# Patient Record
Sex: FEMALE | Race: BLACK OR AFRICAN AMERICAN | NOT HISPANIC OR LATINO | Employment: OTHER | URBAN - METROPOLITAN AREA
[De-identification: names, ages, dates, MRNs, and addresses within clinical notes are randomized per-mention and may not be internally consistent; named-entity substitution may affect disease eponyms.]

---

## 2017-02-02 ENCOUNTER — ALLSCRIPTS OFFICE VISIT (OUTPATIENT)
Dept: OTHER | Facility: OTHER | Age: 82
End: 2017-02-02

## 2017-02-02 ENCOUNTER — GENERIC CONVERSION - ENCOUNTER (OUTPATIENT)
Dept: OTHER | Facility: OTHER | Age: 82
End: 2017-02-02

## 2017-02-02 ENCOUNTER — TRANSCRIBE ORDERS (OUTPATIENT)
Dept: LAB | Facility: CLINIC | Age: 82
End: 2017-02-02

## 2017-02-02 ENCOUNTER — APPOINTMENT (OUTPATIENT)
Dept: LAB | Facility: CLINIC | Age: 82
End: 2017-02-02
Payer: COMMERCIAL

## 2017-02-02 DIAGNOSIS — R44.0 AUDITORY HALLUCINATIONS: ICD-10-CM

## 2017-02-02 DIAGNOSIS — R44.0 AUDITORY HALLUCINATION: Primary | ICD-10-CM

## 2017-02-02 DIAGNOSIS — Z63.4 BEREAVEMENT: ICD-10-CM

## 2017-02-02 DIAGNOSIS — R44.1 VISUAL HALLUCINATIONS: ICD-10-CM

## 2017-02-02 DIAGNOSIS — R44.1 VISUAL HALLUCINATION: ICD-10-CM

## 2017-02-02 DIAGNOSIS — Z63.4 DISAPPEARANCE AND DEATH OF FAMILY MEMBER: ICD-10-CM

## 2017-02-02 DIAGNOSIS — R44.0 AUDITORY HALLUCINATION: ICD-10-CM

## 2017-02-02 LAB
ALBUMIN SERPL BCP-MCNC: 3.8 G/DL (ref 3.5–5)
ALP SERPL-CCNC: 91 U/L (ref 46–116)
ALT SERPL W P-5'-P-CCNC: 19 U/L (ref 12–78)
ANION GAP SERPL CALCULATED.3IONS-SCNC: 7 MMOL/L (ref 4–13)
AST SERPL W P-5'-P-CCNC: 20 U/L (ref 5–45)
BASOPHILS # BLD AUTO: 0.02 THOUSANDS/ΜL (ref 0–0.1)
BASOPHILS NFR BLD AUTO: 0 % (ref 0–1)
BILIRUB SERPL-MCNC: 0.4 MG/DL (ref 0.2–1)
BILIRUB UR QL STRIP: NEGATIVE
BUN SERPL-MCNC: 13 MG/DL (ref 5–25)
CALCIUM SERPL-MCNC: 10.1 MG/DL (ref 8.3–10.1)
CHLORIDE SERPL-SCNC: 107 MMOL/L (ref 100–108)
CLARITY UR: CLEAR
CO2 SERPL-SCNC: 30 MMOL/L (ref 21–32)
COLOR UR: YELLOW
CREAT SERPL-MCNC: 0.84 MG/DL (ref 0.6–1.3)
EOSINOPHIL # BLD AUTO: 0 THOUSAND/ΜL (ref 0–0.61)
EOSINOPHIL NFR BLD AUTO: 0 % (ref 0–6)
ERYTHROCYTE [DISTWIDTH] IN BLOOD BY AUTOMATED COUNT: 13.9 % (ref 11.6–15.1)
FOLATE SERPL-MCNC: 10.7 NG/ML (ref 3.1–17.5)
GFR SERPL CREATININE-BSD FRML MDRD: >60 ML/MIN/1.73SQ M
GLUCOSE SERPL-MCNC: 88 MG/DL (ref 65–140)
GLUCOSE UR STRIP-MCNC: NEGATIVE MG/DL
HCT VFR BLD AUTO: 42.2 % (ref 34.8–46.1)
HGB BLD-MCNC: 13.5 G/DL (ref 11.5–15.4)
HGB UR QL STRIP.AUTO: NEGATIVE
KETONES UR STRIP-MCNC: NEGATIVE MG/DL
LEUKOCYTE ESTERASE UR QL STRIP: NEGATIVE
LYMPHOCYTES # BLD AUTO: 1.58 THOUSANDS/ΜL (ref 0.6–4.47)
LYMPHOCYTES NFR BLD AUTO: 34 % (ref 14–44)
MCH RBC QN AUTO: 27.7 PG (ref 26.8–34.3)
MCHC RBC AUTO-ENTMCNC: 32 G/DL (ref 31.4–37.4)
MCV RBC AUTO: 87 FL (ref 82–98)
MONOCYTES # BLD AUTO: 0.76 THOUSAND/ΜL (ref 0.17–1.22)
MONOCYTES NFR BLD AUTO: 16 % (ref 4–12)
NEUTROPHILS # BLD AUTO: 2.3 THOUSANDS/ΜL (ref 1.85–7.62)
NEUTS SEG NFR BLD AUTO: 50 % (ref 43–75)
NITRITE UR QL STRIP: NEGATIVE
PH UR STRIP.AUTO: 6 [PH] (ref 4.5–8)
PLATELET # BLD AUTO: 252 THOUSANDS/UL (ref 149–390)
PMV BLD AUTO: 11 FL (ref 8.9–12.7)
POTASSIUM SERPL-SCNC: 3.5 MMOL/L (ref 3.5–5.3)
PROT SERPL-MCNC: 7.7 G/DL (ref 6.4–8.2)
PROT UR STRIP-MCNC: NEGATIVE MG/DL
PTH-INTACT SERPL-MCNC: 85.6 PG/ML (ref 14–72)
RBC # BLD AUTO: 4.87 MILLION/UL (ref 3.81–5.12)
SODIUM SERPL-SCNC: 144 MMOL/L (ref 136–145)
SP GR UR STRIP.AUTO: >=1.03 (ref 1–1.03)
TSH SERPL DL<=0.05 MIU/L-ACNC: 1.72 UIU/ML (ref 0.36–3.74)
UROBILINOGEN UR QL STRIP.AUTO: 1 E.U./DL
VIT B12 SERPL-MCNC: 295 PG/ML (ref 100–900)
WBC # BLD AUTO: 4.66 THOUSAND/UL (ref 4.31–10.16)

## 2017-02-02 PROCEDURE — 83970 ASSAY OF PARATHORMONE: CPT

## 2017-02-02 PROCEDURE — 85025 COMPLETE CBC W/AUTO DIFF WBC: CPT

## 2017-02-02 PROCEDURE — 82607 VITAMIN B-12: CPT

## 2017-02-02 PROCEDURE — 81003 URINALYSIS AUTO W/O SCOPE: CPT

## 2017-02-02 PROCEDURE — 82746 ASSAY OF FOLIC ACID SERUM: CPT

## 2017-02-02 PROCEDURE — 84443 ASSAY THYROID STIM HORMONE: CPT

## 2017-02-02 PROCEDURE — 86592 SYPHILIS TEST NON-TREP QUAL: CPT

## 2017-02-02 PROCEDURE — 36415 COLL VENOUS BLD VENIPUNCTURE: CPT

## 2017-02-02 PROCEDURE — 80053 COMPREHEN METABOLIC PANEL: CPT

## 2017-02-02 SDOH — SOCIAL STABILITY - SOCIAL INSECURITY: DISSAPEARANCE AND DEATH OF FAMILY MEMBER: Z63.4

## 2017-02-03 ENCOUNTER — GENERIC CONVERSION - ENCOUNTER (OUTPATIENT)
Dept: OTHER | Facility: OTHER | Age: 82
End: 2017-02-03

## 2017-02-03 LAB — RPR SER QL: NORMAL

## 2017-02-07 ENCOUNTER — ALLSCRIPTS OFFICE VISIT (OUTPATIENT)
Dept: OTHER | Facility: OTHER | Age: 82
End: 2017-02-07

## 2017-02-15 ENCOUNTER — ALLSCRIPTS OFFICE VISIT (OUTPATIENT)
Dept: OTHER | Facility: OTHER | Age: 82
End: 2017-02-15

## 2017-02-16 ENCOUNTER — TRANSCRIBE ORDERS (OUTPATIENT)
Dept: ADMINISTRATIVE | Facility: HOSPITAL | Age: 82
End: 2017-02-16

## 2017-02-16 DIAGNOSIS — R44.1 VISUAL HALLUCINATION: Primary | ICD-10-CM

## 2017-02-22 ENCOUNTER — ALLSCRIPTS OFFICE VISIT (OUTPATIENT)
Dept: OTHER | Facility: OTHER | Age: 82
End: 2017-02-22

## 2017-03-02 ENCOUNTER — APPOINTMENT (EMERGENCY)
Dept: RADIOLOGY | Facility: HOSPITAL | Age: 82
DRG: 885 | End: 2017-03-02
Payer: COMMERCIAL

## 2017-03-02 ENCOUNTER — HOSPITAL ENCOUNTER (INPATIENT)
Facility: HOSPITAL | Age: 82
LOS: 8 days | Discharge: HOME | DRG: 885 | End: 2017-03-10
Attending: EMERGENCY MEDICINE | Admitting: PSYCHIATRY & NEUROLOGY
Payer: COMMERCIAL

## 2017-03-02 DIAGNOSIS — F22 ACUTE PARANOIA (HCC): Primary | ICD-10-CM

## 2017-03-02 DIAGNOSIS — R60.0 BILATERAL LOWER EXTREMITY EDEMA: ICD-10-CM

## 2017-03-02 DIAGNOSIS — F29 PSYCHOSIS (HCC): ICD-10-CM

## 2017-03-02 LAB
ALBUMIN SERPL BCP-MCNC: 3.2 G/DL (ref 3.5–5)
ALP SERPL-CCNC: 82 U/L (ref 46–116)
ALT SERPL W P-5'-P-CCNC: 15 U/L (ref 12–78)
ANION GAP SERPL CALCULATED.3IONS-SCNC: 6 MMOL/L (ref 4–13)
AST SERPL W P-5'-P-CCNC: 20 U/L (ref 5–45)
BASOPHILS # BLD AUTO: 0.02 THOUSANDS/ΜL (ref 0–0.1)
BASOPHILS NFR BLD AUTO: 1 % (ref 0–1)
BILIRUB SERPL-MCNC: 0.48 MG/DL (ref 0.2–1)
BUN SERPL-MCNC: 10 MG/DL (ref 5–25)
CALCIUM SERPL-MCNC: 10.2 MG/DL (ref 8.3–10.1)
CHLORIDE SERPL-SCNC: 109 MMOL/L (ref 100–108)
CO2 SERPL-SCNC: 28 MMOL/L (ref 21–32)
CREAT SERPL-MCNC: 0.81 MG/DL (ref 0.6–1.3)
EOSINOPHIL # BLD AUTO: 0 THOUSAND/ΜL (ref 0–0.61)
EOSINOPHIL NFR BLD AUTO: 0 % (ref 0–6)
ERYTHROCYTE [DISTWIDTH] IN BLOOD BY AUTOMATED COUNT: 13.7 % (ref 11.6–15.1)
GFR SERPL CREATININE-BSD FRML MDRD: >60 ML/MIN/1.73SQ M
GLUCOSE SERPL-MCNC: 81 MG/DL (ref 65–140)
HCT VFR BLD AUTO: 39.1 % (ref 34.8–46.1)
HGB BLD-MCNC: 12.7 G/DL (ref 11.5–15.4)
LYMPHOCYTES # BLD AUTO: 0.89 THOUSANDS/ΜL (ref 0.6–4.47)
LYMPHOCYTES NFR BLD AUTO: 22 % (ref 14–44)
MCH RBC QN AUTO: 27.9 PG (ref 26.8–34.3)
MCHC RBC AUTO-ENTMCNC: 32.5 G/DL (ref 31.4–37.4)
MCV RBC AUTO: 86 FL (ref 82–98)
MONOCYTES # BLD AUTO: 0.68 THOUSAND/ΜL (ref 0.17–1.22)
MONOCYTES NFR BLD AUTO: 17 % (ref 4–12)
NEUTROPHILS # BLD AUTO: 2.52 THOUSANDS/ΜL (ref 1.85–7.62)
NEUTS SEG NFR BLD AUTO: 60 % (ref 43–75)
NRBC BLD AUTO-RTO: 0 /100 WBCS
PLATELET # BLD AUTO: 214 THOUSANDS/UL (ref 149–390)
PMV BLD AUTO: 10.5 FL (ref 8.9–12.7)
POTASSIUM SERPL-SCNC: 3.8 MMOL/L (ref 3.5–5.3)
PROT SERPL-MCNC: 7.1 G/DL (ref 6.4–8.2)
RBC # BLD AUTO: 4.56 MILLION/UL (ref 3.81–5.12)
SODIUM SERPL-SCNC: 143 MMOL/L (ref 136–145)
TSH SERPL DL<=0.05 MIU/L-ACNC: 1.61 UIU/ML (ref 0.36–3.74)
WBC # BLD AUTO: 4.11 THOUSAND/UL (ref 4.31–10.16)

## 2017-03-02 PROCEDURE — 80053 COMPREHEN METABOLIC PANEL: CPT | Performed by: EMERGENCY MEDICINE

## 2017-03-02 PROCEDURE — 84443 ASSAY THYROID STIM HORMONE: CPT | Performed by: EMERGENCY MEDICINE

## 2017-03-02 PROCEDURE — 36415 COLL VENOUS BLD VENIPUNCTURE: CPT | Performed by: EMERGENCY MEDICINE

## 2017-03-02 PROCEDURE — 70450 CT HEAD/BRAIN W/O DYE: CPT

## 2017-03-02 PROCEDURE — 85025 COMPLETE CBC W/AUTO DIFF WBC: CPT | Performed by: EMERGENCY MEDICINE

## 2017-03-02 PROCEDURE — 99285 EMERGENCY DEPT VISIT HI MDM: CPT

## 2017-03-02 RX ORDER — RISPERIDONE 1 MG/1
1 TABLET, ORALLY DISINTEGRATING ORAL EVERY 8 HOURS PRN
Status: DISCONTINUED | OUTPATIENT
Start: 2017-03-02 | End: 2017-03-10 | Stop reason: HOSPADM

## 2017-03-02 RX ORDER — FUROSEMIDE 20 MG/1
20 TABLET ORAL DAILY
Status: DISCONTINUED | OUTPATIENT
Start: 2017-03-03 | End: 2017-03-10 | Stop reason: HOSPADM

## 2017-03-02 RX ORDER — LOSARTAN POTASSIUM 50 MG/1
100 TABLET ORAL DAILY
Status: DISCONTINUED | OUTPATIENT
Start: 2017-03-03 | End: 2017-03-07

## 2017-03-02 RX ORDER — OLANZAPINE 5 MG/1
2.5 TABLET ORAL EVERY 8 HOURS PRN
Status: DISCONTINUED | OUTPATIENT
Start: 2017-03-02 | End: 2017-03-10 | Stop reason: HOSPADM

## 2017-03-02 RX ORDER — LORAZEPAM 2 MG/ML
0.5 INJECTION INTRAMUSCULAR EVERY 6 HOURS PRN
Status: DISCONTINUED | OUTPATIENT
Start: 2017-03-02 | End: 2017-03-10 | Stop reason: HOSPADM

## 2017-03-02 RX ORDER — FUROSEMIDE 20 MG/1
20 TABLET ORAL DAILY
COMMUNITY
End: 2017-12-05 | Stop reason: HOSPADM

## 2017-03-02 RX ORDER — RISPERIDONE 1 MG/1
1 TABLET, FILM COATED ORAL 2 TIMES DAILY
COMMUNITY
End: 2017-03-10 | Stop reason: HOSPADM

## 2017-03-02 RX ORDER — LORAZEPAM 0.5 MG/1
0.5 TABLET ORAL EVERY 6 HOURS PRN
Status: DISCONTINUED | OUTPATIENT
Start: 2017-03-02 | End: 2017-03-10 | Stop reason: HOSPADM

## 2017-03-02 RX ORDER — ATORVASTATIN CALCIUM 20 MG/1
20 TABLET, FILM COATED ORAL DAILY
Status: DISCONTINUED | OUTPATIENT
Start: 2017-03-03 | End: 2017-03-10 | Stop reason: HOSPADM

## 2017-03-02 RX ORDER — RISPERIDONE 1 MG/1
1 TABLET, FILM COATED ORAL 2 TIMES DAILY
Status: DISCONTINUED | OUTPATIENT
Start: 2017-03-02 | End: 2017-03-04

## 2017-03-02 RX ORDER — LOSARTAN POTASSIUM 100 MG/1
100 TABLET ORAL DAILY
COMMUNITY
End: 2018-04-11 | Stop reason: HOSPADM

## 2017-03-02 RX ORDER — OLANZAPINE 10 MG/1
2.5 INJECTION, POWDER, LYOPHILIZED, FOR SOLUTION INTRAMUSCULAR EVERY 8 HOURS PRN
Status: DISCONTINUED | OUTPATIENT
Start: 2017-03-02 | End: 2017-03-10 | Stop reason: HOSPADM

## 2017-03-02 RX ORDER — ATORVASTATIN CALCIUM 20 MG/1
20 TABLET, FILM COATED ORAL DAILY
COMMUNITY
End: 2018-03-22 | Stop reason: SDUPTHER

## 2017-03-02 RX ORDER — AMLODIPINE BESYLATE 5 MG/1
7.5 TABLET ORAL DAILY
COMMUNITY
End: 2017-11-24 | Stop reason: CLARIF

## 2017-03-02 RX ORDER — TRAZODONE HYDROCHLORIDE 50 MG/1
25 TABLET ORAL
Status: DISCONTINUED | OUTPATIENT
Start: 2017-03-02 | End: 2017-03-10 | Stop reason: HOSPADM

## 2017-03-02 RX ADMIN — RISPERIDONE 1 MG: 1 TABLET ORAL at 21:35

## 2017-03-03 PROBLEM — F29 PSYCHOSIS (HCC): Status: ACTIVE | Noted: 2017-03-03

## 2017-03-03 LAB
ALBUMIN SERPL BCP-MCNC: 2.9 G/DL (ref 3.5–5)
ALP SERPL-CCNC: 75 U/L (ref 46–116)
ALT SERPL W P-5'-P-CCNC: 16 U/L (ref 12–78)
ANION GAP SERPL CALCULATED.3IONS-SCNC: 7 MMOL/L (ref 4–13)
AST SERPL W P-5'-P-CCNC: 17 U/L (ref 5–45)
BILIRUB SERPL-MCNC: 0.42 MG/DL (ref 0.2–1)
BILIRUB UR QL STRIP: NEGATIVE
BUN SERPL-MCNC: 12 MG/DL (ref 5–25)
CALCIUM SERPL-MCNC: 9.7 MG/DL (ref 8.3–10.1)
CHLORIDE SERPL-SCNC: 111 MMOL/L (ref 100–108)
CLARITY UR: CLEAR
CO2 SERPL-SCNC: 27 MMOL/L (ref 21–32)
COLOR UR: YELLOW
CREAT SERPL-MCNC: 0.8 MG/DL (ref 0.6–1.3)
GFR SERPL CREATININE-BSD FRML MDRD: >60 ML/MIN/1.73SQ M
GLUCOSE SERPL-MCNC: 80 MG/DL (ref 65–140)
GLUCOSE UR STRIP-MCNC: NEGATIVE MG/DL
HGB UR QL STRIP.AUTO: NEGATIVE
KETONES UR STRIP-MCNC: NEGATIVE MG/DL
LEUKOCYTE ESTERASE UR QL STRIP: NEGATIVE
NITRITE UR QL STRIP: NEGATIVE
PH UR STRIP.AUTO: 5.5 [PH] (ref 4.5–8)
POTASSIUM SERPL-SCNC: 3.4 MMOL/L (ref 3.5–5.3)
PROT SERPL-MCNC: 6.4 G/DL (ref 6.4–8.2)
PROT UR STRIP-MCNC: NEGATIVE MG/DL
RPR SER QL: NORMAL
SODIUM SERPL-SCNC: 145 MMOL/L (ref 136–145)
SP GR UR STRIP.AUTO: 1.01 (ref 1–1.03)
UROBILINOGEN UR QL STRIP.AUTO: 0.2 E.U./DL

## 2017-03-03 PROCEDURE — 86592 SYPHILIS TEST NON-TREP QUAL: CPT | Performed by: PHYSICIAN ASSISTANT

## 2017-03-03 PROCEDURE — 81003 URINALYSIS AUTO W/O SCOPE: CPT | Performed by: PHYSICIAN ASSISTANT

## 2017-03-03 PROCEDURE — 80053 COMPREHEN METABOLIC PANEL: CPT | Performed by: PHYSICIAN ASSISTANT

## 2017-03-03 RX ADMIN — LOSARTAN POTASSIUM 100 MG: 50 TABLET, FILM COATED ORAL at 09:10

## 2017-03-03 RX ADMIN — FUROSEMIDE 20 MG: 20 TABLET ORAL at 09:10

## 2017-03-03 RX ADMIN — RISPERIDONE 1 MG: 1 TABLET ORAL at 17:20

## 2017-03-03 RX ADMIN — ATORVASTATIN CALCIUM 20 MG: 20 TABLET, FILM COATED ORAL at 09:10

## 2017-03-03 RX ADMIN — RISPERIDONE 1 MG: 1 TABLET ORAL at 09:11

## 2017-03-03 RX ADMIN — AMLODIPINE BESYLATE 7.5 MG: 5 TABLET ORAL at 09:10

## 2017-03-04 LAB — TSH SERPL DL<=0.05 MIU/L-ACNC: 2.32 UIU/ML (ref 0.36–3.74)

## 2017-03-04 PROCEDURE — 84443 ASSAY THYROID STIM HORMONE: CPT | Performed by: PSYCHIATRY & NEUROLOGY

## 2017-03-04 RX ORDER — RISPERIDONE 0.25 MG/1
0.5 TABLET, FILM COATED ORAL 2 TIMES DAILY
Status: DISCONTINUED | OUTPATIENT
Start: 2017-03-04 | End: 2017-03-08

## 2017-03-04 RX ADMIN — ATORVASTATIN CALCIUM 20 MG: 20 TABLET, FILM COATED ORAL at 08:20

## 2017-03-04 RX ADMIN — LOSARTAN POTASSIUM 100 MG: 50 TABLET, FILM COATED ORAL at 08:19

## 2017-03-04 RX ADMIN — RISPERIDONE 0.5 MG: 0.25 TABLET ORAL at 21:16

## 2017-03-04 RX ADMIN — RISPERIDONE 1 MG: 1 TABLET ORAL at 08:19

## 2017-03-04 RX ADMIN — FUROSEMIDE 20 MG: 20 TABLET ORAL at 08:20

## 2017-03-04 RX ADMIN — AMLODIPINE BESYLATE 7.5 MG: 5 TABLET ORAL at 08:19

## 2017-03-05 RX ORDER — POTASSIUM CHLORIDE 20 MEQ/1
20 TABLET, EXTENDED RELEASE ORAL ONCE
Status: COMPLETED | OUTPATIENT
Start: 2017-03-06 | End: 2017-03-06

## 2017-03-05 RX ADMIN — RISPERIDONE 0.5 MG: 0.25 TABLET ORAL at 08:55

## 2017-03-05 RX ADMIN — AMLODIPINE BESYLATE 7.5 MG: 5 TABLET ORAL at 08:54

## 2017-03-05 RX ADMIN — ATORVASTATIN CALCIUM 20 MG: 20 TABLET, FILM COATED ORAL at 08:55

## 2017-03-05 RX ADMIN — FUROSEMIDE 20 MG: 20 TABLET ORAL at 08:55

## 2017-03-05 RX ADMIN — RISPERIDONE 0.5 MG: 0.25 TABLET ORAL at 21:08

## 2017-03-06 RX ADMIN — POTASSIUM CHLORIDE 20 MEQ: 1500 TABLET, EXTENDED RELEASE ORAL at 00:00

## 2017-03-06 RX ADMIN — ATORVASTATIN CALCIUM 20 MG: 20 TABLET, FILM COATED ORAL at 08:43

## 2017-03-06 RX ADMIN — RISPERIDONE 0.5 MG: 0.25 TABLET ORAL at 08:43

## 2017-03-06 RX ADMIN — FUROSEMIDE 20 MG: 20 TABLET ORAL at 08:43

## 2017-03-06 RX ADMIN — RISPERIDONE 0.5 MG: 0.25 TABLET ORAL at 21:22

## 2017-03-06 RX ADMIN — LOSARTAN POTASSIUM 100 MG: 50 TABLET, FILM COATED ORAL at 08:43

## 2017-03-07 RX ORDER — LOSARTAN POTASSIUM 50 MG/1
50 TABLET ORAL DAILY
Status: DISCONTINUED | OUTPATIENT
Start: 2017-03-08 | End: 2017-03-10 | Stop reason: HOSPADM

## 2017-03-07 RX ADMIN — FUROSEMIDE 20 MG: 20 TABLET ORAL at 08:31

## 2017-03-07 RX ADMIN — RISPERIDONE 0.5 MG: 0.25 TABLET ORAL at 21:33

## 2017-03-07 RX ADMIN — RISPERIDONE 0.5 MG: 0.25 TABLET ORAL at 08:30

## 2017-03-07 RX ADMIN — LOSARTAN POTASSIUM 100 MG: 50 TABLET, FILM COATED ORAL at 08:30

## 2017-03-07 RX ADMIN — ATORVASTATIN CALCIUM 20 MG: 20 TABLET, FILM COATED ORAL at 08:31

## 2017-03-08 RX ORDER — ACETAMINOPHEN 325 MG/1
650 TABLET ORAL EVERY 6 HOURS PRN
Status: DISCONTINUED | OUTPATIENT
Start: 2017-03-08 | End: 2017-03-10 | Stop reason: HOSPADM

## 2017-03-08 RX ORDER — RISPERIDONE 0.25 MG/1
0.5 TABLET, FILM COATED ORAL EVERY MORNING
Status: DISCONTINUED | OUTPATIENT
Start: 2017-03-09 | End: 2017-03-10 | Stop reason: HOSPADM

## 2017-03-08 RX ORDER — ACETAMINOPHEN 325 MG/1
650 TABLET ORAL EVERY 4 HOURS PRN
Status: DISCONTINUED | OUTPATIENT
Start: 2017-03-08 | End: 2017-03-10 | Stop reason: HOSPADM

## 2017-03-08 RX ORDER — ACETAMINOPHEN 325 MG/1
975 TABLET ORAL EVERY 6 HOURS PRN
Status: DISCONTINUED | OUTPATIENT
Start: 2017-03-08 | End: 2017-03-10 | Stop reason: HOSPADM

## 2017-03-08 RX ORDER — RISPERIDONE 1 MG/1
1 TABLET, FILM COATED ORAL
Status: DISCONTINUED | OUTPATIENT
Start: 2017-03-08 | End: 2017-03-10 | Stop reason: HOSPADM

## 2017-03-08 RX ADMIN — FUROSEMIDE 20 MG: 20 TABLET ORAL at 08:44

## 2017-03-08 RX ADMIN — RISPERIDONE 1 MG: 1 TABLET, FILM COATED ORAL at 21:22

## 2017-03-08 RX ADMIN — RISPERIDONE 0.5 MG: 0.25 TABLET ORAL at 08:44

## 2017-03-08 RX ADMIN — LOSARTAN POTASSIUM 50 MG: 50 TABLET, FILM COATED ORAL at 08:45

## 2017-03-08 RX ADMIN — ATORVASTATIN CALCIUM 20 MG: 20 TABLET, FILM COATED ORAL at 08:44

## 2017-03-08 RX ADMIN — ACETAMINOPHEN 975 MG: 325 TABLET, FILM COATED ORAL at 12:20

## 2017-03-09 PROCEDURE — 97166 OT EVAL MOD COMPLEX 45 MIN: CPT

## 2017-03-09 RX ORDER — RISPERIDONE 1 MG/1
TABLET, FILM COATED ORAL
Status: DISPENSED
Start: 2017-03-09 | End: 2017-03-10

## 2017-03-09 RX ADMIN — LOSARTAN POTASSIUM 50 MG: 50 TABLET, FILM COATED ORAL at 08:56

## 2017-03-09 RX ADMIN — RISPERIDONE 1 MG: 1 TABLET, FILM COATED ORAL at 21:50

## 2017-03-09 RX ADMIN — ATORVASTATIN CALCIUM 20 MG: 20 TABLET, FILM COATED ORAL at 08:56

## 2017-03-09 RX ADMIN — ACETAMINOPHEN 975 MG: 325 TABLET, FILM COATED ORAL at 03:31

## 2017-03-09 RX ADMIN — RISPERIDONE 0.5 MG: 0.25 TABLET ORAL at 08:58

## 2017-03-09 RX ADMIN — FUROSEMIDE 20 MG: 20 TABLET ORAL at 08:56

## 2017-03-10 VITALS
RESPIRATION RATE: 18 BRPM | DIASTOLIC BLOOD PRESSURE: 64 MMHG | WEIGHT: 152.8 LBS | OXYGEN SATURATION: 95 % | BODY MASS INDEX: 24.56 KG/M2 | SYSTOLIC BLOOD PRESSURE: 138 MMHG | HEIGHT: 66 IN | TEMPERATURE: 99.1 F | HEART RATE: 81 BPM

## 2017-03-10 RX ORDER — LOSARTAN POTASSIUM 50 MG/1
TABLET ORAL
Status: COMPLETED
Start: 2017-03-10 | End: 2017-03-10

## 2017-03-10 RX ORDER — RISPERIDONE 0.5 MG/1
0.5 TABLET, FILM COATED ORAL EVERY MORNING
Qty: 30 TABLET | Refills: 0 | Status: SHIPPED | OUTPATIENT
Start: 2017-03-10 | End: 2017-12-05 | Stop reason: HOSPADM

## 2017-03-10 RX ORDER — ATORVASTATIN CALCIUM 20 MG/1
TABLET, FILM COATED ORAL
Status: COMPLETED
Start: 2017-03-10 | End: 2017-03-10

## 2017-03-10 RX ORDER — RISPERIDONE 0.25 MG/1
TABLET, FILM COATED ORAL
Status: COMPLETED
Start: 2017-03-10 | End: 2017-03-10

## 2017-03-10 RX ORDER — RISPERIDONE 1 MG/1
1 TABLET, FILM COATED ORAL
Qty: 30 TABLET | Refills: 0 | Status: SHIPPED | OUTPATIENT
Start: 2017-03-10 | End: 2017-11-24 | Stop reason: CLARIF

## 2017-03-10 RX ORDER — FUROSEMIDE 20 MG/1
TABLET ORAL
Status: COMPLETED
Start: 2017-03-10 | End: 2017-03-10

## 2017-03-10 RX ADMIN — LOSARTAN POTASSIUM 50 MG: 50 TABLET, FILM COATED ORAL at 08:44

## 2017-03-10 RX ADMIN — ATORVASTATIN CALCIUM 20 MG: 20 TABLET, FILM COATED ORAL at 08:44

## 2017-03-10 RX ADMIN — RISPERIDONE 0.5 MG: 0.25 TABLET ORAL at 08:44

## 2017-03-10 RX ADMIN — FUROSEMIDE 20 MG: 20 TABLET ORAL at 08:44

## 2017-03-20 ENCOUNTER — ALLSCRIPTS OFFICE VISIT (OUTPATIENT)
Dept: OTHER | Facility: OTHER | Age: 82
End: 2017-03-20

## 2017-03-21 ENCOUNTER — ALLSCRIPTS OFFICE VISIT (OUTPATIENT)
Dept: OTHER | Facility: OTHER | Age: 82
End: 2017-03-21

## 2017-06-13 ENCOUNTER — ALLSCRIPTS OFFICE VISIT (OUTPATIENT)
Dept: OTHER | Facility: OTHER | Age: 82
End: 2017-06-13

## 2017-06-28 ENCOUNTER — ALLSCRIPTS OFFICE VISIT (OUTPATIENT)
Dept: OTHER | Facility: OTHER | Age: 82
End: 2017-06-28

## 2017-09-11 ENCOUNTER — HOSPITAL ENCOUNTER (OUTPATIENT)
Dept: NON INVASIVE DIAGNOSTICS | Facility: CLINIC | Age: 82
Discharge: HOME | End: 2017-09-11
Payer: COMMERCIAL

## 2017-09-11 ENCOUNTER — ALLSCRIPTS OFFICE VISIT (OUTPATIENT)
Dept: OTHER | Facility: OTHER | Age: 82
End: 2017-09-11

## 2017-09-11 ENCOUNTER — TRANSCRIBE ORDERS (OUTPATIENT)
Dept: LAB | Facility: CLINIC | Age: 82
End: 2017-09-11

## 2017-09-11 ENCOUNTER — APPOINTMENT (OUTPATIENT)
Dept: LAB | Facility: CLINIC | Age: 82
End: 2017-09-11
Payer: COMMERCIAL

## 2017-09-11 DIAGNOSIS — I10 ESSENTIAL (PRIMARY) HYPERTENSION: ICD-10-CM

## 2017-09-11 DIAGNOSIS — R60.9 EDEMA: ICD-10-CM

## 2017-09-11 DIAGNOSIS — E53.8 DEFICIENCY OF OTHER SPECIFIED B GROUP VITAMINS: ICD-10-CM

## 2017-09-11 LAB
ALBUMIN SERPL BCP-MCNC: 3.8 G/DL (ref 3.5–5)
ALP SERPL-CCNC: 65 U/L (ref 46–116)
ALT SERPL W P-5'-P-CCNC: 14 U/L (ref 12–78)
ANION GAP SERPL CALCULATED.3IONS-SCNC: 9 MMOL/L (ref 4–13)
AST SERPL W P-5'-P-CCNC: 15 U/L (ref 5–45)
BASOPHILS # BLD AUTO: 0.02 THOUSANDS/ΜL (ref 0–0.1)
BASOPHILS NFR BLD AUTO: 1 % (ref 0–1)
BILIRUB SERPL-MCNC: 0.6 MG/DL (ref 0.2–1)
BUN SERPL-MCNC: 11 MG/DL (ref 5–25)
CALCIUM ALBUM COR SERPL-MCNC: 11.3 MG/DL (ref 8.3–10.1)
CALCIUM SERPL-MCNC: 11.1 MG/DL (ref 8.3–10.1)
CHLORIDE SERPL-SCNC: 105 MMOL/L (ref 100–108)
CO2 SERPL-SCNC: 29 MMOL/L (ref 21–32)
CREAT SERPL-MCNC: 0.95 MG/DL (ref 0.6–1.3)
EOSINOPHIL # BLD AUTO: 0 THOUSAND/ΜL (ref 0–0.61)
EOSINOPHIL NFR BLD AUTO: 0 % (ref 0–6)
ERYTHROCYTE [DISTWIDTH] IN BLOOD BY AUTOMATED COUNT: 13.5 % (ref 11.6–15.1)
GFR SERPL CREATININE-BSD FRML MDRD: 65 ML/MIN/1.73SQ M
GLUCOSE P FAST SERPL-MCNC: 86 MG/DL (ref 65–99)
HCT VFR BLD AUTO: 37.6 % (ref 34.8–46.1)
HGB BLD-MCNC: 12.2 G/DL (ref 11.5–15.4)
LYMPHOCYTES # BLD AUTO: 0.86 THOUSANDS/ΜL (ref 0.6–4.47)
LYMPHOCYTES NFR BLD AUTO: 25 % (ref 14–44)
MCH RBC QN AUTO: 27.5 PG (ref 26.8–34.3)
MCHC RBC AUTO-ENTMCNC: 32.4 G/DL (ref 31.4–37.4)
MCV RBC AUTO: 85 FL (ref 82–98)
MONOCYTES # BLD AUTO: 0.65 THOUSAND/ΜL (ref 0.17–1.22)
MONOCYTES NFR BLD AUTO: 19 % (ref 4–12)
NEUTROPHILS # BLD AUTO: 1.9 THOUSANDS/ΜL (ref 1.85–7.62)
NEUTS SEG NFR BLD AUTO: 55 % (ref 43–75)
PLATELET # BLD AUTO: 204 THOUSANDS/UL (ref 149–390)
PMV BLD AUTO: 10.2 FL (ref 8.9–12.7)
POTASSIUM SERPL-SCNC: 3.3 MMOL/L (ref 3.5–5.3)
PROT SERPL-MCNC: 7.9 G/DL (ref 6.4–8.2)
RBC # BLD AUTO: 4.44 MILLION/UL (ref 3.81–5.12)
SODIUM SERPL-SCNC: 143 MMOL/L (ref 136–145)
VIT B12 SERPL-MCNC: 1001 PG/ML (ref 100–900)
WBC # BLD AUTO: 3.43 THOUSAND/UL (ref 4.31–10.16)

## 2017-09-11 PROCEDURE — 82607 VITAMIN B-12: CPT

## 2017-09-11 PROCEDURE — 80053 COMPREHEN METABOLIC PANEL: CPT

## 2017-09-11 PROCEDURE — 93970 EXTREMITY STUDY: CPT

## 2017-09-11 PROCEDURE — 36415 COLL VENOUS BLD VENIPUNCTURE: CPT

## 2017-09-11 PROCEDURE — 85025 COMPLETE CBC W/AUTO DIFF WBC: CPT

## 2017-10-02 DIAGNOSIS — E55.9 VITAMIN D DEFICIENCY: ICD-10-CM

## 2017-10-02 DIAGNOSIS — E53.8 DEFICIENCY OF OTHER SPECIFIED B GROUP VITAMINS: ICD-10-CM

## 2017-10-02 DIAGNOSIS — I10 ESSENTIAL (PRIMARY) HYPERTENSION: ICD-10-CM

## 2017-10-02 DIAGNOSIS — E83.42 HYPOMAGNESEMIA: ICD-10-CM

## 2017-10-02 DIAGNOSIS — E78.5 HYPERLIPIDEMIA: ICD-10-CM

## 2017-10-02 DIAGNOSIS — E83.52 HYPERCALCEMIA: ICD-10-CM

## 2017-10-16 ENCOUNTER — ALLSCRIPTS OFFICE VISIT (OUTPATIENT)
Dept: OTHER | Facility: OTHER | Age: 82
End: 2017-10-16

## 2017-10-16 ENCOUNTER — APPOINTMENT (OUTPATIENT)
Dept: LAB | Facility: CLINIC | Age: 82
End: 2017-10-16
Payer: COMMERCIAL

## 2017-10-16 ENCOUNTER — TRANSCRIBE ORDERS (OUTPATIENT)
Dept: LAB | Facility: CLINIC | Age: 82
End: 2017-10-16

## 2017-10-16 DIAGNOSIS — E53.8 DEFICIENCY OF OTHER SPECIFIED B GROUP VITAMINS: ICD-10-CM

## 2017-10-16 DIAGNOSIS — I10 ESSENTIAL (PRIMARY) HYPERTENSION: ICD-10-CM

## 2017-10-16 DIAGNOSIS — E78.5 HYPERLIPIDEMIA: ICD-10-CM

## 2017-10-16 DIAGNOSIS — E83.52 HYPERCALCEMIA: ICD-10-CM

## 2017-10-16 DIAGNOSIS — E55.9 VITAMIN D DEFICIENCY: ICD-10-CM

## 2017-10-16 LAB
25(OH)D3 SERPL-MCNC: 26.1 NG/ML (ref 30–100)
ALBUMIN SERPL BCP-MCNC: 3.8 G/DL (ref 3.5–5)
ALP SERPL-CCNC: 60 U/L (ref 46–116)
ALT SERPL W P-5'-P-CCNC: 17 U/L (ref 12–78)
ANION GAP SERPL CALCULATED.3IONS-SCNC: 6 MMOL/L (ref 4–13)
AST SERPL W P-5'-P-CCNC: 16 U/L (ref 5–45)
BASOPHILS # BLD AUTO: 0.01 THOUSANDS/ΜL (ref 0–0.1)
BASOPHILS NFR BLD AUTO: 0 % (ref 0–1)
BILIRUB SERPL-MCNC: 0.5 MG/DL (ref 0.2–1)
BUN SERPL-MCNC: 8 MG/DL (ref 5–25)
CALCIUM ALBUM COR SERPL-MCNC: 10.4 MG/DL (ref 8.3–10.1)
CALCIUM SERPL-MCNC: 10.2 MG/DL (ref 8.3–10.1)
CHLORIDE SERPL-SCNC: 106 MMOL/L (ref 100–108)
CHOLEST SERPL-MCNC: 164 MG/DL (ref 50–200)
CO2 SERPL-SCNC: 29 MMOL/L (ref 21–32)
CREAT SERPL-MCNC: 0.92 MG/DL (ref 0.6–1.3)
EOSINOPHIL # BLD AUTO: 0 THOUSAND/ΜL (ref 0–0.61)
EOSINOPHIL NFR BLD AUTO: 0 % (ref 0–6)
ERYTHROCYTE [DISTWIDTH] IN BLOOD BY AUTOMATED COUNT: 14.1 % (ref 11.6–15.1)
GFR SERPL CREATININE-BSD FRML MDRD: 67 ML/MIN/1.73SQ M
GLUCOSE P FAST SERPL-MCNC: 93 MG/DL (ref 65–99)
HCT VFR BLD AUTO: 34.3 % (ref 34.8–46.1)
HDLC SERPL-MCNC: 53 MG/DL (ref 40–60)
HGB BLD-MCNC: 11.1 G/DL (ref 11.5–15.4)
LDLC SERPL CALC-MCNC: 91 MG/DL (ref 0–100)
LYMPHOCYTES # BLD AUTO: 1.06 THOUSANDS/ΜL (ref 0.6–4.47)
LYMPHOCYTES NFR BLD AUTO: 21 % (ref 14–44)
MCH RBC QN AUTO: 27.7 PG (ref 26.8–34.3)
MCHC RBC AUTO-ENTMCNC: 32.4 G/DL (ref 31.4–37.4)
MCV RBC AUTO: 86 FL (ref 82–98)
MONOCYTES # BLD AUTO: 0.8 THOUSAND/ΜL (ref 0.17–1.22)
MONOCYTES NFR BLD AUTO: 16 % (ref 4–12)
NEUTROPHILS # BLD AUTO: 3.13 THOUSANDS/ΜL (ref 1.85–7.62)
NEUTS SEG NFR BLD AUTO: 63 % (ref 43–75)
PLATELET # BLD AUTO: 184 THOUSANDS/UL (ref 149–390)
PMV BLD AUTO: 10.7 FL (ref 8.9–12.7)
POTASSIUM SERPL-SCNC: 3.4 MMOL/L (ref 3.5–5.3)
PROT SERPL-MCNC: 7.2 G/DL (ref 6.4–8.2)
PTH-INTACT SERPL-MCNC: 76.7 PG/ML (ref 14–72)
RBC # BLD AUTO: 4.01 MILLION/UL (ref 3.81–5.12)
SODIUM SERPL-SCNC: 141 MMOL/L (ref 136–145)
TRIGL SERPL-MCNC: 100 MG/DL
TSH SERPL DL<=0.05 MIU/L-ACNC: 3.19 UIU/ML (ref 0.36–3.74)
VIT B12 SERPL-MCNC: 627 PG/ML (ref 100–900)
WBC # BLD AUTO: 5 THOUSAND/UL (ref 4.31–10.16)

## 2017-10-16 PROCEDURE — 36415 COLL VENOUS BLD VENIPUNCTURE: CPT

## 2017-10-16 PROCEDURE — 84443 ASSAY THYROID STIM HORMONE: CPT

## 2017-10-16 PROCEDURE — 85025 COMPLETE CBC W/AUTO DIFF WBC: CPT

## 2017-10-16 PROCEDURE — 83970 ASSAY OF PARATHORMONE: CPT

## 2017-10-16 PROCEDURE — 82607 VITAMIN B-12: CPT

## 2017-10-16 PROCEDURE — 80061 LIPID PANEL: CPT

## 2017-10-16 PROCEDURE — 82306 VITAMIN D 25 HYDROXY: CPT

## 2017-10-16 PROCEDURE — 80053 COMPREHEN METABOLIC PANEL: CPT

## 2017-10-17 NOTE — PROGRESS NOTES
Assessment  1  Hypertension (401 9) (I10)   2  Hyperlipidemia (272 4) (E78 5)   3  Edema (782 3) (R60 9)   4  Osteoporosis (733 00) (M81 0)   5  Hypercalcemia (275 42) (E83 52)   6  Constipation (564 00) (K59 00)    Plan  Hypercalcemia    · (1) PTH N-TERMINAL (INTACT); Status:Active; Requested for:16Oct2017;   Need for prophylactic vaccination and inoculation against influenza    · Fluzone High-Dose 0 5 ML Intramuscular Suspension Prefilled Syringe    Discussion/Summary  Discussion Summary:   Dependent edema, bilateral  Improved, taking furosemide every other day  No DVT on ultrasound  Hypercalcemia  Off calcium supplements, repeat Ca and PTH HTN  BP controlled on amlodipine and losartan  Hyperlipidemia  Continue statinHx hallucinations, auditory and visual  Compliant with risperidone bid  Vitamin B12 deficiency  Maintain on daily supplement  Osteoporosis  Continue alendronate  Bone density due 2018  Impacted cerumen, right  Instructed to use peroxide drops weekly  Constipation  Instructed to take laxative if no BM by second day  HM  Prevnar next visit  Flu vaccine today  up in 4 months or prn  Chief Complaint  Chief Complaint Chronic Condition [facility]: Patient is here today for follow up of chronic conditions described in HPI  History of Present Illness  HPI: Ms Bharathi Barrios is feeling well  is taking furosemide every other day and her leg swelling has improved  She uses her stockings daily  She takes her medications daily, BP is controlled  Denies any headache, chest pain or dyspnea  complains of drooling sometimes, no rash on chin takes potassium supplement daily  is constipated sometimes, eats fruit daily  Hypertension (Follow-Up): Symptoms: denies dyspnea,-- denies chest pain-- and-- improved lower extremity edema  Home monitoring: The patient is not checking blood pressure at home  Medications: the patient is adherent with her medication regimen     Disease Management: the patient is doing well with her blood pressure goals  Osteoporosis (Follow-Up): The patient's osteoporosis has been stable since the last visit  Symptoms: The patient is currently asymptomatic  Medications: the patient is adherent with her medication regimen  Constipation (Follow-Up): The patient is being seen for follow-up of constipation  Interval symptoms:  stable infrequent stools,-- denies hard stools-- and-- denies abdominal pain  Medications:  the patient is not adherent to her medication regimen  Review of Systems  Complete-Female:   Constitutional: not feeling poorly-- and-- not feeling tired  ENT: no earache-- and-- no nasal discharge  Cardiovascular: lower extremity edema, but-- as noted in HPI,-- no chest pain-- and-- no palpitations  Respiratory: no cough-- and-- no shortness of breath during exertion  Gastrointestinal: constipation, but-- as noted in HPI-- and-- no abdominal pain  Genitourinary: no dysuria  Musculoskeletal: no arthralgias  Integumentary: no rashes  Neurological: no headache-- and-- no dizziness  no feelings of weakness   Hematologic/Lymphatic: no tendency for easy bruising  Active Problems  1  Abnormal blood chemistry (790 6) (R79 9)   2  Anemia (285 9) (D64 9)   3  Auditory hallucinations (780 1) (R44 0)   4  Bereavement (V62 82) (Z63 4)   5  Constipation (564 00) (K59 00)   6  Edema (782 3) (R60 9)   7  Encounter for breast cancer screening other than mammogram (V76 10) (Z12 39)   8  Encounter for screening mammogram for malignant neoplasm of breast (V76 12) (Z12 31)   9  Glaucoma (365 9) (H40 9)   10  Hallucinations, visual (368 16) (R44 1)   11  Hypercalcemia (275 42) (E83 52)   12  Hyperlipidemia (272 4) (E78 5)   13  Hypertension (401 9) (I10)   14  Hypokalemia (276 8) (E87 6)   15  Hypomagnesemia (275 2) (E83 42)   16  Impacted cerumen of right ear (380 4) (H61 21)   17  Need for prophylactic vaccination and inoculation against influenza (V04 81) (Z23)   18   Need for vaccination for pneumococcus (V03 82) (Z23)   19  Osteoporosis (733 00) (M81 0)   20  Psychosis (298 9) (F29)   21  Screening for osteoporosis (V82 81) (Z13 820)   22  Special screening for malignant neoplasm of colon (V76 51) (Z12 11)   23  Vitamin B12 deficiency (266 2) (E53 8)   24  Vitamin D deficiency (268 9) (E55 9)    Past Medical History  1  History of Acute hepatitis c (070 51) (B17 10)   2  History of Allergic Reaction (995 3)   3  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)   4  History of Cellulitis of left leg (682 6) (L03 116)   5  History of Gallbladder disease (575 9) (K82 9)   6  History of low back pain (V13 59) (Z87 39)   7  History of Mass of right side of neck (784 2) (R22 1)   8  History of Neck strain (847 0) (S16 1XXA)   9  History of Otitis externa, unspecified laterality  Active Problems And Past Medical History Reviewed: The active problems and past medical history were reviewed and updated today  Surgical History  1  History of Aneurysm Repair Intracran Complex Verteb-Basilar Circulation   2  History of Appendectomy   3  History of Cholecystectomy   4  History of Total Abdominal Hysterectomy    Family History  Mother    1  Denied: Family history of Alcoholism and drug addiction in family   2  Denied: Family history of Anxiety and depression   3  Family history of Coronary Artery Disease (V17 49)   4  Family history of Mother  At Age 80  Father    11  Denied: Family history of Alcoholism and drug addiction in family   6  Denied: Family history of Anxiety and depression  Child    7  Denied: Family history of Alcoholism and drug addiction in family   6  Denied: Family history of Anxiety and depression  Daughter    5  Family history of Family Health Status Children 1  Living Daughters  Sibling    10  Denied: Family history of Anxiety and depression  Brother    6  Family history of Alcoholism   12  Family history of Heart Disease (V17 49)   13   Family history of Prostate Cancer (F30 98)    Social History   · Bereavement (P74 80) (Z63 4)   · Former smoker (B87 88) (E39 727)   · Marital History -  (V61 03)   · Never Drank Alcohol   · Occupation: Retired  Social History Reviewed: The social history was reviewed and is unchanged  Current Meds   1  Alendronate Sodium 70 MG Oral Tablet; take 1 tablet by mouth every week on an empty stomach   with 6-8 oz of water  No food / meds for 30 min  Remain Upright; Therapy: 39ASO6039 to ([phone])  Requested for: 14Elk8018; Last Rx:00Kev9389   Ordered   2  Aleve 220 MG Oral Capsule; Take 1 capsule twice daily; Therapy: 88Ska8891 to (Evaluate:32Aca2987); Last Rx:60Icy8425 Ordered   3  AmLODIPine Besylate 5 MG Oral Tablet; TAKE 1 AND 1/2 TABLETS DAILY; Therapy: 22GBS1609 to (Evaluate:51Hnq2115)  Requested for: 97EBD3106; Last Rx:24Ayw2401   Ordered   4  Atorvastatin Calcium 20 MG Oral Tablet; Take 1 tablet by mouth daily; Therapy: 03Sry8080 to (Evaluate:97Cjk9826)  Requested for: 12Clx9664; Last Rx:35Pvb2837   Ordered   5  Co Q 10 100 MG Oral Capsule; TAKE 1 CAPSULE TWICE DAILY; Therapy: 44QIJ6918 to Recorded   6  [employer] Vitamin B-12 1000 MCG Oral Tablet; take 1 tablet by mouth every day; Therapy: 23GJE5255 to (Evaluate:47Hji7468); Last Rx:28Jun2017 Ordered   7  Furosemide 20 MG Oral Tablet; TAKE 1 TABLET DAILY; Therapy: 87CMQ2772 to (Evaluate:38Lln0443)  Requested for: 65CVD3368; Last Rx:24Hni6511   Ordered   8  Losartan Potassium 100 MG Oral Tablet; TAKE 1 TABLET DAILY; Therapy: 84JJX8860 to (Evaluate:77Spv5002)  Requested for: 23INW8740; Last Rx:28Jun2017   Ordered   9  Magnesium Oxide 400 MG Oral Capsule; take 1 capsule daily; Therapy: 47ZPF7227 to (Evaluate:08Mar2017); Last Rx:68Zbc1576 Ordered   10  [place]-3 Fish Oil 1000 MG Oral Capsule; TAKE 1 CAPSULE DAILY; Therapy: 00CDT5145 to () Recorded   11   Potassium Chloride Dahlia ER 20 MEQ Oral Tablet Extended Release; TAKE 1 TABLET DAILY WITH    FUROSEMIDE; Therapy: 29SCC7607 to (Evaluate:86Yjf8855)  Requested for: 13VWN1792; Last Rx:28Jun2017    Ordered   12  RisperiDONE 1 MG Oral Tablet; TAKE 1 TABLET EVERY MORNING AND 1 TABLET EVERY EVENING; Therapy: 07AOQ4769 to (Evaluate:40Gik5506)  Requested for: 68SQE6344; Last Rx:28Jun2017    Ordered   13  Vitamin B-12 1000 MCG Oral Tablet; TAKE 1 TABLET BY MOUTH EVERY DAY; Therapy: 86Cxu4689 to (Last Rx:04Ljw2315) Ordered   14  Vitamin D 2000 UNIT Oral Capsule; take 1 capsule daily; Therapy: 95RRY9267 to (Last Rx:64Hjm9483) Ordered  Medication List Reviewed: The medication list was reviewed and updated today  Allergies  1  Amoxicillin TABS   2  Dilantin CAPS   3  Erythromycin Base TABS    Vitals  Vital Signs    Recorded: 00SJO0665 09:20AM   Temperature 97 8 F   Heart Rate 92   Respiration 18   Systolic 999   Diastolic 68   Height 5 ft 5 5 in   Weight 139 lb 4 oz   BMI Calculated 22 82   BSA Calculated 1 71   O2 Saturation 97     Physical Exam    Constitutional   General appearance: No acute distress, well appearing and well nourished  comfortable,-- clothing appropriate-- and-- well hydrated  Head and Face   Head and face: Normal     Eyes   Pupils and irises: Equal, round, reactive to light  Ears, Nose, Mouth, and Throat   External inspection of ears and nose: Normal     Otoscopic examination: Abnormal   The right tympanic membrane was obscured  The left tympanic membrane was normal  The right external canal had a cerumen impaction  The left external canal was normal    Neck   Neck: Supple, symmetric, trachea midline, no masses  Pulmonary   Respiratory effort: No increased work of breathing or signs of respiratory distress  Auscultation of lungs: Clear to auscultation  Cardiovascular   Auscultation of heart: Normal rate and rhythm, normal S1 and S2, no murmurs      Examination of extremities for edema and/or varicosities: Abnormal   bilateral pretibial pitting edema, but-- pitting edema present  Abdomen   Abdomen: Non-tender, no masses  Musculoskeletal   Gait and station: Normal     Psychiatric   Mood and affect: Normal        Health Management  Encounter for breast cancer screening other than mammogram   Digital Bilateral Screening Mammogram With CAD; every 1 year; Last 70OZO7888; Next Due:  73ALG8966; Overdue  Health Maintenance   Medicare Annual Wellness Visit; every 1 year; Next Due: 71AGA2039;  Overdue    Signatures   Electronically signed by : Darcel Brunner, MD; Oct 16 2017  9:48AM EST                       (Author)

## 2017-10-18 ENCOUNTER — GENERIC CONVERSION - ENCOUNTER (OUTPATIENT)
Dept: OTHER | Facility: OTHER | Age: 82
End: 2017-10-18

## 2017-11-10 ENCOUNTER — APPOINTMENT (OUTPATIENT)
Dept: LAB | Facility: CLINIC | Age: 82
End: 2017-11-10
Payer: COMMERCIAL

## 2017-11-10 ENCOUNTER — GENERIC CONVERSION - ENCOUNTER (OUTPATIENT)
Dept: OTHER | Facility: OTHER | Age: 82
End: 2017-11-10

## 2017-11-10 DIAGNOSIS — E83.52 HYPERCALCEMIA: ICD-10-CM

## 2017-11-10 DIAGNOSIS — E87.6 HYPOKALEMIA: ICD-10-CM

## 2017-11-10 LAB
ALBUMIN SERPL BCP-MCNC: 3.9 G/DL (ref 3.5–5)
ALP SERPL-CCNC: 63 U/L (ref 46–116)
ALT SERPL W P-5'-P-CCNC: 16 U/L (ref 12–78)
ANION GAP SERPL CALCULATED.3IONS-SCNC: 9 MMOL/L (ref 4–13)
AST SERPL W P-5'-P-CCNC: 14 U/L (ref 5–45)
BILIRUB SERPL-MCNC: 0.4 MG/DL (ref 0.2–1)
BUN SERPL-MCNC: 13 MG/DL (ref 5–25)
CALCIUM SERPL-MCNC: 10.1 MG/DL (ref 8.3–10.1)
CHLORIDE SERPL-SCNC: 109 MMOL/L (ref 100–108)
CO2 SERPL-SCNC: 28 MMOL/L (ref 21–32)
CREAT SERPL-MCNC: 1.16 MG/DL (ref 0.6–1.3)
GFR SERPL CREATININE-BSD FRML MDRD: 51 ML/MIN/1.73SQ M
GLUCOSE P FAST SERPL-MCNC: 100 MG/DL (ref 65–99)
PHOSPHATE SERPL-MCNC: 2.2 MG/DL (ref 2.3–4.1)
POTASSIUM SERPL-SCNC: 3.3 MMOL/L (ref 3.5–5.3)
PROT SERPL-MCNC: 7.6 G/DL (ref 6.4–8.2)
PTH-INTACT SERPL-MCNC: 128.5 PG/ML (ref 14–72)
SODIUM SERPL-SCNC: 146 MMOL/L (ref 136–145)

## 2017-11-10 PROCEDURE — 83970 ASSAY OF PARATHORMONE: CPT

## 2017-11-10 PROCEDURE — 36415 COLL VENOUS BLD VENIPUNCTURE: CPT

## 2017-11-10 PROCEDURE — 84100 ASSAY OF PHOSPHORUS: CPT

## 2017-11-10 PROCEDURE — 80053 COMPREHEN METABOLIC PANEL: CPT

## 2017-11-13 ENCOUNTER — GENERIC CONVERSION - ENCOUNTER (OUTPATIENT)
Dept: OTHER | Facility: OTHER | Age: 82
End: 2017-11-13

## 2017-11-24 ENCOUNTER — APPOINTMENT (EMERGENCY)
Dept: CT IMAGING | Facility: HOSPITAL | Age: 82
DRG: 643 | End: 2017-11-24
Payer: COMMERCIAL

## 2017-11-24 ENCOUNTER — APPOINTMENT (EMERGENCY)
Dept: RADIOLOGY | Facility: HOSPITAL | Age: 82
DRG: 643 | End: 2017-11-24
Payer: COMMERCIAL

## 2017-11-24 ENCOUNTER — HOSPITAL ENCOUNTER (INPATIENT)
Facility: HOSPITAL | Age: 82
LOS: 11 days | Discharge: NURSING, REHAB OR LONG-TERM CARE | DRG: 643 | End: 2017-12-05
Attending: EMERGENCY MEDICINE | Admitting: INTERNAL MEDICINE
Payer: COMMERCIAL

## 2017-11-24 DIAGNOSIS — F29 PSYCHOSIS (HCC): ICD-10-CM

## 2017-11-24 DIAGNOSIS — E83.52 HYPERCALCEMIA: ICD-10-CM

## 2017-11-24 DIAGNOSIS — L60.8 TOENAIL DEFORMITY: ICD-10-CM

## 2017-11-24 DIAGNOSIS — R41.0 DELIRIUM: Primary | ICD-10-CM

## 2017-11-24 DIAGNOSIS — G30.9 ALZHEIMER'S DISEASE (HCC): ICD-10-CM

## 2017-11-24 DIAGNOSIS — F02.80 ALZHEIMER'S DISEASE (HCC): ICD-10-CM

## 2017-11-24 PROBLEM — R60.9 PERIPHERAL EDEMA: Status: ACTIVE | Noted: 2017-11-24

## 2017-11-24 PROBLEM — E78.5 HLD (HYPERLIPIDEMIA): Status: ACTIVE | Noted: 2017-11-24

## 2017-11-24 PROBLEM — I10 HTN (HYPERTENSION), BENIGN: Status: ACTIVE | Noted: 2017-11-24

## 2017-11-24 PROBLEM — G93.40 ACUTE ENCEPHALOPATHY: Status: ACTIVE | Noted: 2017-11-24

## 2017-11-24 PROBLEM — D72.829 LEUKOCYTOSIS: Status: ACTIVE | Noted: 2017-11-24

## 2017-11-24 LAB
ALBUMIN SERPL BCP-MCNC: 2.9 G/DL (ref 3.5–5)
ALP SERPL-CCNC: 60 U/L (ref 46–116)
ALT SERPL W P-5'-P-CCNC: 20 U/L (ref 12–78)
AMMONIA PLAS-SCNC: <10 UMOL/L (ref 11–35)
ANION GAP SERPL CALCULATED.3IONS-SCNC: 10 MMOL/L (ref 4–13)
APTT PPP: 36 SECONDS (ref 23–35)
AST SERPL W P-5'-P-CCNC: 46 U/L (ref 5–45)
BACTERIA UR QL AUTO: ABNORMAL /HPF
BASOPHILS # BLD AUTO: 0.02 THOUSANDS/ΜL (ref 0–0.1)
BASOPHILS NFR BLD AUTO: 0 % (ref 0–1)
BILIRUB DIRECT SERPL-MCNC: 0.07 MG/DL (ref 0–0.2)
BILIRUB SERPL-MCNC: 1 MG/DL (ref 0.2–1)
BILIRUB UR QL STRIP: ABNORMAL
BUN SERPL-MCNC: 14 MG/DL (ref 5–25)
CALCIUM SERPL-MCNC: 11 MG/DL (ref 8.3–10.1)
CHLORIDE SERPL-SCNC: 105 MMOL/L (ref 100–108)
CLARITY UR: ABNORMAL
CO2 SERPL-SCNC: 26 MMOL/L (ref 21–32)
COLOR UR: YELLOW
CREAT SERPL-MCNC: 0.81 MG/DL (ref 0.6–1.3)
EOSINOPHIL # BLD AUTO: 0 THOUSAND/ΜL (ref 0–0.61)
EOSINOPHIL NFR BLD AUTO: 0 % (ref 0–6)
ERYTHROCYTE [DISTWIDTH] IN BLOOD BY AUTOMATED COUNT: 13.9 % (ref 11.6–15.1)
GFR SERPL CREATININE-BSD FRML MDRD: 78 ML/MIN/1.73SQ M
GLUCOSE SERPL-MCNC: 109 MG/DL (ref 65–140)
GLUCOSE UR STRIP-MCNC: NEGATIVE MG/DL
HCT VFR BLD AUTO: 35.5 % (ref 34.8–46.1)
HGB BLD-MCNC: 11.5 G/DL (ref 11.5–15.4)
HGB UR QL STRIP.AUTO: ABNORMAL
HYALINE CASTS #/AREA URNS LPF: ABNORMAL /LPF
INR PPP: 0.98 (ref 0.86–1.16)
KETONES UR STRIP-MCNC: NEGATIVE MG/DL
LEUKOCYTE ESTERASE UR QL STRIP: NEGATIVE
LYMPHOCYTES # BLD AUTO: 1.07 THOUSANDS/ΜL (ref 0.6–4.47)
LYMPHOCYTES NFR BLD AUTO: 9 % (ref 14–44)
MCH RBC QN AUTO: 27.5 PG (ref 26.8–34.3)
MCHC RBC AUTO-ENTMCNC: 32.4 G/DL (ref 31.4–37.4)
MCV RBC AUTO: 85 FL (ref 82–98)
MONOCYTES # BLD AUTO: 1.45 THOUSAND/ΜL (ref 0.17–1.22)
MONOCYTES NFR BLD AUTO: 12 % (ref 4–12)
MUCOUS THREADS UR QL AUTO: ABNORMAL
NEUTROPHILS # BLD AUTO: 9.21 THOUSANDS/ΜL (ref 1.85–7.62)
NEUTS SEG NFR BLD AUTO: 79 % (ref 43–75)
NITRITE UR QL STRIP: NEGATIVE
NON-SQ EPI CELLS URNS QL MICRO: ABNORMAL /HPF
NT-PROBNP SERPL-MCNC: 232 PG/ML
PH UR STRIP.AUTO: 6 [PH] (ref 4.5–8)
PLATELET # BLD AUTO: 316 THOUSANDS/UL (ref 149–390)
PMV BLD AUTO: 10.8 FL (ref 8.9–12.7)
POTASSIUM SERPL-SCNC: 5.2 MMOL/L (ref 3.5–5.3)
PROT SERPL-MCNC: 7.8 G/DL (ref 6.4–8.2)
PROT UR STRIP-MCNC: ABNORMAL MG/DL
PROTHROMBIN TIME: 13.3 SECONDS (ref 12.1–14.4)
RBC # BLD AUTO: 4.18 MILLION/UL (ref 3.81–5.12)
RBC #/AREA URNS AUTO: ABNORMAL /HPF
SODIUM SERPL-SCNC: 141 MMOL/L (ref 136–145)
SP GR UR STRIP.AUTO: 1.02 (ref 1–1.03)
TROPONIN I SERPL-MCNC: 0.02 NG/ML
TSH SERPL DL<=0.05 MIU/L-ACNC: 1.79 UIU/ML (ref 0.36–3.74)
UROBILINOGEN UR QL STRIP.AUTO: 4 E.U./DL
WBC # BLD AUTO: 11.75 THOUSAND/UL (ref 4.31–10.16)
WBC #/AREA URNS AUTO: ABNORMAL /HPF

## 2017-11-24 PROCEDURE — 84484 ASSAY OF TROPONIN QUANT: CPT | Performed by: EMERGENCY MEDICINE

## 2017-11-24 PROCEDURE — 84443 ASSAY THYROID STIM HORMONE: CPT | Performed by: EMERGENCY MEDICINE

## 2017-11-24 PROCEDURE — 82140 ASSAY OF AMMONIA: CPT | Performed by: EMERGENCY MEDICINE

## 2017-11-24 PROCEDURE — 99285 EMERGENCY DEPT VISIT HI MDM: CPT

## 2017-11-24 PROCEDURE — 36415 COLL VENOUS BLD VENIPUNCTURE: CPT | Performed by: EMERGENCY MEDICINE

## 2017-11-24 PROCEDURE — 80048 BASIC METABOLIC PNL TOTAL CA: CPT | Performed by: EMERGENCY MEDICINE

## 2017-11-24 PROCEDURE — 80076 HEPATIC FUNCTION PANEL: CPT | Performed by: EMERGENCY MEDICINE

## 2017-11-24 PROCEDURE — 85025 COMPLETE CBC W/AUTO DIFF WBC: CPT | Performed by: EMERGENCY MEDICINE

## 2017-11-24 PROCEDURE — 71020 HB CHEST X-RAY 2VW FRONTAL&LATL: CPT

## 2017-11-24 PROCEDURE — 70450 CT HEAD/BRAIN W/O DYE: CPT

## 2017-11-24 PROCEDURE — 93005 ELECTROCARDIOGRAM TRACING: CPT | Performed by: INTERNAL MEDICINE

## 2017-11-24 PROCEDURE — 93005 ELECTROCARDIOGRAM TRACING: CPT | Performed by: EMERGENCY MEDICINE

## 2017-11-24 PROCEDURE — 85610 PROTHROMBIN TIME: CPT | Performed by: EMERGENCY MEDICINE

## 2017-11-24 PROCEDURE — 87040 BLOOD CULTURE FOR BACTERIA: CPT | Performed by: EMERGENCY MEDICINE

## 2017-11-24 PROCEDURE — 85730 THROMBOPLASTIN TIME PARTIAL: CPT | Performed by: EMERGENCY MEDICINE

## 2017-11-24 PROCEDURE — 81001 URINALYSIS AUTO W/SCOPE: CPT | Performed by: EMERGENCY MEDICINE

## 2017-11-24 PROCEDURE — 83880 ASSAY OF NATRIURETIC PEPTIDE: CPT | Performed by: EMERGENCY MEDICINE

## 2017-11-24 RX ORDER — ONDANSETRON 2 MG/ML
4 INJECTION INTRAMUSCULAR; INTRAVENOUS EVERY 6 HOURS PRN
Status: DISCONTINUED | OUTPATIENT
Start: 2017-11-24 | End: 2017-12-05 | Stop reason: HOSPADM

## 2017-11-24 RX ORDER — LABETALOL HYDROCHLORIDE 5 MG/ML
10 INJECTION, SOLUTION INTRAVENOUS EVERY 4 HOURS PRN
Status: DISCONTINUED | OUTPATIENT
Start: 2017-11-24 | End: 2017-12-05 | Stop reason: HOSPADM

## 2017-11-24 RX ORDER — RISPERIDONE 1 MG/1
1 TABLET, FILM COATED ORAL 2 TIMES DAILY
Status: DISCONTINUED | OUTPATIENT
Start: 2017-11-24 | End: 2017-11-26

## 2017-11-24 RX ORDER — ATORVASTATIN CALCIUM 20 MG/1
20 TABLET, FILM COATED ORAL EVERY EVENING
Status: DISCONTINUED | OUTPATIENT
Start: 2017-11-24 | End: 2017-12-05 | Stop reason: HOSPADM

## 2017-11-24 RX ORDER — DOCUSATE SODIUM 100 MG/1
100 CAPSULE, LIQUID FILLED ORAL 2 TIMES DAILY
Status: DISCONTINUED | OUTPATIENT
Start: 2017-11-24 | End: 2017-12-05 | Stop reason: HOSPADM

## 2017-11-24 RX ORDER — POTASSIUM CHLORIDE 750 MG/1
20 TABLET, EXTENDED RELEASE ORAL DAILY
COMMUNITY
End: 2017-12-05 | Stop reason: HOSPADM

## 2017-11-24 RX ORDER — ACETAMINOPHEN 325 MG/1
650 TABLET ORAL EVERY 6 HOURS PRN
Status: DISCONTINUED | OUTPATIENT
Start: 2017-11-24 | End: 2017-12-05 | Stop reason: HOSPADM

## 2017-11-24 RX ORDER — LOSARTAN POTASSIUM 50 MG/1
100 TABLET ORAL DAILY
Status: DISCONTINUED | OUTPATIENT
Start: 2017-11-24 | End: 2017-12-05 | Stop reason: HOSPADM

## 2017-11-24 RX ORDER — SODIUM CHLORIDE 9 MG/ML
75 INJECTION, SOLUTION INTRAVENOUS CONTINUOUS
Status: DISCONTINUED | OUTPATIENT
Start: 2017-11-24 | End: 2017-11-25

## 2017-11-24 RX ORDER — RISPERIDONE 1 MG/1
1 TABLET, FILM COATED ORAL 2 TIMES DAILY
COMMUNITY
End: 2017-12-05 | Stop reason: HOSPADM

## 2017-11-24 RX ADMIN — RISPERIDONE 1 MG: 1 TABLET ORAL at 20:38

## 2017-11-24 RX ADMIN — METOPROLOL TARTRATE 25 MG: 25 TABLET, FILM COATED ORAL at 20:38

## 2017-11-24 RX ADMIN — ENOXAPARIN SODIUM 40 MG: 40 INJECTION SUBCUTANEOUS at 16:22

## 2017-11-24 RX ADMIN — SODIUM CHLORIDE 75 ML/HR: 0.9 INJECTION, SOLUTION INTRAVENOUS at 16:09

## 2017-11-24 RX ADMIN — LABETALOL 20 MG/4 ML (5 MG/ML) INTRAVENOUS SYRINGE 10 MG: at 18:49

## 2017-11-24 NOTE — ED NOTES
Patient transported to [facility] , [address] [facility]  11/24/17 3287

## 2017-11-24 NOTE — PLAN OF CARE
Potential for Falls     Patient will remain free of falls Progressing        Prexisting or High Potential for Compromised Skin Integrity     Skin integrity is maintained or improved Progressing        SAFETY,RESTRAINT: NV/NON-SELF DESTRUCTIVE BEHAVIOR     Remains free of harm/injury (restraint for non violent/non self-detsructive behavior) Progressing     Returns to optimal restraint-free functioning Progressing

## 2017-11-24 NOTE — ED PROVIDER NOTES
History  Chief Complaint   Patient presents with    Fall     Pt fell yesterday and the  found her on the floor and this morning the patients left side of her face is swollen  Pt denies blood thinners  80-year-old female, lives with her brother, increasing altered mental status the past number of years significantly worse over the past couple of months but drastically over the past 2 days  , previously thought to be solely due to psychiatric conditions, now thought to be more severe  Patient leaving a gas stove on, patient talking with people who been did for a number of years, patient constantly falling multiple head strike, unable to clean her care for herself  Patient lives with her 66-year-old brother states he is unable to care for her anymore  Patient's niece very concerned as she shows me pictures of the how she is living in, and as per the niece she lives in a hoarder house where there are high also upon piles of clothing everywhere, patient unable to walk        History provided by:  Patient and relative  History limited by:  Mental status change  Fall   Mechanism of injury: fall    Injury location:  Face and head/neck  Head/neck injury location:  Head  Facial injury location:  Face  Incident location:  Home  Time since incident:  1 day  Arrived directly from scene: no    Fall:     Fall occurred:  Walking and tripped    Impact surface:  Unable to specify    Point of impact:  Unable to specify  Protective equipment: none    Prior to arrival data:     Bystander interventions:  None    Blood loss:  None    Responsiveness at scene:  Responsive to pain    Orientation at scene: disorientated x3  Prior to Admission Medications   Prescriptions Last Dose Informant Patient Reported? Taking?    amLODIPine (NORVASC) 5 mg tablet   Yes Yes   Sig: Take 7 5 mg by mouth daily   atorvastatin (LIPITOR) 20 mg tablet   Yes Yes   Sig: Take 20 mg by mouth daily   furosemide (LASIX) 20 mg tablet   Yes Yes Sig: Take 20 mg by mouth daily   losartan (COZAAR) 100 MG tablet   Yes Yes   Sig: Take 100 mg by mouth daily   metoprolol tartrate (LOPRESSOR) 25 mg tablet   Yes Yes   Sig: Take 25 mg by mouth every 12 (twelve) hours   potassium chloride (K-DUR,KLOR-CON) 10 mEq tablet   Yes Yes   Sig: Take 10 mEq by mouth 2 (two) times a day   risperiDONE (RisperDAL) 0 5 mg tablet   No No   Sig: Take 1 tablet by mouth every morning for 30 days   risperiDONE (RisperDAL) 1 mg tablet   No Yes   Sig: Take 1 tablet by mouth daily at bedtime for 30 days      Facility-Administered Medications: None       Past Medical History:   Diagnosis Date    Anxiety     Depression     Hyperlipidemia     Hypertension     Psychiatric illness     Psychosis        Past Surgical History:   Procedure Laterality Date    APPENDECTOMY      CEREBRAL ANEURYSM REPAIR      HYSTERECTOMY         Family History   Problem Relation Age of Onset    No Known Problems Mother     No Known Problems Father     No Known Problems Sister     No Known Problems Brother     No Known Problems Maternal Aunt     No Known Problems Paternal Aunt     No Known Problems Maternal Uncle     No Known Problems Paternal Uncle     No Known Problems Maternal Grandfather     No Known Problems Maternal Grandmother     No Known Problems Paternal Grandfather     No Known Problems Paternal Grandmother     No Known Problems Cousin     ADD / ADHD Neg Hx     Alcohol abuse Neg Hx     Anxiety disorder Neg Hx     Bipolar disorder Neg Hx     Dementia Neg Hx     Depression Neg Hx     Drug abuse Neg Hx     OCD Neg Hx     Paranoid behavior Neg Hx     Schizophrenia Neg Hx     Seizures Neg Hx     Self-Injurious Behavior  Neg Hx     Suicide Attempts Neg Hx      I have reviewed and agree with the history as documented      Social History   Substance Use Topics    Smoking status: Former Smoker    Smokeless tobacco: Never Used    Alcohol use No        Review of Systems   Unable to perform ROS: Mental status change       Physical Exam  ED Triage Vitals   Temperature Pulse Respirations Blood Pressure SpO2   11/24/17 1235 11/24/17 1310 11/24/17 1235 11/24/17 1310 11/24/17 1310   98 8 °F (37 1 °C) 105 14 118/74 96 %      Temp Source Heart Rate Source Patient Position - Orthostatic VS BP Location FiO2 (%)   11/24/17 1235 11/24/17 1310 11/24/17 1310 11/24/17 1310 --   Oral Monitor Lying Right arm       Pain Score       11/24/17 1310       No Pain           Orthostatic Vital Signs  Vitals:    11/24/17 1310 11/24/17 1344   BP: 118/74 165/73   Pulse: 105 94   Patient Position - Orthostatic VS: Lying Lying       Physical Exam   Constitutional: She is oriented to person, place, and time  She appears distressed  Frail elderly, nonsensically communicative   HENT:   Head: Normocephalic and atraumatic  Eyes: Pupils are equal, round, and reactive to light  Neck: Normal range of motion  Neck supple  No tracheal deviation present  Cardiovascular: Normal rate, regular rhythm, normal heart sounds and intact distal pulses  No murmur heard  Pulmonary/Chest: Effort normal and breath sounds normal  No stridor  No respiratory distress  Abdominal: Soft  She exhibits no distension  There is no tenderness  There is no rebound and no guarding  Musculoskeletal: Normal range of motion  Neurological: She is alert and oriented to person, place, and time  No cranial nerve deficit or sensory deficit  She exhibits abnormal muscle tone  GCS eye subscore is 4  GCS verbal subscore is 4  GCS motor subscore is 5  Skin: Skin is warm and dry  She is not diaphoretic  No erythema  No pallor  Psychiatric: She has a normal mood and affect  Vitals reviewed        ED Medications  Medications - No data to display    Diagnostic Studies  Results Reviewed     Procedure Component Value Units Date/Time    Ammonia [[id number]]  (Abnormal) Collected:  11/24/17 1314    Lab Status:  Final result Specimen:  Blood from Arm, Right Updated:  11/24/17 1410     Ammonia <10 (L) umol/L     Basic metabolic panel [[id number]]  (Abnormal) Collected:  11/24/17 1314    Lab Status:  Final result Specimen:  Blood from Arm, Right Updated:  11/24/17 1401     Sodium 141 mmol/L      Potassium 5 2 mmol/L      Chloride 105 mmol/L      CO2 26 mmol/L      Anion Gap 10 mmol/L      BUN 14 mg/dL      Creatinine 0 81 mg/dL      Glucose 109 mg/dL      Calcium 11 0 (H) mg/dL      eGFR 78 ml/min/1 73sq m     Narrative:         National Kidney Disease Education Program recommendations are as follows:  GFR calculation is accurate only with a steady state creatinine  Chronic Kidney disease less than 60 ml/min/1 73 sq  meters  Kidney failure less than 15 ml/min/1 73 sq  meters  Hepatic function panel [[id number]]  (Abnormal) Collected:  11/24/17 1314    Lab Status:  Final result Specimen:  Blood from Arm, Right Updated:  11/24/17 1401     Total Bilirubin 1 00 mg/dL      Bilirubin, Direct 0 07 mg/dL      Alkaline Phosphatase 60 U/L      AST 46 (H) U/L      ALT 20 U/L      Total Protein 7 8 g/dL      Albumin 2 9 (L) g/dL     TSH [[id number]]  (Normal) Collected:  11/24/17 1314    Lab Status:  Final result Specimen:  Blood from Arm, Right Updated:  11/24/17 1400     TSH 3RD GENERATON 1 789 uIU/mL     Narrative:         Patients undergoing fluorescein dye angiography may retain small amounts of fluorescein in the body for 48-72 hours post procedure  Samples containing fluorescein can produce falsely depressed TSH values  If the patient had this procedure,a specimen should be resubmitted post fluorescein clearance            The recommended reference ranges for TSH during pregnancy are as follows:  First trimester 0 1 to 2 5 uIU/mL  Second trimester  0 2 to 3 0 uIU/mL  Third trimester 0 3 to 3 0 uIU/m      B-type natriuretic peptide [[id number]]  (Normal) Collected:  11/24/17 1314    Lab Status:  Final result Specimen:  Blood from Arm, Right Updated:  11/24/17 1400 NT-proBNP 232 pg/mL     Troponin I [[id number]]  (Normal) Collected:  11/24/17 1314    Lab Status:  Final result Specimen:  Blood from Arm, Right Updated:  11/24/17 1353     Troponin I 0 02 ng/mL     Narrative:         [employer] Chemistry analyzer 99% cutoff is > 0 04 ng/mL in network labs    o cTnI 99% cutoff is useful only when applied to patients in the clinical setting of myocardial ischemia  o cTnI 99% cutoff should be interpreted in the context of clinical history, ECG findings and possibly cardiac imaging to establish correct diagnosis  o cTnI 99% cutoff may be suggestive but clearly not indicative of a coronary event without the clinical setting of myocardial ischemia  Desire Reed [[id number]]  (Normal) Collected:  11/24/17 1314    Lab Status:  Final result Specimen:  Blood from Arm, Right Updated:  11/24/17 1343     Protime 13 3 seconds      INR 0 98    APTT [[id number]]  (Abnormal) Collected:  11/24/17 1314    Lab Status:  Final result Specimen:  Blood from Arm, Right Updated:  11/24/17 1343     PTT 36 (H) seconds     Narrative: Therapeutic Heparin Range = 60-90 seconds    UA w Reflex to Microscopic w Reflex to Culture [[id number]]  (Abnormal) Collected:  11/24/17 1319    Lab Status:  Final result Specimen:  Urine from Urine, Straight Cath Updated:  11/24/17 1329     Color, UA Yellow     Clarity, UA Slightly Cloudy     Specific [place], UA 1 020     pH, UA 6 0     Leukocytes, UA Negative     Nitrite, UA Negative     Protein, UA 30 (1+) (A) mg/dl      Glucose, UA Negative mg/dl      Ketones, UA Negative mg/dl      Urobilinogen, UA 4 0 (A) E U /dl      Bilirubin, UA Small (A)     Blood, UA Trace-lysed (A)    Urine Microscopic [[id number]] Collected:  11/24/17 1319    Lab Status:   In process Specimen:  Urine from Urine, Straight Cath Updated:  11/24/17 1328    CBC and differential [[id number]]  (Abnormal) Collected:  11/24/17 1314    Lab Status:  Final result Specimen:  Blood from Arm, Right Updated:  11/24/17 1326     WBC 11 75 (H) Thousand/uL      RBC 4 18 Million/uL      Hemoglobin 11 5 g/dL      Hematocrit 35 5 %      MCV 85 fL      MCH 27 5 pg      MCHC 32 4 g/dL      RDW 13 9 %      MPV 10 8 fL      Platelets 595 Thousands/uL      Neutrophils Relative 79 (H) %      Lymphocytes Relative 9 (L) %      Monocytes Relative 12 %      Eosinophils Relative 0 %      Basophils Relative 0 %      Neutrophils Absolute 9 21 (H) Thousands/µL      Lymphocytes Absolute 1 07 Thousands/µL      Monocytes Absolute 1 45 (H) Thousand/µL      Eosinophils Absolute 0 00 Thousand/µL      Basophils Absolute 0 02 Thousands/µL     Blood culture #2 [[id number]] Collected:  11/24/17 1316    Lab Status: In process Specimen:  Blood from Arm, Left Updated:  11/24/17 1323    Blood culture #1 [[id number]] Collected:  11/24/17 1314    Lab Status: In process Specimen:  Blood from Arm, Right Updated:  11/24/17 1322                 XR chest 2 views   Final Result by Adeline Garnica MD (11/24 2088)      No active pulmonary disease  Workstation performed: [id number]         CT head without contrast    (Results Pending)              Procedures  Procedures       Phone Contacts  ED Phone Contact    ED Course  ED Course                                MDM  Number of Diagnoses or Management Options  Delirium: new and requires workup  Hypercalcemia: new and requires workup  Diagnosis management comments: 80-year-old female unable follow basic commands, confused, acute on chronic process multiple falls differential is very broad      DDx includes but is not limited to:    Infectious etiology, pneumonia, UTI, less likely intra-abdominal pathology, traumatic brain injury, dementia      Initial ED Plan:   Patient is unsafe to be discharged home, but regardless will check blood work, CT scan        Amount and/or Complexity of Data Reviewed  Clinical lab tests: ordered and reviewed  Tests in the radiology section of CPT®: ordered and reviewed  Decide to obtain previous medical records or to obtain history from someone other than the patient: yes  Obtain history from someone other than the patient: yes  Review and summarize past medical records: yes  Discuss the patient with other providers: yes  Independent visualization of images, tracings, or specimens: yes      CritCare Time    Disposition  Final diagnoses:   Hypercalcemia   Delirium     Time reflects when diagnosis was documented in both MDM as applicable and the Disposition within this note     Time User Action Codes Description Comment    11/24/2017  2:10 PM Romana Sanger Add [E83 52] Hypercalcemia     11/24/2017  2:10 PM Merritt Boo[address] [R41 0] Delirium     11/24/2017  2:10 PM Romana Sanger Modify [E83 52] Hypercalcemia     11/24/2017  2:10 PM Romana Sanger Modify [R41 0] Delirium       ED Disposition     ED Disposition Condition Comment    Admit  Case was discussed with Dr Key Ibrahim and the patient's admission status was agreed to be Admission Status: inpatient status to the service of Dr eKy Ibrahim   Follow-up Information    None       Patient's Medications   Discharge Prescriptions    No medications on file     No discharge procedures on file      ED Provider  Electronically Signed by           Chey Price DO  11/24/17 6680

## 2017-11-24 NOTE — H&P
History and Physical - [facility] Internal Medicine    Patient Information: Ganesh Albarran 80 y o  female MRN: [id number]  Unit/Bed#: -01 Encounter: [id number]  Admitting Physician: Garo Saucedo PA-C  PCP: Su Salmeron MD  Date of Admission:  11/24/17    Assessment/Plan:    Hospital Problem List:     Principal Problem:    Acute encephalopathy  Active Problems:    Hypercalcemia    Leukocytosis    HTN (hypertension), benign    Peripheral edema    HLD (hyperlipidemia)      Plan for the Primary Problem(s):  · Acute encephalopathy:  · Unclear etiology  No evidence of acute infection  UA negative for infection, CXR negative  · CT head negative  No focal deficits on exam  No evidence of seizure activity  · DDX: psychiatric related vs  Medication induced vs  Infectious etiology   · TSH normal, ammonia negative  Check B12 and thiamine levels for reversible etiologys  · No RASHAD to suggest this as etiology, hypercalcemia could be contributing partially, but has been elevated previously for some time  · Hypercalcemia:  · Corrected calcium 11 9, PTH performed on 11/10/2017 was elevated at 128 5  · Suggestive of primary hyperparathyroidism  PTH has continued to rise over the past 8 months from   · Will give IV fluids and trend BMP    Plan for Additional Problems:   · Leukocytosis:  Only mildly elevated 11 75   · BLE edema: was to stop norvasc 2 weeks ago and change to metoprolol  Elevate legs, compression stockings  Hold lasix at this time  · HTN: losartan, metoprolol  No longer on norvasc or HCTZ  · History of psychosis/auditory and visual hallucinations: allscripts reviewed- on risperdal 1 mg BID  Will continue this  Consider psych consult if no improvement in mental status  · Back pain: new since arrival in the ER  Aqua K pad, monitor response  VTE Prophylaxis: Enoxaparin (Lovenox)  / sequential compression device   Code Status: FULL CODE   Discussed with daughter at bedside  POLST: There is no POLST form on file for this patient (pre-hospital)    Anticipated Length of Stay:  Patient will be admitted on an Inpatient basis with an anticipated length of stay of  > 2 midnights  Justification for Hospital Stay: hypercalcemia, altered mental status, unable to care for herself at home    Total Time for Visit, including Counseling / Coordination of Care: 1 hour  Greater than 50% of this total time spent on direct patient counseling and coordination of care  Chief Complaint:   Fall    History of Present Illness:    Clark Levy is a 80 y o  female who presents with an unwitnessed fall  The patient has past medical history of bilateral lower extremity edema, hypertension, hyperlipidemia, history of auditory and visual hallucinations, vitamin B12 deficiency, osteoporosis, hypercalcemia  The patient recently was hospitalized in March of 2017 secondary to increased paranoia, auditory and visual hallucinations  She was hospitalized in the inpatient psychiatric facility  She was noted to stay there for 8 days prior to discharge  She had been non compliant with her home medications which they felt was increasing her paranoia  ER notes that the patient lives with her brother who is 80years old; however I discussed with the patients daughter and brother  She resides in her own home and the brother visits her daily for around 1 hr per day  He brings her food  She is responsible for administering her own medications and they have no way of knowing if the patient is compliant with meds  The patient's niece was also present initially in the ER and raised concerns that the patient lives in a hoarding situation to the ER physician- showing pictures of the patients home with multiple piles of clothes, etc  The patients mental status was noted to significantly worsen over the past two weeks prompting the family to bring her in today   She left the gas stove on last week, fell once yesterday with unknown time down and one time last week as well  She was also telling her brother she moved chairs at home because her brother was there, who had passed away several years ago  Two weeks ago, the family report the patient could hold a full conversation and while confused at times, was never as bad as she is today  They report when they bring her food it is gone the next day  She was seen by PCP on 11/10 and according to the notes the patient had BLE edema but was well appearing and there is no mention of abnormal mental status  She denies headache, neck pain  She developed a backache since arrival in the ER which she feels is from sitting on the bed  She has no nausea, vomiting, diarrhea or abdominal pain  No fever or chills  No dysuria  Review of Systems:    Review of Systems   Unable to perform ROS: Mental status change   Constitutional: Negative for diaphoresis, fatigue and fever  Cardiovascular: Negative for chest pain, palpitations and leg swelling  Gastrointestinal: Negative for abdominal distention, diarrhea and nausea  Psychiatric/Behavioral: Positive for confusion and hallucinations  Negative for agitation  The patient is not nervous/anxious  All other systems reviewed and are negative  Past Medical and Surgical History:     Past Medical History:   Diagnosis Date    Anxiety     Depression     HLD (hyperlipidemia) 11/24/2017    HTN (hypertension), benign 11/24/2017    Peripheral edema 11/24/2017    Psychiatric illness     Psychosis        Past Surgical History:   Procedure Laterality Date    APPENDECTOMY      CEREBRAL ANEURYSM REPAIR      HYSTERECTOMY         Meds/Allergies:    Prior to Admission medications    Medication Sig Start Date End Date Taking?  Authorizing Provider   amLODIPine (NORVASC) 5 mg tablet Take 7 5 mg by mouth daily   Yes Historical Provider, MD   atorvastatin (LIPITOR) 20 mg tablet Take 20 mg by mouth daily   Yes Historical Provider, MD   furosemide (LASIX) 20 mg tablet Take 20 mg by mouth daily   Yes Historical Provider, MD   losartan (COZAAR) 100 MG tablet Take 100 mg by mouth daily   Yes Historical Provider, MD   metoprolol tartrate (LOPRESSOR) 25 mg tablet Take 25 mg by mouth every 12 (twelve) hours   Yes Historical Provider, MD   potassium chloride (K-DUR,KLOR-CON) 10 mEq tablet Take 10 mEq by mouth 2 (two) times a day   Yes Historical Provider, MD   risperiDONE (RisperDAL) 1 mg tablet Take 1 tablet by mouth daily at bedtime for 30 days 3/10/17 11/24/17 Yes Josh Cornejo MD   risperiDONE (RisperDAL) 0 5 mg tablet Take 1 tablet by mouth every morning for 30 days 3/10/17 4/9/17  Josh Cornejo MD     I have reviewed home medications using allscripts  Allergies: Allergies   Allergen Reactions    Dilantin [Phenytoin]        Social History:     Marital Status: Single   Occupation: retired  Patient Pre-hospital Living Situation: at home on her own   Brother stops by frequently  Patient Pre-hospital Level of Mobility: no assistive devices  Patient Pre-hospital Diet Restrictions: none  Substance Use History:   History   Alcohol Use No     History   Smoking Status    Former Smoker   Smokeless Tobacco    Never Used     History   Drug Use No       Family History:    Family History   Problem Relation Age of Onset    No Known Problems Mother     No Known Problems Father     No Known Problems Sister     No Known Problems Brother     No Known Problems Maternal Aunt     No Known Problems Paternal Aunt     No Known Problems Maternal Uncle     No Known Problems Paternal Uncle     No Known Problems Maternal Grandfather     No Known Problems Maternal Grandmother     No Known Problems Paternal Grandfather     No Known Problems Paternal Grandmother     No Known Problems Cousin     ADD / ADHD Neg Hx     Alcohol abuse Neg Hx     Anxiety disorder Neg Hx     Bipolar disorder Neg Hx     Dementia Neg Hx     Depression Neg Hx     Drug abuse Neg Hx     OCD Neg Hx     Paranoid behavior Neg Hx     Schizophrenia Neg Hx     Seizures Neg Hx     Self-Injurious Behavior  Neg Hx     Suicide Attempts Neg Hx        Physical Exam:     Vitals:   Blood Pressure: (!) 176/81 (11/24/17 1531)  Pulse: 95 (11/24/17 1531)  Temperature: 98 6 °F (37 °C) (11/24/17 1531)  Temp Source: Oral (11/24/17 1531)  Respirations: 14 (11/24/17 1531)  Height: 5' 4" (162 6 cm) (11/24/17 1531)  Weight - Scale: 60 9 kg (134 lb 4 2 oz) (11/24/17 1531)  SpO2: 97 % (11/24/17 1531)    Physical Exam   Constitutional: No distress  HENT:   Head: Normocephalic and atraumatic  Eyes: Conjunctivae are normal    Neck: No JVD present  Cardiovascular: Normal rate, regular rhythm, normal heart sounds and intact distal pulses  No murmur heard  Pulmonary/Chest: Effort normal and breath sounds normal  No respiratory distress  She has no wheezes  She has no rales  Abdominal: Soft  Bowel sounds are normal  She exhibits no distension  There is no tenderness  There is no rebound and no guarding  Musculoskeletal: She exhibits edema (BLE edema)  Neurological:   Awake, confused, pleasant  States she is at the hospital but unclear why  Asking for her brother, who is already present in the room  Raises arms off bed, moves legs equally  Tongue midline  Speech slowed  Unable to cooperate to perform F-N testing  Gait was not assessed  Skin: Skin is warm  Rash (chronic venous stasis changes) noted  She is not diaphoretic  Nursing note and vitals reviewed  Additional Data:     Lab Results: I have personally reviewed pertinent reports          Results from last 7 days  Lab Units 11/24/17  1314   WBC Thousand/uL 11 75*   HEMOGLOBIN g/dL 11 5   HEMATOCRIT % 35 5   PLATELETS Thousands/uL 316   NEUTROS PCT % 79*   LYMPHS PCT % 9*   MONOS PCT % 12   EOS PCT % 0       Results from last 7 days  Lab Units 11/24/17  1314   SODIUM mmol/L 141   POTASSIUM mmol/L 5 2   CHLORIDE mmol/L 105   CO2 mmol/L 26   BUN mg/dL 14   CREATININE mg/dL 0 81   CALCIUM mg/dL 11 0*   TOTAL PROTEIN g/dL 7 8   BILIRUBIN TOTAL mg/dL 1 00   ALK PHOS U/L 60   ALT U/L 20   AST U/L 46*   GLUCOSE RANDOM mg/dL 109       Results from last 7 days  Lab Units 11/24/17  1314   INR  0 98       Imaging: I have personally reviewed pertinent reports  Xr Chest 2 Views  Result Date: 11/24/2017  Impression: No active pulmonary disease  Ct Head Without Contrast  Result Date: 11/24/2017  Impression: No acute intracranial hemorrhage seen  No CT signs of acute territorial infarction       EKG, Pathology, and Other Studies Reviewed on Admission:   · Outpatient records    Allscripts / Epic Records Reviewed: Yes     ** Please Note: This note has been constructed using a voice recognition system   **

## 2017-11-25 LAB
ALBUMIN SERPL BCP-MCNC: 2.3 G/DL (ref 3.5–5)
ALP SERPL-CCNC: 45 U/L (ref 46–116)
ALT SERPL W P-5'-P-CCNC: 15 U/L (ref 12–78)
ANION GAP SERPL CALCULATED.3IONS-SCNC: 9 MMOL/L (ref 4–13)
AST SERPL W P-5'-P-CCNC: 21 U/L (ref 5–45)
ATRIAL RATE: 127 BPM
BILIRUB SERPL-MCNC: 0.7 MG/DL (ref 0.2–1)
BUN SERPL-MCNC: 10 MG/DL (ref 5–25)
CALCIUM SERPL-MCNC: 9.9 MG/DL (ref 8.3–10.1)
CHLORIDE SERPL-SCNC: 110 MMOL/L (ref 100–108)
CO2 SERPL-SCNC: 26 MMOL/L (ref 21–32)
CREAT SERPL-MCNC: 0.73 MG/DL (ref 0.6–1.3)
ERYTHROCYTE [DISTWIDTH] IN BLOOD BY AUTOMATED COUNT: 13.9 % (ref 11.6–15.1)
GFR SERPL CREATININE-BSD FRML MDRD: 89 ML/MIN/1.73SQ M
GLUCOSE SERPL-MCNC: 106 MG/DL (ref 65–140)
HCT VFR BLD AUTO: 29.3 % (ref 34.8–46.1)
HGB BLD-MCNC: 9.2 G/DL (ref 11.5–15.4)
MAGNESIUM SERPL-MCNC: 1.1 MG/DL (ref 1.6–2.6)
MCH RBC QN AUTO: 26.7 PG (ref 26.8–34.3)
MCHC RBC AUTO-ENTMCNC: 31.4 G/DL (ref 31.4–37.4)
MCV RBC AUTO: 85 FL (ref 82–98)
P AXIS: 57 DEGREES
PHOSPHATE SERPL-MCNC: 2.3 MG/DL (ref 2.3–4.1)
PLATELET # BLD AUTO: 275 THOUSANDS/UL (ref 149–390)
PMV BLD AUTO: 10.7 FL (ref 8.9–12.7)
POTASSIUM SERPL-SCNC: 2.7 MMOL/L (ref 3.5–5.3)
PR INTERVAL: 162 MS
PROT SERPL-MCNC: 5.8 G/DL (ref 6.4–8.2)
QRS AXIS: 35 DEGREES
QRSD INTERVAL: 74 MS
QT INTERVAL: 262 MS
QTC INTERVAL: 380 MS
RBC # BLD AUTO: 3.44 MILLION/UL (ref 3.81–5.12)
SODIUM SERPL-SCNC: 145 MMOL/L (ref 136–145)
T WAVE AXIS: 32 DEGREES
VENTRICULAR RATE: 127 BPM
VIT B12 SERPL-MCNC: 796 PG/ML (ref 100–900)
WBC # BLD AUTO: 8.09 THOUSAND/UL (ref 4.31–10.16)

## 2017-11-25 PROCEDURE — 84100 ASSAY OF PHOSPHORUS: CPT | Performed by: PHYSICIAN ASSISTANT

## 2017-11-25 PROCEDURE — 84425 ASSAY OF VITAMIN B-1: CPT | Performed by: PHYSICIAN ASSISTANT

## 2017-11-25 PROCEDURE — 82607 VITAMIN B-12: CPT | Performed by: PHYSICIAN ASSISTANT

## 2017-11-25 PROCEDURE — 83735 ASSAY OF MAGNESIUM: CPT | Performed by: PHYSICIAN ASSISTANT

## 2017-11-25 PROCEDURE — 85027 COMPLETE CBC AUTOMATED: CPT | Performed by: PHYSICIAN ASSISTANT

## 2017-11-25 PROCEDURE — 80053 COMPREHEN METABOLIC PANEL: CPT | Performed by: PHYSICIAN ASSISTANT

## 2017-11-25 RX ORDER — METOPROLOL TARTRATE 5 MG/5ML
5 INJECTION INTRAVENOUS EVERY 6 HOURS PRN
Status: DISCONTINUED | OUTPATIENT
Start: 2017-11-25 | End: 2017-11-30

## 2017-11-25 RX ORDER — POLYVINYL ALCOHOL 14 MG/ML
1 SOLUTION/ DROPS OPHTHALMIC
Status: DISCONTINUED | OUTPATIENT
Start: 2017-11-25 | End: 2017-12-05 | Stop reason: HOSPADM

## 2017-11-25 RX ORDER — POTASSIUM CHLORIDE 20 MEQ/1
40 TABLET, EXTENDED RELEASE ORAL ONCE
Status: COMPLETED | OUTPATIENT
Start: 2017-11-25 | End: 2017-11-25

## 2017-11-25 RX ORDER — MAGNESIUM SULFATE HEPTAHYDRATE 40 MG/ML
4 INJECTION, SOLUTION INTRAVENOUS ONCE
Status: COMPLETED | OUTPATIENT
Start: 2017-11-25 | End: 2017-12-05

## 2017-11-25 RX ORDER — SODIUM CHLORIDE 9 MG/ML
50 INJECTION, SOLUTION INTRAVENOUS CONTINUOUS
Status: DISCONTINUED | OUTPATIENT
Start: 2017-11-25 | End: 2017-11-25

## 2017-11-25 RX ORDER — POTASSIUM CHLORIDE 14.9 MG/ML
20 INJECTION INTRAVENOUS ONCE
Status: COMPLETED | OUTPATIENT
Start: 2017-11-25 | End: 2017-12-05

## 2017-11-25 RX ADMIN — LOSARTAN POTASSIUM 100 MG: 50 TABLET ORAL at 08:14

## 2017-11-25 RX ADMIN — POTASSIUM CHLORIDE 20 MEQ: 200 INJECTION, SOLUTION INTRAVENOUS at 08:13

## 2017-11-25 RX ADMIN — POLYVINYL ALCOHOL 1 DROP: 14 SOLUTION/ DROPS OPHTHALMIC at 17:05

## 2017-11-25 RX ADMIN — METOPROLOL TARTRATE 5 MG: 5 INJECTION, SOLUTION INTRAVENOUS at 22:49

## 2017-11-25 RX ADMIN — DOCUSATE SODIUM 100 MG: 100 CAPSULE, LIQUID FILLED ORAL at 08:14

## 2017-11-25 RX ADMIN — RISPERIDONE 1 MG: 1 TABLET ORAL at 08:14

## 2017-11-25 RX ADMIN — MAGNESIUM SULFATE HEPTAHYDRATE 4 G: 40 INJECTION, SOLUTION INTRAVENOUS at 10:51

## 2017-11-25 RX ADMIN — POTASSIUM CHLORIDE 20 MEQ: 200 INJECTION, SOLUTION INTRAVENOUS at 16:30

## 2017-11-25 RX ADMIN — ENOXAPARIN SODIUM 40 MG: 40 INJECTION SUBCUTANEOUS at 08:15

## 2017-11-25 RX ADMIN — METOPROLOL TARTRATE 25 MG: 25 TABLET, FILM COATED ORAL at 08:14

## 2017-11-25 RX ADMIN — SODIUM CHLORIDE 75 ML/HR: 0.9 INJECTION, SOLUTION INTRAVENOUS at 05:13

## 2017-11-25 RX ADMIN — POTASSIUM CHLORIDE 40 MEQ: 1500 TABLET, EXTENDED RELEASE ORAL at 08:14

## 2017-11-25 NOTE — CASE MANAGEMENT
Initial Clinical Review    Admission: Date/Time/Statement: 11/24/17 @ 1409     Orders Placed This Encounter   Procedures    Inpatient Admission (expected length of stay for this patient is greater than two midnights)     Standing Status:   Standing     Number of Occurrences:   1     Order Specific Question:   Admitting Physician     Answer:   Ayo Evans     Order Specific Question:   Level of Care     Answer:   Med Surg [16]     Order Specific Question:   Estimated length of stay     Answer:   More than 2 Midnights     Order Specific Question:   Certification     Answer:   I certify that inpatient services are medically necessary for this patient for a duration of greater than two midnights  See H&P and MD Progress Notes for additional information about the patient's course of treatment  ED: Date/Time/Mode of Arrival:   ED Arrival Information     Expected Arrival Acuity Means of Arrival Escorted By Service Admission Type    - 11/24/2017 12:27 Urgent Wheelchair Family Member General Medicine Urgent    Arrival Complaint    CVA symptoms          Chief Complaint:   Chief Complaint   Patient presents with    Fall     Pt fell yesterday and the  found her on the floor and this morning the patients left side of her face is swollen  Pt denies blood thinners  History of Illness: 80 y o  female who presents with an unwitnessed fall  The patient has past medical history of bilateral lower extremity edema, hypertension, hyperlipidemia, history of auditory and visual hallucinations, vitamin B12 deficiency, osteoporosis, hypercalcemia  The patient recently was hospitalized in March of 2017 secondary to increased paranoia, auditory and visual hallucinations  She was hospitalized in the inpatient psychiatric facility  She was noted to stay there for 8 days prior to discharge   She had been non compliant with her home medications which they felt was increasing her paranoia        ER notes that the patient lives with her brother who is 80years old; however I discussed with the patients daughter and brother  She resides in her own home and the brother visits her daily for around 1 hr per day  He brings her food  She is responsible for administering her own medications and they have no way of knowing if the patient is compliant with meds  The patient's niece was also present initially in the ER and raised concerns that the patient lives in a hoarding situation to the ER physician- showing pictures of the patients home with multiple piles of clothes, etc  The patients mental status was noted to significantly worsen over the past two weeks prompting the family to bring her in today  She left the gas stove on last week, fell once yesterday with unknown time down and one time last week as well  She was also telling her brother she moved chairs at home because her brother was there, who had passed away several years ago  Two weeks ago, the family report the patient could hold a full conversation and while confused at times, was never as bad as she is today  They report when they bring her food it is gone the next day      She was seen by PCP on 11/10 and according to the notes the patient had BLE edema but was well appearing and there is no mention of abnormal mental status         ED Vital Signs:   ED Triage Vitals   Temperature Pulse Respirations Blood Pressure SpO2   11/24/17 1235 11/24/17 1310 11/24/17 1235 11/24/17 1310 11/24/17 1310   98 8 °F (37 1 °C) 105 14 118/74 96 %      Temp Source Heart Rate Source Patient Position - Orthostatic VS BP Location FiO2 (%)   11/24/17 1235 11/24/17 1310 11/24/17 1310 11/24/17 1310 --   Oral Monitor Lying Right arm       Pain Score       11/24/17 1310       No Pain        Wt Readings from Last 1 Encounters:   11/24/17 60 9 kg (134 lb 4 2 oz)       Vital Signs (abnormal):  Maximum /81    Abnormal Labs/Diagnostic Test Results:   Calcium 11    ast 46    Albumin 2  9    UA 1+ protein  4 0 urobilinogen  Small bilirubin   Trace blood  Wbc 11 75    Ct head - No acute intracranial hemorrhage seen   No CT signs of acute territorial infarction  Stable CT    Labs 11/25 am - magnesium 1 1    K 2 7  Cl 110  Alkaline phosphatase 45  Total protein 5 8  Albumin 2  3    hgb 9 2, hct 29 3    ED Treatment: blood cultures  Medication Administration from 11/24/2017 1227 to 11/24/2017 1534     None          Past Medical/Surgical History: Active Ambulatory Problems     Diagnosis Date Noted    Psychosis 03/03/2017    Hyperlipidemia      Resolved Ambulatory Problems     Diagnosis Date Noted    No Resolved Ambulatory Problems     Past Medical History:   Diagnosis Date    Anxiety     Depression     HLD (hyperlipidemia) 11/24/2017    HTN (hypertension), benign 11/24/2017    Peripheral edema 11/24/2017    Psychiatric illness     Psychosis        Admitting Diagnosis: Hypercalcemia [E83 52]  Delirium [R41 0]  Symptoms of cerebrovascular accident (CVA) [R29 90]    Age/Sex: 80 y o  female  Assessment/Plan: Acute encephalopathy:  · Unclear etiology  No evidence of acute infection  UA negative for infection, CXR negative  · CT head negative  No focal deficits on exam  No evidence of seizure activity  · DDX: psychiatric related vs  Medication induced vs  Infectious etiology   · TSH normal, ammonia negative  Check B12 and thiamine levels for reversible etiologys  · No RASHAD to suggest this as etiology, hypercalcemia could be contributing partially, but has been elevated previously for some time  · Hypercalcemia:  ? Corrected calcium 11 9, PTH performed on 11/10/2017 was elevated at 128 5   ? Suggestive of primary hyperparathyroidism  PTH has continued to rise over the past 8 months from   ? Will give IV fluids and trend BMP     Plan for Additional Problems:   · Leukocytosis:  Only mildly elevated 11 75   · BLE edema: was to stop norvasc 2 weeks ago and change to metoprolol   Elevate legs, compression stockings  Hold lasix at this time  · HTN: losartan, metoprolol  No longer on norvasc or HCTZ  · History of psychosis/auditory and visual hallucinations: allscripts reviewed- on risperdal 1 mg BID  Will continue this  Consider psych consult if no improvement in mental status  Back pain: new since arrival in the ER  Aqua K pad, monitor response  Admission Orders:  11/24/2017  1409 INPATIENT   Scheduled Meds:   atorvastatin 20 mg Oral QPM   docusate sodium 100 mg Oral BID   enoxaparin 40 mg Subcutaneous Daily   losartan 100 mg Oral Daily   magnesium sulfate 4 g Intravenous Once   metoprolol tartrate 25 mg Oral Q12H [address]   potassium chloride 20 mEq Intravenous Once   risperiDONE 1 mg Oral BID     Continuous Infusions:    PRN Meds:   acetaminophen    Labetalol  10 mg iv - used x 1      Ondansetron    OTHER ORDERS: restraints - non violent  Continual observation   scds  PT/OT      [facility] in the [facility] by Nima Chin for 2017  Network Utilization Review Department  Phone: [phone]; Fax [phone]  ATTENTION: The Network Utilization Review Department is now centralized for our 7 Facilities  Make a note that we have a new phone and fax numbers for our Department  Please call with any questions or concerns to [phone] and carefully follow the prompts so that you are directed to the right person  All voicemails are confidential  Fax any determinations, approvals, denials, and requests for initial or continue stay review clinical to [phone]  Due to HIGH CALL volume, it would be easier if you could please send faxed requests to expedite your requests and in part, help us provide discharge notifications faster

## 2017-11-25 NOTE — PLAN OF CARE
Problem: DISCHARGE PLANNING - CARE MANAGEMENT  Goal: Discharge to post-acute care or home with appropriate resources  INTERVENTIONS:  - Conduct assessment to determine patient/family and health care team treatment goals, and need for post-acute services based on payer coverage, community resources, and patient preferences, and barriers to discharge  - Address psychosocial, clinical, and financial barriers to discharge as identified in assessment in conjunction with the patient/family and health care team  - Arrange appropriate level of post-acute services according to patients   needs and preference and payer coverage in collaboration with the physician and health care team  - Communicate with and update the patient/family, physician, and health care team regarding progress on the discharge plan  - Arrange appropriate transportation to post-acute venues  Outcome: Progressing  CM met with Pt and Pt's daughter Marilyn Matos at bedside  Pt was confused and CM completed open assessment with Pt's daughter  Per Pt's daughter she just noticed the confusion recently and more as the day goes on  Prior to admission, Pt was living alone in a [facility] with 2 floors with 2 ADRIEL  Pt uses a cane and was mostly independent with ADL's and ambulation, Pt's brother brought food for Pt  Pt does not have a HHC or Rehab history  Pt uses [facility] pharmacy and has Rx coverage  Pt does not have any D&A history  Pt's daughter reports that Pt had a brain injury a few years ago and has been declining since  Pt does not have a POA, CM provided daughter with information  Pt does not drive, her brother transports her to appointments  CM discussed that PT/OT would do evaluations with Pt to determine recommendation for after discharge  CM discussed STR with Pt's daughter and provided SNF list along with list of SNF's in network with Pt's insurance  CM discussed options as far as long term care, assisted living, and daughter moving in with Pt     EVANGELISTA provided Pt with CM's business card for CM covering this upcoming week  CM dept will follow Pt until discharge

## 2017-11-25 NOTE — PROGRESS NOTES
[facility] Progress Note  Patient: Nathan Fisher 80 y o  female   MRN: [id number]  PCP: Cassie Kennedy MD  Unit/Bed#: [facility]-01 Encounter: [id number]  Date Of Visit: 17    Assessment:    Principal Problem:    Acute encephalopathy  Active Problems:    Hypercalcemia    Leukocytosis    HTN (hypertension), benign    Peripheral edema    HLD (hyperlipidemia)      Plan:    · Encephalopathy likely metabolic improving  · Hypercalcemia likely PTH dependent calcium levels improved with IV fluids hold IV fluids and monitor calcium levels  · Advanced dementia with behavioral changes monitor for delirium, continue to monitor, may need psychiatry consultation  · Ambulatory dysfunction multifactorial  · Hypertension  · Out of bed as able      VTE Pharmacologic Prophylaxis:   Pharmacologic: Enoxaparin (Lovenox)  Mechanical VTE Prophylaxis in Place: Yes    Patient Centered Rounds: I have performed bedside rounds with nursing staff today  Discussions with Specialists or Other Care Team Provider:     Education and Discussions with Family / Patient:  Discussed with the brother and her daughter who are at bedside in detail, questions answered    Time Spent for Care: 30 minutes  More than 50% of total time spent on counseling and coordination of care as described above      Current Length of Stay: 1 day(s)    Current Patient Status: Inpatient   Certification Statement: The patient will continue to require additional inpatient hospital stay due to As mentioned    Discharge Plan / Estimated Discharge Date:  Encephalopathy/delirium improving, may likely need placement on discharge discussed with case management and family    Code Status: Level 1 - Full Code      Subjective:     Comfortably lying in bed      Objective:     Vitals:   Temp (24hrs), Av 1 °F (36 7 °C), Min:97 6 °F (36 4 °C), Max:98 6 °F (37 °C)    HR:  [78-95] 82  Resp:  [14-18] 18  BP: (117-176)/(59-81) 132/60  SpO2:  [97 %-100 %] 97 %  Body mass index is 23 05 kg/m²  Input and Output Summary (last 24 hours): Intake/Output Summary (Last 24 hours) at 11/25/17 1336  Last data filed at 11/25/17 1242   Gross per 24 hour   Intake                0 ml   Output                0 ml   Net                0 ml       Physical Exam:     Physical Exam     Comfortably lying in bed  Neck supple  Lungs diminished breath sounds bilaterally  Heart sounds no murmurs appreciable  Abdomen soft  Pulses present  Bilateral chronic venous stasis/lymphedema changes noted  Delirium  Dementia noted        Additional Data:     Labs:      Results from last 7 days  Lab Units 11/25/17  0508 11/24/17  1314   WBC Thousand/uL 8 09 11 75*   HEMOGLOBIN g/dL 9 2* 11 5   HEMATOCRIT % 29 3* 35 5   PLATELETS Thousands/uL 275 316   NEUTROS PCT %  --  79*   LYMPHS PCT %  --  9*   MONOS PCT %  --  12   EOS PCT %  --  0       Results from last 7 days  Lab Units 11/25/17  0508   SODIUM mmol/L 145   POTASSIUM mmol/L 2 7*   CHLORIDE mmol/L 110*   CO2 mmol/L 26   BUN mg/dL 10   CREATININE mg/dL 0 73   CALCIUM mg/dL 9 9   TOTAL PROTEIN g/dL 5 8*   BILIRUBIN TOTAL mg/dL 0 70   ALK PHOS U/L 45*   ALT U/L 15   AST U/L 21   GLUCOSE RANDOM mg/dL 106       Results from last 7 days  Lab Units 11/24/17  1314   INR  0 98       * I Have Reviewed All Lab Data Listed Above  * Additional Pertinent Lab Tests Reviewed:  All Labs Within Last 24 Hours Reviewed    Imaging:    Imaging Reports Reviewed Today Include:   Imaging Personally Reviewed by Myself Includes:     Recent Cultures (last 7 days):           Last 24 Hours Medication List:     atorvastatin 20 mg Oral QPM   docusate sodium 100 mg Oral BID   enoxaparin 40 mg Subcutaneous Daily   losartan 100 mg Oral Daily   magnesium sulfate 4 g Intravenous Once   metoprolol tartrate 25 mg Oral Q12H [address]   potassium chloride 20 mEq Intravenous Once   risperiDONE 1 mg Oral BID        Today, Patient Was Seen By: Dwayne Chow MD    ** Please Note: This note has been constructed using a voice recognition system   **

## 2017-11-25 NOTE — NUTRITION
11/25/17 1547   Assessment   Timepoint Initial  (Screen -Restraints)   Labs   List Completed Labs (11/25 K 2 7, Alb 2 3, Magnesium 1 1  Meds reviewed)   Feeding Route   PO With assist   Adequacy of Intake   Nutrition Modality PO  (Regular Diet)   Intake Meals 0-25%  (0% x 3 meals data)   Estimated calorie intake compared to estimated need Not meeting estimated energy needs   Nutrition Prognosis   Potential Guarded   Nutrition Concerns Elderly; Other (Comment)   Comorbid Concerns Depression  (Psychosis,HTN, HLD)   Nutrition Considerations Ability to learn  (Nutrition education not appropriate)   PES Statement   Problem Intake   Oral or Nutritional Support Intake (2) Inadequate oral intake NI-2 1   Related to Confusion   As evidenced by: Reported intake < usual   Patient Nutrition Goals   Goal increase Kcal/Pro;adequate hydration   Goal Status initiated   Timeframe to complete goal by next f/u   Recommendations/Interventions   Summary Patient with Psychosis and refusing meals x 3   Will provide Ensure Enlive to supply macronutrients in liquid form, Encourage PO intake  May need BH intervention, monitor plan of care  Interventions Diet: continued as ordered; Supplement initiate   Nutrition Recommendations Other (specify)  (Provide Ensure Enlive TID)   Nutrition Complexity Risk   Nutrition complexity level High risk   Nutrition review: 11/27/17  (refusing meals-chk PO)   Follow up date 11/30/17

## 2017-11-26 PROBLEM — E87.6 HYPOKALEMIA: Status: ACTIVE | Noted: 2017-11-26

## 2017-11-26 PROBLEM — G30.9 ALZHEIMER'S DISEASE (HCC): Status: ACTIVE | Noted: 2017-11-26

## 2017-11-26 PROBLEM — F02.80 ALZHEIMER'S DISEASE (HCC): Status: ACTIVE | Noted: 2017-11-26

## 2017-11-26 PROBLEM — L60.8 TOENAIL DEFORMITY: Status: ACTIVE | Noted: 2017-11-26

## 2017-11-26 LAB
ANION GAP SERPL CALCULATED.3IONS-SCNC: 6 MMOL/L (ref 4–13)
ATRIAL RATE: 94 BPM
BUN SERPL-MCNC: 7 MG/DL (ref 5–25)
CALCIUM SERPL-MCNC: 9.8 MG/DL (ref 8.3–10.1)
CHLORIDE SERPL-SCNC: 107 MMOL/L (ref 100–108)
CO2 SERPL-SCNC: 28 MMOL/L (ref 21–32)
CREAT SERPL-MCNC: 0.64 MG/DL (ref 0.6–1.3)
GFR SERPL CREATININE-BSD FRML MDRD: 96 ML/MIN/1.73SQ M
GLUCOSE SERPL-MCNC: 104 MG/DL (ref 65–140)
MAGNESIUM SERPL-MCNC: 1.7 MG/DL (ref 1.6–2.6)
P AXIS: 83 DEGREES
POTASSIUM SERPL-SCNC: 3.5 MMOL/L (ref 3.5–5.3)
PR INTERVAL: 152 MS
QRS AXIS: 40 DEGREES
QRSD INTERVAL: 62 MS
QT INTERVAL: 334 MS
QTC INTERVAL: 404 MS
SODIUM SERPL-SCNC: 141 MMOL/L (ref 136–145)
T WAVE AXIS: 56 DEGREES
VENTRICULAR RATE: 88 BPM

## 2017-11-26 PROCEDURE — G8979 MOBILITY GOAL STATUS: HCPCS

## 2017-11-26 PROCEDURE — 0HBRXZZ EXCISION OF TOE NAIL, EXTERNAL APPROACH: ICD-10-PCS | Performed by: PODIATRIST

## 2017-11-26 PROCEDURE — G8978 MOBILITY CURRENT STATUS: HCPCS

## 2017-11-26 PROCEDURE — 80048 BASIC METABOLIC PNL TOTAL CA: CPT | Performed by: INTERNAL MEDICINE

## 2017-11-26 PROCEDURE — 97163 PT EVAL HIGH COMPLEX 45 MIN: CPT

## 2017-11-26 PROCEDURE — 83735 ASSAY OF MAGNESIUM: CPT | Performed by: INTERNAL MEDICINE

## 2017-11-26 PROCEDURE — 92610 EVALUATE SWALLOWING FUNCTION: CPT

## 2017-11-26 RX ADMIN — ENOXAPARIN SODIUM 40 MG: 40 INJECTION SUBCUTANEOUS at 10:00

## 2017-11-26 NOTE — CONSULTS
Consult - Podiatry   Jennie Cunha 80 y o  female MRN: [id number]  Unit/Bed#: -01 Encounter: [id number]    Assessment/Plan     Assessment:  1  Onychomycosis x 10    Plan:  1  Nails reduced of length and thickness at bedside    History of Present Illness     HPI:  Bari Sandoval is a 80 y o  female who presents with long, thick toenails  Consults  Review of Systems   Constitutional: Negative  HENT: Negative  Eyes: Negative  Respiratory: Negative  Cardiovascular: Negative  Gastrointestinal: Negative  Musculoskeletal: Negative   Skin:Long toenails   Neurological: Negative  Psych: negative         Historical Information   Past Medical History:   Diagnosis Date    Anxiety     Depression     HLD (hyperlipidemia) 11/24/2017    HTN (hypertension), benign 11/24/2017    Peripheral edema 11/24/2017    Psychiatric illness     Psychosis      Past Surgical History:   Procedure Laterality Date    APPENDECTOMY      CEREBRAL ANEURYSM REPAIR      HYSTERECTOMY       Social History   History   Alcohol Use No     History   Drug Use No     History   Smoking Status    Former Smoker   Smokeless Tobacco    Never Used     Family History:   Family History   Problem Relation Age of Onset    No Known Problems Mother     No Known Problems Father     No Known Problems Sister     No Known Problems Brother     No Known Problems Maternal Aunt     No Known Problems Paternal Aunt     No Known Problems Maternal Uncle     No Known Problems Paternal Uncle     No Known Problems Maternal Grandfather     No Known Problems Maternal Grandmother     No Known Problems Paternal Grandfather     No Known Problems Paternal Grandmother     No Known Problems Cousin     ADD / ADHD Neg Hx     Alcohol abuse Neg Hx     Anxiety disorder Neg Hx     Bipolar disorder Neg Hx     Dementia Neg Hx     Depression Neg Hx     Drug abuse Neg Hx     OCD Neg Hx     Paranoid behavior Neg Hx     Schizophrenia Neg Hx     Seizures Neg Hx     Self-Injurious Behavior  Neg Hx     Suicide Attempts Neg Hx        Meds/Allergies   Prescriptions Prior to Admission   Medication    atorvastatin (LIPITOR) 20 mg tablet    furosemide (LASIX) 20 mg tablet    losartan (COZAAR) 100 MG tablet    metoprolol tartrate (LOPRESSOR) 25 mg tablet    potassium chloride (K-DUR,KLOR-CON) 10 mEq tablet    risperiDONE (RisperDAL) 1 mg tablet    risperiDONE (RisperDAL) 0 5 mg tablet     Allergies   Allergen Reactions    Dilantin [Phenytoin]        Objective   First Vitals:   Blood Pressure: 118/74 (11/24/17 1310)  Pulse: 105 (11/24/17 1310)  Temperature: 98 8 °F (37 1 °C) (11/24/17 1235)  Temp Source: Oral (11/24/17 1235)  Respirations: 14 (11/24/17 1235)  Height: 5' 4" (162 6 cm) (11/24/17 1531)  Weight - Scale: 60 9 kg (134 lb 4 2 oz) (11/24/17 1235)  SpO2: 96 % (11/24/17 1310)    Current Vitals:   Blood Pressure: 141/65 (11/26/17 1500)  Pulse: 93 (11/26/17 1500)  Temperature: 100 1 °F (37 8 °C) (11/26/17 1500)  Temp Source: Axillary (11/26/17 1500)  Respirations: 18 (11/26/17 1500)  Height: 5' 4" (162 6 cm) (11/25/17 1544)  Weight - Scale: 60 9 kg (134 lb 4 2 oz) (11/24/17 1531)  SpO2: 98 % (11/26/17 1500)        /65   Pulse 93   Temp 100 1 °F (37 8 °C) (Axillary)   Resp 18   Ht 5' 4" (1 626 m)   Wt 60 9 kg (134 lb 4 2 oz)   SpO2 98%   BMI 23 05 kg/m²      General Appearance:    Alert, cooperative, no distress   Head:    Normocephalic, without obvious abnormality, atraumatic   Eyes:    PERRL, conjunctiva/corneas clear, EOM's intact        Nose:   Moist mucous membranes   Neck:   Supple, symmetrical, trachea midline   Back:     Symmetric   Lungs:     Respirations unlabored   Heart:    Regular rate and rhythm, S1 and S2 normal, no murmur, rub   or gallop   Abdomen:     Soft, non-tender   Extremities:   Non-pertinent   Pulses:   Intact both feet   Skin:   Her nails are brittle, hypertrophic, elongated and discolored with subungual debris x 10    Neurologic:   Gross sensation is intact  Protective sensation is intact  Lab Results:   Admission on 11/24/2017   Component Date Value    WBC 11/24/2017 11 75*    RBC 11/24/2017 4 18     Hemoglobin 11/24/2017 11 5     Hematocrit 11/24/2017 35 5     MCV 11/24/2017 85     MCH 11/24/2017 27 5     MCHC 11/24/2017 32 4     RDW 11/24/2017 13 9     MPV 11/24/2017 10 8     Platelets 70/38/4431 316     Neutrophils Relative 11/24/2017 79*    Lymphocytes Relative 11/24/2017 9*    Monocytes Relative 11/24/2017 12     Eosinophils Relative 11/24/2017 0     Basophils Relative 11/24/2017 0     Neutrophils Absolute 11/24/2017 9 21*    Lymphocytes Absolute 11/24/2017 1 07     Monocytes Absolute 11/24/2017 1 45*    Eosinophils Absolute 11/24/2017 0 00     Basophils Absolute 11/24/2017 0 02     Protime 11/24/2017 13 3     INR 11/24/2017 0 98     PTT 11/24/2017 36*    Sodium 11/24/2017 141     Potassium 11/24/2017 5 2     Chloride 11/24/2017 105     CO2 11/24/2017 26     Anion Gap 11/24/2017 10     BUN 11/24/2017 14     Creatinine 11/24/2017 0 81     Glucose 11/24/2017 109     Calcium 11/24/2017 11 0*    eGFR 11/24/2017 78     Total Bilirubin 11/24/2017 1 00     Bilirubin, Direct 11/24/2017 0 07     Alkaline Phosphatase 11/24/2017 60     AST 11/24/2017 46*    ALT 11/24/2017 20     Total Protein 11/24/2017 7 8     Albumin 11/24/2017 2 9*    TSH 3RD GENERATON 11/24/2017 1 789     Blood Culture 11/26/2017 No Growth at 48 hrs   Blood Culture 11/26/2017 No Growth at 48 hrs       Color, UA 11/24/2017 Yellow     Clarity, UA 11/24/2017 Slightly Cloudy     Specific Gravity, UA 11/24/2017 1 020     pH, UA 11/24/2017 6 0     Leukocytes, UA 11/24/2017 Negative     Nitrite, UA 11/24/2017 Negative     Protein, UA 11/24/2017 30 (1+)*    Glucose, UA 11/24/2017 Negative     Ketones, UA 11/24/2017 Negative     Urobilinogen, UA 11/24/2017 4 0*    Bilirubin, UA 11/24/2017 Small*    Blood, UA 11/24/2017 Trace-lysed*    Troponin I 11/24/2017 0 02     NT-proBNP 11/24/2017 232     Ammonia 11/24/2017 <10*    Ventricular Rate 11/26/2017 88     Atrial Rate 11/26/2017 94     CT Interval 11/26/2017 152     QRSD Interval 11/26/2017 62     QT Interval 11/26/2017 334     QTC Interval 11/26/2017 404     P Axis 11/26/2017 83     QRS Axis 11/26/2017 40     T Wave Axis 11/26/2017 56     RBC, UA 11/24/2017 0-1*    WBC, UA 11/24/2017 1-2*    Epithelial Cells 11/24/2017 Occasional     Bacteria, UA 11/24/2017 Occasional     Hyaline Casts, UA 11/24/2017 1-2*    MUCOUS THREADS 11/24/2017 Occasional     Ventricular Rate 11/25/2017 127     Atrial Rate 11/25/2017 127     CT Interval 11/25/2017 162     QRSD Interval 11/25/2017 74     QT Interval 11/25/2017 262     QTC Interval 11/25/2017 380     P Axis 11/25/2017 57     QRS Axis 11/25/2017 35     T Wave Axis 11/25/2017 32     Magnesium 11/25/2017 1 1*    Sodium 11/25/2017 145     Potassium 11/25/2017 2 7*    Chloride 11/25/2017 110*    CO2 11/25/2017 26     Anion Gap 11/25/2017 9     BUN 11/25/2017 10     Creatinine 11/25/2017 0 73     Glucose 11/25/2017 106     Calcium 11/25/2017 9 9     AST 11/25/2017 21     ALT 11/25/2017 15     Alkaline Phosphatase 11/25/2017 45*    Total Protein 11/25/2017 5 8*    Albumin 11/25/2017 2 3*    Total Bilirubin 11/25/2017 0 70     eGFR 11/25/2017 89     WBC 11/25/2017 8 09     RBC 11/25/2017 3 44*    Hemoglobin 11/25/2017 9 2*    Hematocrit 11/25/2017 29 3*    MCV 11/25/2017 85     MCH 11/25/2017 26 7*    MCHC 11/25/2017 31 4     RDW 11/25/2017 13 9     Platelets 56/94/4911 275     MPV 11/25/2017 10 7     Phosphorus 11/25/2017 2 3     Vitamin B-12 11/25/2017 796     Sodium 11/26/2017 141     Potassium 11/26/2017 3 5     Chloride 11/26/2017 107     CO2 11/26/2017 28     Anion Gap 11/26/2017 6     BUN 11/26/2017 7     Creatinine 11/26/2017 0 64     Glucose 11/26/2017 104  Calcium 11/26/2017 9 8     eGFR 11/26/2017 96     Magnesium 11/26/2017 1 7                Results from last 7 days  Lab Units 11/24/17  1316 11/24/17  1314   BLOOD CULTURE  No Growth at 48 hrs  No Growth at 48 hrs

## 2017-11-26 NOTE — PLAN OF CARE
Problem: PHYSICAL THERAPY ADULT  Goal: Performs mobility at highest level of function for planned discharge setting  See evaluation for individualized goals  Treatment/Interventions: Functional transfer training, LE strengthening/ROM, Therapeutic exercise, Endurance training, Patient/family training, Equipment eval/education, Bed mobility, Gait training          See flowsheet documentation for full assessment, interventions and recommendations  Prognosis: Fair  Problem List: Decreased strength, Decreased endurance, Impaired balance, Decreased mobility, Decreased coordination, Decreased cognition, Impaired judgement, Decreased safety awareness  Assessment: Pt is a 80 y o  female seen for PT evaluation s/p admit to [facility] on 11/24/2017 w/ Acute encephalopathy  Order placed for PT  Comorbidities affecting pt's physical performance listed above  Personal factors affecting pt at time of IE include: steps to enter environment, environment, limited home support, advanced age, past experience, behavioral pattern, inability to perform IADLs, inability to perform ADLs, inability to ambulate household distances, limited insight into impairments and recent fall(s)  Prior to admission, pt was independent w/ all functional mobility w/ SPC, lives in multi-level home, has 2 ADRIEL and lives alone  Per chart, pt was recently having falls and increasing difficulty managing at home independently  Upon evaluation: Pt requires max A x2 for bed mobility, max A x2 for sit to stand, and max A x2 for SPT  Presents with Fair (-) static sitting balance at EOB, however unable to extend her head and neck  Difficulty maintaining eyes open without constant stimulation  Responds minimally and mostly mimics questions/phrases  (Please find full objective findings from PT assessment regarding body systems outlined above)   Impairments and limitations also listed above, especially due to  weakness, impaired balance, decreased endurance, gait deviations, pain, decreased activity tolerance, decreased safety awareness, impaired judgement, fall risk and decreased cognition  The following objective measures performed on IE also reveal limitations: Barthel Index 5/100  Pt's clinical presentation is currently unstable/unpredictable seen in pt's presentation of decreased cognition, significant decline in functional mobility compared to baseline, and abnormal lab values  Pt to benefit from continued skilled PT tx while in hospital and upon DC to address deficits as defined above and maximize level of functional mobility  From PT/mobility standpoint, recommendation at time of d/c would be long term rehab and possible long term placement pending progress in order to maximize pt's functional independence and consistency w/ mobility in order to facilitate return to PLOF  Recommend progression of sitting balance, standing tolerance, and SPT training  Recommendation: Long-term skilled PT, Long-term skilled nursing home placement          See flowsheet documentation for full assessment

## 2017-11-26 NOTE — PROGRESS NOTES
Progress Note - Miladis Madera 80 y o  female MRN: [id number]    Unit/Bed#: -01 Encounter: [id number]        * Acute encephalopathy   Assessment & Plan       Most likely related with acute dehydration, electrolyte imbalance  Stable   Keep on IV hydration  Hold Risperidon  Follow psychiatry evaluation        Alzheimer's disease   Assessment & Plan      Acute encephalopathy most likely related with acute and on chronic dementia  Stable  Hold Risperdal for now do a patient is so sleepy  Prevent aspiration        Hypokalemia   Assessment & Plan    Improved        HTN (hypertension), benign   Assessment & Plan      Stable  Continue home med        Leukocytosis   Assessment & Plan       Improved        Hypercalcemia   Assessment & Plan      Improved               VTE Pharmacologic Prophylaxis:   Pharmacologic: Enoxaparin (Lovenox)  Mechanical: Mechanical VTE prophylaxis in place  Patient Centered Rounds: I have performed bedside rounds with nursing staff today  Discussions with Specialists or Other Care Team Provider:   Education and Discussions with Family / Patient:   Time Spent for Care: 20 minutes  More than 50% of total time spent on counseling and coordination of care as described above  Current Length of Stay: 2 day(s)  Current Patient Status: Inpatient   Certification Statement: The patient will continue to require additional inpatient hospital stay due to Acute encephalopathy    Discharge Plan: depend on clinical course  Code Status: Level 1 - Full Code    Subjective:   Patient does not follow up command, she is sleepy but awake with stimulation, no acute distress  Objective:   Vitals:   Temp (24hrs), Av 5 °F (36 9 °C), Min:98 4 °F (36 9 °C), Max:98 5 °F (36 9 °C)    HR:  [] 90  Resp:  [18-24] 20  BP: (152-185)/(60-83) 168/73  SpO2:  [95 %-97 %] 97 %  Body mass index is 23 05 kg/m²  Input and Output Summary (last 24 hours):        Intake/Output Summary (Last 24 hours) at 11/26/17 1149  Last data filed at 11/26/17 0500   Gross per 24 hour   Intake              145 ml   Output              205 ml   Net              -60 ml       Physical Exam:     Physical Exam   Constitutional: No distress  Neck: No JVD present  No tracheal deviation present  No thyromegaly present  Cardiovascular: Normal rate and normal heart sounds  Exam reveals no gallop and no friction rub  No murmur heard  Pulmonary/Chest: Effort normal  No respiratory distress  She has no wheezes  She has no rales  She exhibits no tenderness  Abdominal: Soft  She exhibits no distension and no mass  There is no tenderness  There is no rebound and no guarding  Musculoskeletal: She exhibits no edema, tenderness or deformity  Lymphadenopathy:     She has no cervical adenopathy  Neurological: She displays normal reflexes  No cranial nerve deficit  She exhibits normal muscle tone  Coordination normal    Skin: Skin is warm  She is not diaphoretic  Additional Data:   Labs:    Results from last 7 days  Lab Units 11/25/17  0508 11/24/17  1314   WBC Thousand/uL 8 09 11 75*   HEMOGLOBIN g/dL 9 2* 11 5   HEMATOCRIT % 29 3* 35 5   PLATELETS Thousands/uL 275 316   NEUTROS PCT %  --  79*   LYMPHS PCT %  --  9*   MONOS PCT %  --  12   EOS PCT %  --  0       Results from last 7 days  Lab Units 11/26/17  0609 11/25/17  0508   SODIUM mmol/L 141 145   POTASSIUM mmol/L 3 5 2 7*   CHLORIDE mmol/L 107 110*   CO2 mmol/L 28 26   BUN mg/dL 7 10   CREATININE mg/dL 0 64 0 73   CALCIUM mg/dL 9 8 9 9   TOTAL PROTEIN g/dL  --  5 8*   BILIRUBIN TOTAL mg/dL  --  0 70   ALK PHOS U/L  --  45*   ALT U/L  --  15   AST U/L  --  21   GLUCOSE RANDOM mg/dL 104 106       Results from last 7 days  Lab Units 11/24/17  1314   INR  0 98       * I Have Reviewed All Lab Data Listed Above  * Additional Pertinent Lab Tests Reviewed:  All Labs Within Last 24 Hours Reviewed    Imaging:    Imaging Reports Reviewed Today Include:   Cultures:   Blood Culture: Lab Results   Component Value Date    BLOODCX No Growth at 24 hrs  11/24/2017    BLOODCX No Growth at 24 hrs  11/24/2017     Urine Culture: No results found for: URINECX  Sputum Culture: No components found for: SPUTUMCX  Wound Culture: No results found for: WOUNDCULT    Last 24 Hours Medication List:     atorvastatin 20 mg Oral QPM   docusate sodium 100 mg Oral BID   enoxaparin 40 mg Subcutaneous Daily   losartan 100 mg Oral Daily   metoprolol tartrate 25 mg Oral Q12H [address]   risperiDONE 1 mg Oral BID        Today, Patient Was Seen By: Liliane Faith MD    ** Please Note: Dragon 360 Dictation voice to text software may have been used in the creation of this document   **
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
## 2017-11-26 NOTE — ASSESSMENT & PLAN NOTE
Acute encephalopathy most likely related with acute and on chronic dementia  Stable  Hold Risperdal for now do a patient is so sleepy  Prevent aspiration  Speech and swallowing evaluation

## 2017-11-26 NOTE — PLAN OF CARE
Problem: Potential for Falls  Goal: Patient will remain free of falls  INTERVENTIONS:  - Assess patient frequently for physical needs  -  Identify cognitive and physical deficits and behaviors that affect risk of falls    -  [place] fall precautions as indicated by assessment   - Educate patient/family on patient safety including physical limitations  - Instruct patient to call for assistance with activity based on assessment  - Modify environment to reduce risk of injury  - Consider OT/PT consult to assist with strengthening/mobility   Outcome: Progressing      Problem: Prexisting or High Potential for Compromised Skin Integrity  Goal: Skin integrity is maintained or improved  INTERVENTIONS:  - Identify patients at risk for skin breakdown  - Assess and monitor skin integrity  - Assess and monitor nutrition and hydration status  - Monitor labs (i e  albumin)  - Assess for incontinence   - Turn and reposition patient  - Assist with mobility/ambulation  - Relieve pressure over bony prominences  - Avoid friction and shearing  - Provide appropriate hygiene as needed including keeping skin clean and dry  - Evaluate need for skin moisturizer/barrier cream  - Collaborate with interdisciplinary team (i e  Nutrition, Rehabilitation, etc )   - Patient/family teaching   Outcome: Progressing      Problem: SAFETY,RESTRAINT: NV/NON-SELF DESTRUCTIVE BEHAVIOR  Goal: Remains free of harm/injury (restraint for non violent/non self-detsructive behavior)  INTERVENTIONS:  - Instruct patient/family regarding restraint use   - Assess and monitor physiologic and psychological status   - Provide interventions and comfort measures to meet assessed patient needs   - Identify and implement measures to help patient regain control  - Assess readiness for release of restraint    Outcome: Progressing    Goal: Returns to optimal restraint-free functioning  INTERVENTIONS:  - Assess the patient's behavior and symptoms that indicate continued need for restraint  - Identify and implement measures to help patient regain control  - Assess readiness for release of restraint    Outcome: Progressing      Problem: Nutrition/Hydration-ADULT  Goal: Nutrient/Hydration intake appropriate for improving, restoring or maintaining nutritional needs  Monitor and assess patient's nutrition/hydration status for malnutrition (ex- brittle hair, bruises, dry skin, pale skin and conjunctiva, muscle wasting, smooth red tongue, and disorientation)  Collaborate with interdisciplinary team and initiate plan and interventions as ordered  Monitor patient's weight and dietary intake as ordered or per policy  Utilize nutrition screening tool and intervene per policy  Determine patient's food preferences and provide high-protein, high-caloric foods as appropriate       INTERVENTIONS:  - Monitor oral intake, urinary output, labs, and treatment plans  - Assess nutrition and hydration status and recommend course of action  - Evaluate amount of meals eaten  - Assist patient with eating if necessary   - Allow adequate time for meals  - Recommend/ encourage appropriate diets, oral nutritional supplements, and vitamin/mineral supplements  - Order, calculate, and assess calorie counts as needed  - Recommend, monitor, and adjust tube feedings and TPN/PPN based on assessed needs  - Assess need for intravenous fluids  - Provide specific nutrition/hydration education as appropriate  - Include patient/family/caregiver in decisions related to nutrition   Outcome: Progressing      Problem: DISCHARGE PLANNING - CARE MANAGEMENT  Goal: Discharge to post-acute care or home with appropriate resources  INTERVENTIONS:  - Conduct assessment to determine patient/family and health care team treatment goals, and need for post-acute services based on payer coverage, community resources, and patient preferences, and barriers to discharge  - Address psychosocial, clinical, and financial barriers to discharge as identified in assessment in conjunction with the patient/family and health care team  - Arrange appropriate level of post-acute services according to patients   needs and preference and payer coverage in collaboration with the physician and health care team  - Communicate with and update the patient/family, physician, and health care team regarding progress on the discharge plan  - Arrange appropriate transportation to post-acute venues   Outcome: Progressing

## 2017-11-26 NOTE — PHYSICAL THERAPY NOTE
PHYSICAL THERAPY EVALUATION  NAME: Miladis Madera  AGE:   80 y o  MRN:  [id number]  ADMIT DX: Hypercalcemia [E83 52]  Delirium [R41 0]  Symptoms of cerebrovascular accident (CVA) [R29 90]    PMH:   Past Medical History:   Diagnosis Date    Anxiety     Depression     HLD (hyperlipidemia) 11/24/2017    HTN (hypertension), benign 11/24/2017    Peripheral edema 11/24/2017    Psychiatric illness     Psychosis      LENGTH OF STAY: 2     11/26/17 0931   Pain Assessment   Pain Assessment FLACC   Pain Rating: FLACC (Rest) - Face 0   Pain Rating: FLACC (Rest) - Legs 0   Pain Rating: FLACC (Rest) - Activity 0   Pain Rating: FLACC (Rest) - Cry 0   Pain Rating: FLACC (Rest) - Consolability 0   Score: FLACC (Rest) 0   Pain Rating: FLACC (Activity) - Face 0   Pain Rating: FLACC (Activity) - Legs 1   Pain Rating: FLACC (Activity) - Activity 0   Pain Rating: FLACC (Activity) - Cry 1   Pain Rating: FLACC (Activity) - Consolability 0   Score: FLACC (Activity) 2   Home Living   Type of Home House   Home Layout Two level;Stairs to enter with rails  (2 ADRIEL)   Home Equipment Cane   Additional Comments Per chart, pt was ambulating independently with SPC at baseline  Chart reports pt had multiple falls recently and has had increasing difficulty managing self care and home care  Prior Function   Level of [place] Independent with ADLs and functional mobility   Lives With Alone   Receives Help From Family  (brother visits daily; family brings food)   ADL Assistance Independent   IADLs Independent   Falls in the last 6 months 1 to 4   Restrictions/Precautions   Weight Bearing Precautions Per Order No   Other Precautions Cognitive; Chair Alarm; Bed Alarm;1:1;Fall Risk;Pain   General   Family/Caregiver Present No   Cognition   Overall Cognitive Status Impaired   Arousal/Participation Lethargic   Orientation Level Oriented to person;Disoriented to place; Disoriented to time;Disoriented to situation   Memory Unable to assess Following Commands Follows one step commands inconsistently   RLE Assessment   RLE Assessment X   Strength RLE   RLE Overall Strength 2+/5  (grossly)   LLE Assessment   LLE Assessment X   Strength LLE   LLE Overall Strength 2+/5  (grossly)   Bed Mobility   Supine to Sit 2  Maximal assistance   Additional items HOB elevated; Bedrails; Increased time required;Verbal cues;LE management;Assist x 2   Sit to Supine Unable to assess  (left OOB in chair post evaluation with 1:1 present)   Transfers   Sit to Stand 2  Maximal assistance   Additional items Assist x 2; Increased time required;Verbal cues   Stand to Sit 2  Maximal assistance   Additional items Assist x 2; Increased time required;Verbal cues   Stand pivot 2  Maximal assistance   Additional items Assist x 2; Increased time required;Verbal cues   Ambulation/Elevation   Gait pattern Not appropriate  (due to mental status and LE weakness)   Balance   Static Sitting Fair -  (decreased ability to extend head/neck in sitting)   Dynamic Sitting Poor   Static Standing Poor -   Dynamic Standing Poor -   Endurance Deficit   Endurance Deficit Yes   Endurance Deficit Description limited sitting/standing tolerance   Activity Tolerance   Activity Tolerance Patient limited by fatigue  (mental status)   Medical Staff Made Aware WENDY Mcclure present and assisting with transfer   Nurse Made Aware Per RN Nabil Mcnulty, pt appropriate to evaluate   Assessment   Prognosis Fair   Problem List Decreased strength;Decreased endurance; Impaired balance;Decreased mobility; Decreased coordination;Decreased cognition; Impaired judgement;Decreased safety awareness   Goals   Patient Goals unable to state   STG Expiration Date 12/05/17   Short Term Goal #1 Pt will be able to: (1) perform bed mobility with mod A x1 (2) perform sit to stand with mod A x1 (3) perform SPT with mod A x1 (4) PT to see for ambulation assessment as appropriate (5) initiate HEP    Treatment Day 0   Plan   Treatment/Interventions Functional transfer training;LE strengthening/ROM; Therapeutic exercise; Endurance training;Patient/family training;Equipment eval/education; Bed mobility;Gait training   PT Frequency 2-3x/wk   Recommendation   Recommendation Long-term skilled PT;Long-term skilled nursing home placement   Barthel Index   Feeding 0   Bathing 0   Grooming Score 0   Dressing Score 0   Bladder Score 0   Bowels Score 0   Toilet Use Score 0   Transfers (Bed/Chair) Score 5   Mobility (Level Surface) Score 0   Stairs Score 0   Barthel Index Score 5       Assessment: Pt is a 80 y o  female seen for PT evaluation s/p admit to [facility] on 11/24/2017 w/ Acute encephalopathy  Order placed for PT  Comorbidities affecting pt's physical performance listed above  Personal factors affecting pt at time of IE include: steps to enter environment, environment, limited home support, advanced age, past experience, behavioral pattern, inability to perform IADLs, inability to perform ADLs, inability to ambulate household distances, limited insight into impairments and recent fall(s)  Prior to admission, pt was independent w/ all functional mobility w/ SPC, lives in multi-level home, has 2 ADRIEL and lives alone  Per chart, pt was recently having falls and increasing difficulty managing at home independently  Upon evaluation: Pt requires max A x2 for bed mobility, max A x2 for sit to stand, and max A x2 for SPT  Presents with Fair (-) static sitting balance at EOB, however unable to extend her head and neck  Difficulty maintaining eyes open without constant stimulation  Responds minimally and mostly mimics questions/phrases  (Please find full objective findings from PT assessment regarding body systems outlined above)   Impairments and limitations also listed above, especially due to  weakness, impaired balance, decreased endurance, gait deviations, pain, decreased activity tolerance, decreased safety awareness, impaired judgement, fall risk and decreased cognition  The following objective measures performed on IE also reveal limitations: Barthel Index 5/100  Pt's clinical presentation is currently unstable/unpredictable seen in pt's presentation of decreased cognition, significant decline in functional mobility compared to baseline, and abnormal lab values  Pt to benefit from continued skilled PT tx while in hospital and upon DC to address deficits as defined above and maximize level of functional mobility  From PT/mobility standpoint, recommendation at time of d/c would be long term rehab and possible long term placement pending progress in order to maximize pt's functional independence and consistency w/ mobility in order to facilitate return to PLOF  Recommend progression of sitting balance, standing tolerance, and SPT training        Lee Castillo, PT,DPT

## 2017-11-26 NOTE — ASSESSMENT & PLAN NOTE
Most likely related with acute dehydration, electrolyte imbalance    Stable   Keep on IV hydration  Hold Risperidon  Follow psychiatry evaluation

## 2017-11-26 NOTE — SPEECH THERAPY NOTE
Speech Language/Pathology  Speech/Language Pathology  Assessment    Patient Name: Saúl Anton  XWTLP'H Date: 11/26/2017     Problem List  Patient Active Problem List   Diagnosis    Psychosis    Acute encephalopathy    Hypercalcemia    Leukocytosis    HTN (hypertension), benign    Peripheral edema    HLD (hyperlipidemia)    Hyperlipidemia    Hypokalemia    Alzheimer's disease    Toenail deformity     Past Medical History  Past Medical History:   Diagnosis Date    Anxiety     Depression     HLD (hyperlipidemia) 11/24/2017    HTN (hypertension), benign 11/24/2017    Peripheral edema 11/24/2017    Psychiatric illness     Psychosis      Past Surgical History  Past Surgical History:   Procedure Laterality Date    APPENDECTOMY      CEREBRAL ANEURYSM REPAIR      HYSTERECTOMY          11/26/17 1520   Patient Information   Current Medical Saúl Anton is a 80 y o  female who presents with an unwitnessed fall  The patient has past medical history of bilateral lower extremity edema, hypertension, hyperlipidemia, history of auditory and visual hallucinations, vitamin B12 deficiency, osteoporosis, hypercalcemia  The patient recently was hospitalized in March of 2017 secondary to increased paranoia, auditory and visual hallucinations  She was hospitalized in the inpatient psychiatric facility  She was noted to stay there for 8 days prior to discharge  She had been non compliant with her home medications which they felt was increasing her paranoia        ER notes that the patient lives with her brother who is 80years old; however I discussed with the patients daughter and brother  She resides in her own home and the brother visits her daily for around 1 hr per day  He brings her food  She is responsible for administering her own medications and they have no way of knowing if the patient is compliant with meds   The patient's niece was also present initially in the ER and raised concerns that the patient lives in a hoarding situation to the ER physician- showing pictures of the patients home with multiple piles of clothes, etc  The patients mental status was noted to significantly worsen over the past two weeks prompting the family to bring her in today  She left the gas stove on last week, fell once yesterday with unknown time down and one time last week as well  She was also telling her brother she moved chairs at home because her brother was there, who had passed away several years ago  Two weeks ago, the family report the patient could hold a full conversation and while confused at times, was never as bad as she is today  They report when they bring her food it is gone the next day       Special Studies WBC-WNL; CXR-NAD; CT head-No acute changes   Past Medical History Advanced Alzheimer's Disease, HTN   Social/Educational/Vocational Hx Lives at home with an 81 y/o brother  Swallow Information   Current Risks for Dysphagia & Aspiration Mental status change;Cognitive deficit;HX neurologic dx;Reduced alertnes   Current Symptoms/Concerns Difficulty chewing;Holding food in mouth  RN reports pt held pills in her mouth, and would not swallow; family reports they gave her broccoli at lunch, and she orally manipulated it, but did not chew or swallow it  Current Diet Regular; Thin liquid   Baseline Diet Regular; Thin liquids   Baseline Assessment   Behavior/Cognition Lethargic;Doesn't follow directions   Speech/Language Status No volitional speech, unable to elicit any speech or voicing   Patient Positioning Upright in bed  (Head hanging down, pt unable to raise head for more than a few seconds at a time)   Swallow Mechanism Exam   Oral mech (Pt unable to complete oral mech exam, not following commands, jaw is slack, pt is drooling)   Dentition Dentures top;Dentures bottom; Poor dental/oral hygiene; Other (Comment)  (Daughter asked that dentures be removed, as they have not been cleaned in some time; dentures were difficult to remove, and were foul smelling  Oral care completed after denture removal; some bleeding noted; RN notified)   Volitional Cough (Unable to elicit)   Tracheostomy No   Consistencies Assessed and Performance   Materials Admnistered Honey thick liquid (spoon dipped in/coated with HTL)  (ice chip)   Oral Stage Absent   Oral Stage Comment Mouth was in an open posture most of the session, with notable drooling from L side, and pooling of saliva in anterior sulcus  Pt made no attempt to manipulate bolus or ice chip when presented to the oral cavity; bolus/ice chip fell/spilled out the mouth anteriorly  Poor bolus retrieval and lip seal as well  Phargngeal Stage Unable to test   Pharyngeal Stage Comment Unable to assess, as oral phase was absent, with bolus/ice chip falling anteriorly out of mouth, never reaching the pharynx  Swallow Mechanics Aspiration risk   Esophageal Concerns No s/s reported   Strategies and Efficacy Pt did not respond to multiple verbal, sensory and tactile cues to close her mouth and try to swallow  Summary   Swallow Summary Evaluation was very limited, as pt participation was limited, due to lethargy/weakness  Oral prep/oral phase of the swallow is severly impaired, essentially absent, with no attempts by pt to retrieve bolus, close lips, or manipulate the bolus  The ice chip and HTL fell anteriorly from mouth, with no attempts to swallow  Unable to assess pharyngeal swallow, as no portion of the bolus reached the pharynx  Pt is at very high risk to aspirate and high risk for malnutrition/dehydration at this time  Recommendations   Risk for Aspiration Severe   Recommendations NPO; Continue swallow eval process   Recommended Form of Meds Feeding tube/IV   General Precautions Aspiration precautions   Further Evaluations Dietician   Results Reviewed with PT/Family/Caregiver;RN;MD  (All verbalized understanding, except pt who is not alert enough to comprehend)   Treatment Recommendations   Duration of treatment 1 week   Follow up treatments Continue clinical assessment;Patient/family education   Dysphagia Goals (Pt will tolerate at least consistencies of food/liquid without s/s of aspiration across meals)   Speech Therapy Prognosis   Prognosis Guarded   Prognosis Considerations Co-Morbidities; Patient Participation Level;Previous Level of Function;Severity of Impairments;Potential

## 2017-11-26 NOTE — SUBJECTIVE & OBJECTIVE
VTE Pharmacologic Prophylaxis:   Pharmacologic: Enoxaparin (Lovenox)  Mechanical: Mechanical VTE prophylaxis in place  Patient Centered Rounds: I have performed bedside rounds with nursing staff today  Discussions with Specialists or Other Care Team Provider:   Education and Discussions with Family / Patient:   Time Spent for Care: 20 minutes  More than 50% of total time spent on counseling and coordination of care as described above  Current Length of Stay: 2 day(s)  Current Patient Status: Inpatient   Certification Statement: The patient will continue to require additional inpatient hospital stay due to Acute encephalopathy    Discharge Plan: depend on clinical course  Code Status: Level 1 - Full Code    Subjective:   Patient does not follow up command, she is sleepy but awake with stimulation, no acute distress  Objective:   Vitals:   Temp (24hrs), Av 5 °F (36 9 °C), Min:98 4 °F (36 9 °C), Max:98 5 °F (36 9 °C)    HR:  [] 90  Resp:  [18-24] 20  BP: (152-185)/(60-83) 168/73  SpO2:  [95 %-97 %] 97 %  Body mass index is 23 05 kg/m²  Input and Output Summary (last 24 hours): Intake/Output Summary (Last 24 hours) at 17 1149  Last data filed at 17 0500   Gross per 24 hour   Intake              145 ml   Output              205 ml   Net              -60 ml       Physical Exam:     Physical Exam   Constitutional: No distress  Neck: No JVD present  No tracheal deviation present  No thyromegaly present  Cardiovascular: Normal rate and normal heart sounds  Exam reveals no gallop and no friction rub  No murmur heard  Pulmonary/Chest: Effort normal  No respiratory distress  She has no wheezes  She has no rales  She exhibits no tenderness  Abdominal: Soft  She exhibits no distension and no mass  There is no tenderness  There is no rebound and no guarding  Musculoskeletal: She exhibits no edema, tenderness or deformity     Lymphadenopathy:     She has no cervical adenopathy  Neurological: She displays normal reflexes  No cranial nerve deficit  She exhibits normal muscle tone  Coordination normal    Skin: Skin is warm  She is not diaphoretic  Additional Data:   Labs:    Results from last 7 days  Lab Units 11/25/17  0508 11/24/17  1314   WBC Thousand/uL 8 09 11 75*   HEMOGLOBIN g/dL 9 2* 11 5   HEMATOCRIT % 29 3* 35 5   PLATELETS Thousands/uL 275 316   NEUTROS PCT %  --  79*   LYMPHS PCT %  --  9*   MONOS PCT %  --  12   EOS PCT %  --  0       Results from last 7 days  Lab Units 11/26/17  0609 11/25/17  0508   SODIUM mmol/L 141 145   POTASSIUM mmol/L 3 5 2 7*   CHLORIDE mmol/L 107 110*   CO2 mmol/L 28 26   BUN mg/dL 7 10   CREATININE mg/dL 0 64 0 73   CALCIUM mg/dL 9 8 9 9   TOTAL PROTEIN g/dL  --  5 8*   BILIRUBIN TOTAL mg/dL  --  0 70   ALK PHOS U/L  --  45*   ALT U/L  --  15   AST U/L  --  21   GLUCOSE RANDOM mg/dL 104 106       Results from last 7 days  Lab Units 11/24/17  1314   INR  0 98       * I Have Reviewed All Lab Data Listed Above  * Additional Pertinent Lab Tests Reviewed: All Labs Within Last 24 Hours Reviewed    Imaging:    Imaging Reports Reviewed Today Include:   Cultures:   Blood Culture:   Lab Results   Component Value Date    BLOODCX No Growth at 24 hrs  11/24/2017    BLOODCX No Growth at 24 hrs  11/24/2017     Urine Culture: No results found for: URINECX  Sputum Culture: No components found for: SPUTUMCX  Wound Culture: No results found for: WOUNDCULT    Last 24 Hours Medication List:     atorvastatin 20 mg Oral QPM   docusate sodium 100 mg Oral BID   enoxaparin 40 mg Subcutaneous Daily   losartan 100 mg Oral Daily   metoprolol tartrate 25 mg Oral Q12H [address]   risperiDONE 1 mg Oral BID        Today, Patient Was Seen By: Alen Favre, MD    ** Please Note: Dragon 360 Dictation voice to text software may have been used in the creation of this document   **

## 2017-11-27 PROCEDURE — 92526 ORAL FUNCTION THERAPY: CPT

## 2017-11-27 RX ADMIN — METOPROLOL TARTRATE 25 MG: 25 TABLET, FILM COATED ORAL at 09:04

## 2017-11-27 RX ADMIN — LOSARTAN POTASSIUM 100 MG: 50 TABLET ORAL at 09:04

## 2017-11-27 RX ADMIN — DOCUSATE SODIUM 100 MG: 100 CAPSULE, LIQUID FILLED ORAL at 09:04

## 2017-11-27 RX ADMIN — ENOXAPARIN SODIUM 40 MG: 40 INJECTION SUBCUTANEOUS at 09:05

## 2017-11-27 NOTE — SUBJECTIVE & OBJECTIVE
VTE Pharmacologic Prophylaxis:   Pharmacologic: Enoxaparin (Lovenox)  Mechanical: Mechanical VTE prophylaxis in place  Patient Centered Rounds: I have performed bedside rounds with nursing staff today  Discussions with Specialists or Other Care Team Provider:   Education and Discussions with Family / Patient:   Time Spent for Care: 20 minutes  More than 50% of total time spent on counseling and coordination of care as described above  Current Length of Stay: 3 day(s)  Current Patient Status: Inpatient   Certification Statement: The patient will continue to require additional inpatient hospital stay due to acute encephalophaty     Discharge Plan: depend on clinical course  Code Status: Level 1 - Full Code    Subjective:   Patient found awake, follow up some command like squeeze my finger  She reported being fine    Objective:   Vitals:   Temp (24hrs), Av 5 °F (37 5 °C), Min:98 9 °F (37 2 °C), Max:100 1 °F (37 8 °C)    HR:  [88-93] 90  Resp:  [18-24] 18  BP: (130-169)/(65-90) 130/90  SpO2:  [98 %] 98 %  Body mass index is 23 05 kg/m²  Input and Output Summary (last 24 hours): Intake/Output Summary (Last 24 hours) at 17 1127  Last data filed at 17 0100   Gross per 24 hour   Intake                0 ml   Output              168 ml   Net             -168 ml       Physical Exam:     Physical Exam   Constitutional: No distress  Neck: Normal range of motion  Neck supple  No JVD present  No tracheal deviation present  No thyromegaly present  Cardiovascular: Normal rate, regular rhythm and normal heart sounds  Exam reveals no gallop  No murmur heard  Pulmonary/Chest: Effort normal and breath sounds normal  No respiratory distress  She has no wheezes  She has no rales  She exhibits no tenderness  Abdominal: Soft  Bowel sounds are normal  She exhibits no distension and no mass  There is no tenderness  There is no rebound and no guarding     Lymphadenopathy:     She has no cervical adenopathy  Neurological: She is alert  She displays normal reflexes  No cranial nerve deficit  Coordination normal    Awake, follow some command   Skin: Skin is warm  She is not diaphoretic  Psychiatric: She has a normal mood and affect  Additional Data:   Labs:    Results from last 7 days  Lab Units 11/25/17  0508 11/24/17  1314   WBC Thousand/uL 8 09 11 75*   HEMOGLOBIN g/dL 9 2* 11 5   HEMATOCRIT % 29 3* 35 5   PLATELETS Thousands/uL 275 316   NEUTROS PCT %  --  79*   LYMPHS PCT %  --  9*   MONOS PCT %  --  12   EOS PCT %  --  0       Results from last 7 days  Lab Units 11/26/17  0609 11/25/17  0508   SODIUM mmol/L 141 145   POTASSIUM mmol/L 3 5 2 7*   CHLORIDE mmol/L 107 110*   CO2 mmol/L 28 26   BUN mg/dL 7 10   CREATININE mg/dL 0 64 0 73   CALCIUM mg/dL 9 8 9 9   TOTAL PROTEIN g/dL  --  5 8*   BILIRUBIN TOTAL mg/dL  --  0 70   ALK PHOS U/L  --  45*   ALT U/L  --  15   AST U/L  --  21   GLUCOSE RANDOM mg/dL 104 106       Results from last 7 days  Lab Units 11/24/17  1314   INR  0 98       * I Have Reviewed All Lab Data Listed Above  * Additional Pertinent Lab Tests Reviewed: All Labs Within Last 24 Hours Reviewed    Imaging:    Imaging Reports Reviewed Today Include:  Chest x-ray no active pulmonary disease  Head CT:No acute intracranial hemorrhage seen  No CT signs of acute territorial infarction  Stable CT  Cultures:   Blood Culture:   Lab Results   Component Value Date    BLOODCX No Growth at 48 hrs  11/24/2017    BLOODCX No Growth at 48 hrs   11/24/2017     Urine Culture: No results found for: URINECX  Sputum Culture: No components found for: SPUTUMCX  Wound Culture: No results found for: WOUNDCULT    Last 24 Hours Medication List:     atorvastatin 20 mg Oral QPM   docusate sodium 100 mg Oral BID   enoxaparin 40 mg Subcutaneous Daily   losartan 100 mg Oral Daily   metoprolol tartrate 25 mg Oral Q12H [facility]        Today, Patient Was Seen By: Steph Albarado MD    ** Please Note: Dragon 360 Dictation voice to text software may have been used in the creation of this document   **

## 2017-11-27 NOTE — ASSESSMENT & PLAN NOTE
Patient returning to her baseline it was most likely related with acute and on chronic dementia  Stable  Continue to Hold Risperdal   Prevent aspiration  Advance to thick diet

## 2017-11-27 NOTE — PLAN OF CARE
Problem: SLP ADULT - SWALLOWING, IMPAIRED  Goal: Initial SLP swallow eval performed  Outcome: Completed Date Met: 11/27/17  Begin puree w/ thin lqinahun by straw

## 2017-11-27 NOTE — PLAN OF CARE
Problem: Nutrition/Hydration-ADULT  Goal: Nutrient/Hydration intake appropriate for improving, restoring or maintaining nutritional needs  Monitor and assess patient's nutrition/hydration status for malnutrition (ex- brittle hair, bruises, dry skin, pale skin and conjunctiva, muscle wasting, smooth red tongue, and disorientation)  Collaborate with interdisciplinary team and initiate plan and interventions as ordered  Monitor patient's weight and dietary intake as ordered or per policy  Utilize nutrition screening tool and intervene per policy  Determine patient's food preferences and provide high-protein, high-caloric foods as appropriate  INTERVENTIONS:  - Monitor oral intake, urinary output, labs, and treatment plans  - Assess nutrition and hydration status and recommend course of action  - Evaluate amount of meals eaten  - Assist patient with eating if necessary   - Allow adequate time for meals  - Recommend/ encourage appropriate diets, oral nutritional supplements, and vitamin/mineral supplements  - Order, calculate, and assess calorie counts as needed  - Recommend, monitor, and adjust tube feedings and TPN/PPN based on assessed needs  - Assess need for intravenous fluids  - Provide specific nutrition/hydration education as appropriate  - Include patient/family/caregiver in decisions related to nutrition   Outcome: Progressing  Diet just advanced to dysphagia level 1 pureed w/ thin liquids  Will monitor intake on this diet

## 2017-11-27 NOTE — WOUND OSTOMY CARE
Progress Note - Wound   Jennie Mcgregor 80 y o  female MRN: [id number]  Unit/Bed#: -01 Encounter: [id number]      Assessment:  Patient is 80year old female who presents with falls  Admitted with acute encephalopathy  Patient has a history of - edema, HTN, HLD, auditory/visual hallucinations  Wound care to see patient for nursing documentation of pressure injuries on admission  Patient seen in bed, assist of 1-2 with turning, lethargy, nutrition is following along with this patient  Patient incontinent of bowel and bladder  Incontinent of urine at time of assessment  Care rendered at time of assessment  Patient is dependent for care  B/l heels are intact with no redness, dry skin noted  Recommend hydraguard and offloading with pillows  L buttock / ischial area with stage 2 pressure injury  This wound was noted on admission  Wound bed is 100% pink  Edges are intact dry attached with no maceration noted  Scant bloody drainage noted  Marely-wound is intact with no redness, temperature changes, induration or fluctuance noted  Recommend calazime paste to wound and hydraguard to marely-wound and buttocks, as patient is incontinent of bowel and bladder  Generalized skin is flaky and dry - recommend skin nourishing cream     Primary nurse at bedside at time of assessment  Wound care will follow along weekly  See flow sheets for more detailed assessment findings  Plan:   1  Cleanse stage 2 pressure injury to L buttock with soap and water, pat dry, apply calazime to wound bed and apply hydraguard to b/l buttocks and marely-wound TID and PRN   2  Apply hydraguard to b/l heels BID and PRN for prevention and protection  3  Apply skin nourishing cream to the entire skin daily for moisture   4  Turn and reposition patient every 2 hours   5  Soft care cushion to chair when OOB, if able   6  Elevate heels off of bed with pillows to offload    7  Wound care will follow along with patient weekly, please call with questions and concerns [phone]         Objective:    Vitals: Blood pressure 165/73, pulse 84, temperature 99 1 °F (37 3 °C), temperature source Oral, resp  rate 20, height 5' 4" (1 626 m), weight 60 9 kg (134 lb 4 2 oz), SpO2 98 %  ,Body mass index is 23 05 kg/m²  Pressure Ulcer 11/24/17 Buttocks Left; Lower stage II pressure ulcer (Active)   Staging Stage II 11/27/2017 11:00 AM   Wound Description Pink 11/27/2017 11:00 AM   Marely-wound Assessment Clean;Dry; Intact 11/27/2017 11:00 AM   Wound Length (cm) 2 4 cm 11/27/2017 11:00 AM   Wound Width (cm) 4 6 cm 11/27/2017 11:00 AM   Wound Depth (cm) 0 1 11/27/2017 11:00 AM   Calculated Wound Area (cm^2) 11 04 cm^2 11/27/2017 11:00 AM   Calculated Wound Volume (cm^3) 1 1 cm^3 11/27/2017 11:00 AM   Drainage Amount Scant 11/27/2017 11:00 AM   Drainage Description Bloody 11/27/2017 11:00 AM   Treatment Cleansed;Elevated with pillow(s); Offload;Softcare cushion;Turn & reposition 11/27/2017 11:00 AM   Dressing Protective barrier 11/27/2017 11:00 AM   Wound packed? No 11/27/2017 11:00 AM   Packing- # removed 0 11/27/2017 11:00 AM   Packing- # inserted 0 11/27/2017 11:00 AM   Patient Tolerance Tolerated well 11/27/2017 11:00 AM   Dressing Status Open to air 11/27/2017 11:00 AM       Recommendations written as orders    Santos Goldstein RN BSN

## 2017-11-27 NOTE — SPEECH THERAPY NOTE
Speech Language/Pathology                             SLP  Note  Patient Name: Artis Live  XWHNC'T Date: 11/27/2017       Subjective:  Pt seen for dysphagia tx  Pt seated upright in bed  Answering some questions w/ vague responses  Dentures not placed for po assessment   Objective:  Pt took 4 oz of puree by tsp, drank NTL by cup and straw, and also thin liquids by straw  Pt w/ slow, but adequate manipulation of puree  Good oral control of liquids  Pt tends to hold a few seconds prior to transfer  Swallows appeared timely and complete  Cough on initial straw sip of thin, but no further s/s aspiration noted for 2 oz  Assessment:  Pt more awake and alert- able to take puree and some thin liquids  Plan/Recommendations:  rec advance diet to puree w/ thin liquids  Feed when awake and alert    meds in puree  Will cont to follow

## 2017-11-27 NOTE — ASSESSMENT & PLAN NOTE
Improving, it was likely related with acute dehydration, electrolyte imbalance    Stable   Keep on IV hydration  Hold Risperidon  Follow psychiatry evaluation

## 2017-11-27 NOTE — PROGRESS NOTES
Progress Note - Josh Centeno 80 y o  female MRN: [id number]    Unit/Bed#: -01 Encounter: [id number]        * Acute encephalopathy   Assessment & Plan    Improving, it was likely related with acute dehydration, electrolyte imbalance  Stable   Hold Risperidon  Follow psychiatry evaluation        Toenail deformity   Assessment & Plan    Improved after podiatric evaluation        Alzheimer's disease   Assessment & Plan    Patient returning to her baseline it was most likely related with acute and on chronic dementia  Stable  Continue to Hold Risperdal   Prevent aspiration  Advance to thick diet         Hypokalemia   Assessment & Plan    Improved        HLD (hyperlipidemia)   Assessment & Plan    Cont Statin        Peripheral edema   Assessment & Plan    Chronic  Likely secondary to venous insufficiency        HTN (hypertension), benign   Assessment & Plan      Stable  Continue home med        Leukocytosis   Assessment & Plan    Improved        Hypercalcemia   Assessment & Plan      Improved               VTE Pharmacologic Prophylaxis:   Pharmacologic: Enoxaparin (Lovenox)  Mechanical: Mechanical VTE prophylaxis in place  Patient Centered Rounds: I have performed bedside rounds with nursing staff today  Discussions with Specialists or Other Care Team Provider:   Education and Discussions with Family / Patient:   Time Spent for Care: 20 minutes  More than 50% of total time spent on counseling and coordination of care as described above  Current Length of Stay: 3 day(s)  Current Patient Status: Inpatient   Certification Statement: The patient will continue to require additional inpatient hospital stay due to acute encephalophaty     Discharge Plan: depend on clinical course  Code Status: Level 1 - Full Code    Subjective:   Patient found awake, follow up some command like squeeze my finger   She reported being fine    Objective:   Vitals:   Temp (24hrs), Av 5 °F (37 5 °C), Min:98 9 °F (37 2 °C), Max:100 1 °F (37 8 °C)    HR:  [88-93] 90  Resp:  [18-24] 18  BP: (130-169)/(65-90) 130/90  SpO2:  [98 %] 98 %  Body mass index is 23 05 kg/m²  Input and Output Summary (last 24 hours): Intake/Output Summary (Last 24 hours) at 11/27/17 1127  Last data filed at 11/27/17 0100   Gross per 24 hour   Intake                0 ml   Output              168 ml   Net             -168 ml       Physical Exam:     Physical Exam   Constitutional: No distress  Neck: Normal range of motion  Neck supple  No JVD present  No tracheal deviation present  No thyromegaly present  Cardiovascular: Normal rate, regular rhythm and normal heart sounds  Exam reveals no gallop  No murmur heard  Pulmonary/Chest: Effort normal and breath sounds normal  No respiratory distress  She has no wheezes  She has no rales  She exhibits no tenderness  Abdominal: Soft  Bowel sounds are normal  She exhibits no distension and no mass  There is no tenderness  There is no rebound and no guarding  Lymphadenopathy:     She has no cervical adenopathy  Neurological: She is alert  She displays normal reflexes  No cranial nerve deficit  Coordination normal    Awake, follow some command   Skin: Skin is warm  She is not diaphoretic  Psychiatric: She has a normal mood and affect         Additional Data:   Labs:    Results from last 7 days  Lab Units 11/25/17  0508 11/24/17  1314   WBC Thousand/uL 8 09 11 75*   HEMOGLOBIN g/dL 9 2* 11 5   HEMATOCRIT % 29 3* 35 5   PLATELETS Thousands/uL 275 316   NEUTROS PCT %  --  79*   LYMPHS PCT %  --  9*   MONOS PCT %  --  12   EOS PCT %  --  0       Results from last 7 days  Lab Units 11/26/17  0609 11/25/17  0508   SODIUM mmol/L 141 145   POTASSIUM mmol/L 3 5 2 7*   CHLORIDE mmol/L 107 110*   CO2 mmol/L 28 26   BUN mg/dL 7 10   CREATININE mg/dL 0 64 0 73   CALCIUM mg/dL 9 8 9 9   TOTAL PROTEIN g/dL  --  5 8*   BILIRUBIN TOTAL mg/dL  --  0 70   ALK PHOS U/L  --  45*   ALT U/L  --  15   AST U/L  -- 21   GLUCOSE RANDOM mg/dL 104 106       Results from last 7 days  Lab Units 11/24/17  1314   INR  0 98       * I Have Reviewed All Lab Data Listed Above  * Additional Pertinent Lab Tests Reviewed: All Labs Within Last 24 Hours Reviewed    Imaging:    Imaging Reports Reviewed Today Include:  Chest x-ray no active pulmonary disease  Head CT:No acute intracranial hemorrhage seen  No CT signs of acute territorial infarction  Stable CT  Cultures:   Blood Culture:   Lab Results   Component Value Date    BLOODCX No Growth at 48 hrs  11/24/2017    BLOODCX No Growth at 48 hrs  11/24/2017     Urine Culture: No results found for: URINECX  Sputum Culture: No components found for: SPUTUMCX  Wound Culture: No results found for: WOUNDCULT    Last 24 Hours Medication List:     atorvastatin 20 mg Oral QPM   docusate sodium 100 mg Oral BID   enoxaparin 40 mg Subcutaneous Daily   losartan 100 mg Oral Daily   metoprolol tartrate 25 mg Oral Q12H [address]        Today, Patient Was Seen By: Steph Albarado MD    ** Please Note: Dragon 360 Dictation voice to text software may have been used in the creation of this document   **

## 2017-11-28 PROCEDURE — 97110 THERAPEUTIC EXERCISES: CPT

## 2017-11-28 PROCEDURE — 92526 ORAL FUNCTION THERAPY: CPT

## 2017-11-28 PROCEDURE — 97112 NEUROMUSCULAR REEDUCATION: CPT

## 2017-11-28 RX ADMIN — ATORVASTATIN CALCIUM 20 MG: 20 TABLET, FILM COATED ORAL at 17:08

## 2017-11-28 RX ADMIN — METOPROLOL TARTRATE 25 MG: 25 TABLET, FILM COATED ORAL at 09:35

## 2017-11-28 RX ADMIN — LOSARTAN POTASSIUM 100 MG: 50 TABLET ORAL at 09:35

## 2017-11-28 RX ADMIN — METOPROLOL TARTRATE 25 MG: 25 TABLET, FILM COATED ORAL at 21:01

## 2017-11-28 RX ADMIN — ENOXAPARIN SODIUM 40 MG: 40 INJECTION SUBCUTANEOUS at 09:35

## 2017-11-28 NOTE — ASSESSMENT & PLAN NOTE
Improving, it was likely related with acute dehydration, electrolyte imbalance    Stable   Hold Risperidon  Waiting for placement

## 2017-11-28 NOTE — PROGRESS NOTES
Progress Note - Nicole Del Real 80 y o  female MRN: [id number]    Unit/Bed#: -01 Encounter: [id number]        * Acute encephalopathy   Assessment & Plan    Improving, it was likely related with acute dehydration, electrolyte imbalance  Stable   Hold Risperidon  Waiting for placement        Toenail deformity   Assessment & Plan    Improved after podiatric evaluation        Alzheimer's disease   Assessment & Plan    Patient returning to her baseline it was most likely related with acute and on chronic dementia  Stable  Continue to Hold Risperdal   Prevent aspiration          Hypokalemia   Assessment & Plan    Improved        Peripheral edema   Assessment & Plan    Chronic  Likely secondary to venous insufficiency        HTN (hypertension), benign   Assessment & Plan      Stable  Continue home med        Leukocytosis   Assessment & Plan    Improved        Hypercalcemia   Assessment & Plan      Improved               VTE Pharmacologic Prophylaxis:   Pharmacologic: Enoxaparin (Lovenox)  Mechanical: Mechanical VTE prophylaxis in place  Patient Centered Rounds: I have performed bedside rounds with nursing staff today  Discussions with Specialists or Other Care Team Provider:   Education and Discussions with Family / Patient:   Time Spent for Care: 20 minutes  More than 50% of total time spent on counseling and coordination of care as described above      Current Length of Stay: 4 day(s)  Current Patient Status: Inpatient   Certification Statement: The patient will continue to require additional inpatient hospital stay due to acute encephalopaty improved, waiting for placement    Discharge Plan: depend on placement  Code Status: Level 1 - Full Code    Subjective:   Patient look more awake, without acute distresss    Objective:   Vitals:   Temp (24hrs), Av 1 °F (37 3 °C), Min:98 9 °F (37 2 °C), Max:99 3 °F (37 4 °C)    HR:  [] 112  Resp:  [18] 18  BP: (128-145)/(63-67) 145/64  SpO2:  [96 %-97 %] 96 %  Body mass index is 23 05 kg/m²  Input and Output Summary (last 24 hours): Intake/Output Summary (Last 24 hours) at 11/28/17 1608  Last data filed at 11/28/17 1439   Gross per 24 hour   Intake              178 ml   Output              550 ml   Net             -372 ml       Physical Exam:     Physical Exam   Constitutional: No distress  Cardiovascular: Normal rate, regular rhythm and normal heart sounds  Exam reveals no gallop and no friction rub  No murmur heard  Pulmonary/Chest: Effort normal and breath sounds normal  No respiratory distress  She has no wheezes  She has no rales  She exhibits no tenderness  Abdominal: She exhibits no distension and no mass  There is no tenderness  There is no rebound and no guarding  Neurological: No cranial nerve deficit  Coordination normal    awake   Skin: Skin is warm  She is not diaphoretic  Additional Data:   Labs:    Results from last 7 days  Lab Units 11/25/17  0508 11/24/17  1314   WBC Thousand/uL 8 09 11 75*   HEMOGLOBIN g/dL 9 2* 11 5   HEMATOCRIT % 29 3* 35 5   PLATELETS Thousands/uL 275 316   NEUTROS PCT %  --  79*   LYMPHS PCT %  --  9*   MONOS PCT %  --  12   EOS PCT %  --  0       Results from last 7 days  Lab Units 11/26/17  0609 11/25/17  0508   SODIUM mmol/L 141 145   POTASSIUM mmol/L 3 5 2 7*   CHLORIDE mmol/L 107 110*   CO2 mmol/L 28 26   BUN mg/dL 7 10   CREATININE mg/dL 0 64 0 73   CALCIUM mg/dL 9 8 9 9   TOTAL PROTEIN g/dL  --  5 8*   BILIRUBIN TOTAL mg/dL  --  0 70   ALK PHOS U/L  --  45*   ALT U/L  --  15   AST U/L  --  21   GLUCOSE RANDOM mg/dL 104 106       Results from last 7 days  Lab Units 11/24/17  1314   INR  0 98       * I Have Reviewed All Lab Data Listed Above  * Additional Pertinent Lab Tests Reviewed: All Labs Within Last 24 Hours Reviewed    Imaging:    Imaging Reports Reviewed Today Include:     Cultures:   Blood Culture:   Lab Results   Component Value Date    BLOODCX No Growth After 4 Days  11/24/2017    BLOODCX No Growth After 4 Days  11/24/2017     Urine Culture: No results found for: URINECX  Sputum Culture: No components found for: SPUTUMCX  Wound Culture: No results found for: WOUNDCULT    Last 24 Hours Medication List:     atorvastatin 20 mg Oral QPM   docusate sodium 100 mg Oral BID   enoxaparin 40 mg Subcutaneous Daily   losartan 100 mg Oral Daily   metoprolol tartrate 25 mg Oral Q12H [address]        Today, Patient Was Seen By: Letitia Etienne MD    ** Please Note: Dragon 360 Dictation voice to text software may have been used in the creation of this document   **

## 2017-11-28 NOTE — PROGRESS NOTES
Patient resting comfortably in bed  Denies any complaints right now  Safety measures in place  Call bell within reach  Will continue to monitor

## 2017-11-28 NOTE — SPEECH THERAPY NOTE
Speech Language/Pathology                             SLP  Note  Patient Name: Albert Bueno  LFTYT'D Date: 11/28/2017     Problem List  Patient Active Problem List   Diagnosis    Psychosis    Acute encephalopathy    Hypercalcemia    Leukocytosis    HTN (hypertension), benign    Peripheral edema    HLD (hyperlipidemia)    Hyperlipidemia    Hypokalemia    Alzheimer's disease    Toenail deformity     Past Medical History  Past Medical History:   Diagnosis Date    Anxiety     Depression     HLD (hyperlipidemia) 11/24/2017    HTN (hypertension), benign 11/24/2017    Peripheral edema 11/24/2017    Psychiatric illness     Psychosis      Past Surgical History  Past Surgical History:   Procedure Laterality Date    APPENDECTOMY      CEREBRAL ANEURYSM REPAIR      HYSTERECTOMY           Subjective:  Pt seen for dysphagia treatment  Pt alert and pleasantly confused  Objective:  Pt's voice is very quiet, difficult to comprehend/hear, as she talks frequently  She is also confused, and much of what she says does not pertain to the task at hand  RN reports pt is not eating much  While sitting up at 90 degrees, pt was fed 4 oz applesauce, 7-8 sips of Ensure by straw, and 1 sip of water by straw  Pt refused to take more  Bolus retrieval and draw from straw was adequate  Bolus manipulation/formation was slow; swallows appear prompt, with laryngeal elevation palpated and judged adequate  There were no clinical s/s of aspiration  Assessment:  Pt appears to be tolerating pureed diet and thin liquids well, with no clinical s/s of aspiration  Reduced PO intake, possibly due to confusion/dementia  Plan/Recommendations:  Continue with pureed and thin liquids  Trial mech soft foods, with dentures in place  Pt needs assistance and encouragement to eat, reduce distractions  Aspiration precautions

## 2017-11-28 NOTE — SUBJECTIVE & OBJECTIVE
VTE Pharmacologic Prophylaxis:   Pharmacologic: Enoxaparin (Lovenox)  Mechanical: Mechanical VTE prophylaxis in place  Patient Centered Rounds: I have performed bedside rounds with nursing staff today  Discussions with Specialists or Other Care Team Provider:   Education and Discussions with Family / Patient:   Time Spent for Care: 20 minutes  More than 50% of total time spent on counseling and coordination of care as described above  Current Length of Stay: 4 day(s)  Current Patient Status: Inpatient   Certification Statement: The patient will continue to require additional inpatient hospital stay due to acute encephalopaty improved, waiting for placement    Discharge Plan: depend on placement  Code Status: Level 1 - Full Code    Subjective:   Patient look more awake, without acute distresss    Objective:   Vitals:   Temp (24hrs), Av 1 °F (37 3 °C), Min:98 9 °F (37 2 °C), Max:99 3 °F (37 4 °C)    HR:  [] 112  Resp:  [18] 18  BP: (128-145)/(63-67) 145/64  SpO2:  [96 %-97 %] 96 %  Body mass index is 23 05 kg/m²  Input and Output Summary (last 24 hours): Intake/Output Summary (Last 24 hours) at 17 1608  Last data filed at 17 1439   Gross per 24 hour   Intake              178 ml   Output              550 ml   Net             -372 ml       Physical Exam:     Physical Exam   Constitutional: No distress  Cardiovascular: Normal rate, regular rhythm and normal heart sounds  Exam reveals no gallop and no friction rub  No murmur heard  Pulmonary/Chest: Effort normal and breath sounds normal  No respiratory distress  She has no wheezes  She has no rales  She exhibits no tenderness  Abdominal: She exhibits no distension and no mass  There is no tenderness  There is no rebound and no guarding  Neurological: No cranial nerve deficit  Coordination normal    awake   Skin: Skin is warm  She is not diaphoretic         Additional Data:   Labs:    Results from last 7 days  Lab Units 11/25/17  0508 11/24/17  1314   WBC Thousand/uL 8 09 11 75*   HEMOGLOBIN g/dL 9 2* 11 5   HEMATOCRIT % 29 3* 35 5   PLATELETS Thousands/uL 275 316   NEUTROS PCT %  --  79*   LYMPHS PCT %  --  9*   MONOS PCT %  --  12   EOS PCT %  --  0       Results from last 7 days  Lab Units 11/26/17  0609 11/25/17  0508   SODIUM mmol/L 141 145   POTASSIUM mmol/L 3 5 2 7*   CHLORIDE mmol/L 107 110*   CO2 mmol/L 28 26   BUN mg/dL 7 10   CREATININE mg/dL 0 64 0 73   CALCIUM mg/dL 9 8 9 9   TOTAL PROTEIN g/dL  --  5 8*   BILIRUBIN TOTAL mg/dL  --  0 70   ALK PHOS U/L  --  45*   ALT U/L  --  15   AST U/L  --  21   GLUCOSE RANDOM mg/dL 104 106       Results from last 7 days  Lab Units 11/24/17  1314   INR  0 98       * I Have Reviewed All Lab Data Listed Above  * Additional Pertinent Lab Tests Reviewed: All Labs Within Last 24 Hours Reviewed    Imaging:    Imaging Reports Reviewed Today Include:     Cultures:   Blood Culture:   Lab Results   Component Value Date    BLOODCX No Growth After 4 Days  11/24/2017    BLOODCX No Growth After 4 Days  11/24/2017     Urine Culture: No results found for: URINECX  Sputum Culture: No components found for: SPUTUMCX  Wound Culture: No results found for: WOUNDCULT    Last 24 Hours Medication List:     atorvastatin 20 mg Oral QPM   docusate sodium 100 mg Oral BID   enoxaparin 40 mg Subcutaneous Daily   losartan 100 mg Oral Daily   metoprolol tartrate 25 mg Oral Q12H [address]        Today, Patient Was Seen By: Letitia Etienne MD    ** Please Note: Dragon 360 Dictation voice to text software may have been used in the creation of this document   **

## 2017-11-28 NOTE — PHYSICAL THERAPY NOTE
PHYSICAL THERAPY NOTE    Patient Name: Griselda Pill GOTAN'O Date: 11/28/2017 11/28/17 1600   Pain Assessment   Pain Assessment FLACC   Pain Rating: FLACC (Rest) - Face 0   Pain Rating: FLACC (Rest) - Legs 0   Pain Rating: FLACC (Rest) - Activity 0   Pain Rating: FLACC (Rest) - Cry 0   Pain Rating: FLACC (Rest) - Consolability 0   Score: FLACC (Rest) 0   Pain Rating: FLACC (Activity) - Face 1   Pain Rating: FLACC (Activity) - Legs 1   Pain Rating: FLACC (Activity) - Activity 1   Pain Rating: FLACC (Activity) - Cry 0   Pain Rating: FLACC (Activity) - Consolability 1   Score: FLACC (Activity) 4   Restrictions/Precautions   Weight Bearing Precautions Per Order No   Other Precautions Cognitive; Chair Alarm; Bed Alarm; Fall Risk   General   Chart Reviewed Yes   Response to Previous Treatment Patient unable to report, no changes reported from family or staff   Family/Caregiver Present No   Cognition   Overall Cognitive Status Impaired   Orientation Level Oriented to person;Disoriented to place; Disoriented to time;Disoriented to situation   Memory Unable to assess   Following Commands Follows one step commands inconsistently   Subjective   Subjective Pt's speech difficuult to understand consistently during session  Limited command following, eyes open 75% of session, moreso with upright sitting/standing tolerance  Needs encouragement to participate  Bed Mobility   Supine to Sit 2  Maximal assistance   Additional items Assist x 1; Increased time required;Verbal cues;HOB elevated   Sit to Supine 1  Dependent   Additional items Assist x 2; Increased time required;LE management;Verbal cues   Additional Comments Pt dependent Ax2 for Positioning toward head of bed, and Dependent for positioning with heels suspended for limiting pressure      Transfers   Sit to Stand 2  Maximal assistance  (successful on second attempt with quick move for UE support)   Additional items Assist x 2; Increased time required;Verbal cues  (using pad as sling)   Stand to Sit 2  Maximal assistance   Additional items Assist x 2; Increased time required;Verbal cues   Ambulation/Elevation   Gait pattern Not appropriate   Balance   Static Sitting (fluctuates from fair to poor+ throughout 7 min sitting leigh)   Static Standing Zero  (Ax2; 90 seconds standing tolerance)   Endurance Deficit   Endurance Deficit Yes   Endurance Deficit Description limited sitting and standing tolernace, lethargic by end of session   Activity Tolerance   Activity Tolerance Patient limited by fatigue  (mental status)   Medical Staff Made Aware Yusef PTA Assisted with transfers and return back to bed  Nurse Made Aware spoke to [facility]   Exercises   Ankle Pumps Supine;5 reps;AAROM; Bilateral   Assessment   Prognosis Fair   Problem List Decreased strength;Decreased endurance; Impaired balance;Decreased mobility; Decreased coordination;Decreased cognition; Impaired judgement;Decreased safety awareness   Assessment Based on initial eval, pt made no substantial progress this session, as she needed max a for bed mobility, and max A of 2 for sit<>stand  Pt was able to perform standing for 90 seconds, and may erlinda more approiprate for using quick move for OOB transfer next session  Recommend trials for that in future plus further LE strengthening and rom  Goals   Patient Goals to go home   STG Expiration Date 12/05/17   Treatment Day 1   Plan   Treatment/Interventions Functional transfer training;LE strengthening/ROM; Therapeutic exercise; Endurance training;Bed mobility; Equipment eval/education;Patient/family training;Cognitive reorientation;Spoke to nursing   Progress No functional improvements   PT Frequency 2-3x/wk   Recommendation   Recommendation Long-term skilled PT   Skilled PT recommended while in hospital and upon DC to progress pt toward treatment goals     Dave Dennis, PT

## 2017-11-28 NOTE — CASE MANAGEMENT
Continued Stay Review    Date: 11/28/2017    Vital Signs: /63   Pulse 91   Temp 99 1 °F (37 3 °C) (Oral)   Resp 18   Ht 5' 4" (1 626 m)   Wt 60 9 kg (134 lb 4 2 oz)   SpO2 97%   BMI 23 05 kg/m²     Medications:   Scheduled Meds:   atorvastatin 20 mg Oral QPM   docusate sodium 100 mg Oral BID   enoxaparin 40 mg Subcutaneous Daily   losartan 100 mg Oral Daily   metoprolol tartrate 25 mg Oral Q12H [address]     Continuous Infusions:    PRN Meds:   acetaminophen    labetalol    metoprolol    ondansetron    polyvinyl alcohol    Abnormal Labs/Diagnostic Results:     Age/Sex: 80 y o  female     Assessment/Plan:  Acute encephalopathy   Assessment & Plan     Improving, it was likely related with acute dehydration, electrolyte imbalance  Stable   Hold Risperidon  Follow psychiatry evaluation       Toenail deformity   Assessment & Plan     Improved after podiatric evaluation       Alzheimer's disease   Assessment & Plan     Patient returning to her baseline it was most likely related with acute and on chronic dementia  Stable  Continue to Hold Risperdal   Prevent aspiration  Advance to thick diet        Hypokalemia   Assessment & Plan     Improved       HLD (hyperlipidemia)   Assessment & Plan     Cont Statin       Peripheral edema   Assessment & Plan     Chronic   Likely secondary to venous insufficiency       HTN (hypertension), benign   Assessment & Plan        Stable  Continue home med       Leukocytosis   Assessment & Plan     Improved       Hypercalcemia   Assessment & Plan        Improved         Certification Statement: The patient will continue to require additional inpatient hospital stay due to acute encephalophaty             Discharge Plan: plan is str

## 2017-11-28 NOTE — PLAN OF CARE
Problem: DISCHARGE PLANNING - CARE MANAGEMENT  Goal: Discharge to post-acute care or home with appropriate resources  INTERVENTIONS:  - Conduct assessment to determine patient/family and health care team treatment goals, and need for post-acute services based on payer coverage, community resources, and patient preferences, and barriers to discharge  - Address psychosocial, clinical, and financial barriers to discharge as identified in assessment in conjunction with the patient/family and health care team  - Arrange appropriate level of post-acute services according to patients   needs and preference and payer coverage in collaboration with the physician and health care team  - Communicate with and update the patient/family, physician, and health care team regarding progress on the discharge plan  - Arrange appropriate transportation to post-acute venues   Outcome: Progressing  LOS 4  Not a bundle; not a readmission  Cm received call from pt's daughter in order to discuss rehab choices  Cm s/w daughter at length as she had many questions  She does not want her mother to go to a [facility] and provided choices of [facility] and [facility]  Cm explained [facility] is owned by [facility] and she stated she would then only want a referral made to [facility]  Daughter had many questions related to insurance and MA  Cm discussed potential LT placement as well  Cm directed some questions to the facility as they would know better  Referral was made to [facility] who is able to accept him and began starting the process for auth from [facility] [employer]  Liaison then found pt has been in [address] in March of 2017 however pt has severe dementia  Cm called and left a message for Laxmi Weaver at [facility] to discuss further  Cm completed PASRR and uploaded it into [facility]  Cm will follow up with [facility] if needed as well as [facility]

## 2017-11-28 NOTE — ASSESSMENT & PLAN NOTE
Patient returning to her baseline it was most likely related with acute and on chronic dementia  Stable  Continue to Hold Risperdal   Prevent aspiration

## 2017-11-28 NOTE — PLAN OF CARE
Problem: PHYSICAL THERAPY ADULT  Goal: Performs mobility at highest level of function for planned discharge setting  See evaluation for individualized goals  Treatment/Interventions: Functional transfer training, LE strengthening/ROM, Therapeutic exercise, Endurance training, Patient/family training, Equipment eval/education, Bed mobility, Gait training          See flowsheet documentation for full assessment, interventions and recommendations  Outcome: Not Progressing  Prognosis: Fair  Problem List: Decreased strength, Decreased endurance, Impaired balance, Decreased mobility, Decreased coordination, Decreased cognition, Impaired judgement, Decreased safety awareness  Assessment: Based on initial eval, pt made no substantial progress this session, as she needed max a for bed mobility, and max A of 2 for sit<>stand  Pt was able to perform standing for 90 seconds, and may erlinda more approiprate for using quick move for OOB transfer next session  Recommend trials for that in future plus further LE strengthening and rom  Recommendation: Long-term skilled PT          See flowsheet documentation for full assessment

## 2017-11-28 NOTE — PLAN OF CARE
Problem: DISCHARGE PLANNING - CARE MANAGEMENT  Goal: Discharge to post-acute care or home with appropriate resources  INTERVENTIONS:  - Conduct assessment to determine patient/family and health care team treatment goals, and need for post-acute services based on payer coverage, community resources, and patient preferences, and barriers to discharge  - Address psychosocial, clinical, and financial barriers to discharge as identified in assessment in conjunction with the patient/family and health care team  - Arrange appropriate level of post-acute services according to patients   needs and preference and payer coverage in collaboration with the physician and health care team  - Communicate with and update the patient/family, physician, and health care team regarding progress on the discharge plan  - Arrange appropriate transportation to post-acute venues   Outcome: Progressing  LOS 4  Not a bundle; Not a readmission  Cm contacted pt's daughter to discuss choices for rehab  Daughter stated she was at work and the paper with choices is in the car  She stated she is not able to review list with Cm as she did not choose the facilities, a friend of the family did  Cm asked when it would be possible for daughter to call back with choices and daughter requested doing so tomorrow  Cm explained that since pt is ready for DC and insurance auth would need to be obtained, it would be best for her to contact Cm as soon as possible today  Cm suggested being able to go to her car at lunch  Daughter stated she is able to do this and will get back to Cm around 2:30pm   Cm will continue to follow to assist with DCP

## 2017-11-29 ENCOUNTER — APPOINTMENT (INPATIENT)
Dept: RADIOLOGY | Facility: HOSPITAL | Age: 82
DRG: 643 | End: 2017-11-29
Payer: COMMERCIAL

## 2017-11-29 LAB
ALBUMIN SERPL BCP-MCNC: 2.2 G/DL (ref 3.5–5)
ALP SERPL-CCNC: 66 U/L (ref 46–116)
ALT SERPL W P-5'-P-CCNC: 13 U/L (ref 12–78)
ANION GAP SERPL CALCULATED.3IONS-SCNC: 9 MMOL/L (ref 4–13)
AST SERPL W P-5'-P-CCNC: 17 U/L (ref 5–45)
BACTERIA BLD CULT: NORMAL
BACTERIA BLD CULT: NORMAL
BACTERIA UR QL AUTO: ABNORMAL /HPF
BASOPHILS # BLD AUTO: 0.01 THOUSANDS/ΜL (ref 0–0.1)
BASOPHILS NFR BLD AUTO: 0 % (ref 0–1)
BILIRUB SERPL-MCNC: 0.5 MG/DL (ref 0.2–1)
BILIRUB UR QL STRIP: NEGATIVE
BUN SERPL-MCNC: 15 MG/DL (ref 5–25)
CALCIUM SERPL-MCNC: 10.3 MG/DL (ref 8.3–10.1)
CHLORIDE SERPL-SCNC: 102 MMOL/L (ref 100–108)
CLARITY UR: ABNORMAL
CO2 SERPL-SCNC: 30 MMOL/L (ref 21–32)
COLOR UR: YELLOW
CREAT SERPL-MCNC: 0.7 MG/DL (ref 0.6–1.3)
EOSINOPHIL # BLD AUTO: 0 THOUSAND/ΜL (ref 0–0.61)
EOSINOPHIL NFR BLD AUTO: 0 % (ref 0–6)
ERYTHROCYTE [DISTWIDTH] IN BLOOD BY AUTOMATED COUNT: 14 % (ref 11.6–15.1)
GFR SERPL CREATININE-BSD FRML MDRD: 93 ML/MIN/1.73SQ M
GLUCOSE SERPL-MCNC: 100 MG/DL (ref 65–140)
GLUCOSE UR STRIP-MCNC: NEGATIVE MG/DL
HCT VFR BLD AUTO: 34.6 % (ref 34.8–46.1)
HGB BLD-MCNC: 11.2 G/DL (ref 11.5–15.4)
HGB UR QL STRIP.AUTO: ABNORMAL
KETONES UR STRIP-MCNC: ABNORMAL MG/DL
LEUKOCYTE ESTERASE UR QL STRIP: NEGATIVE
LYMPHOCYTES # BLD AUTO: 1.25 THOUSANDS/ΜL (ref 0.6–4.47)
LYMPHOCYTES NFR BLD AUTO: 10 % (ref 14–44)
MCH RBC QN AUTO: 27.7 PG (ref 26.8–34.3)
MCHC RBC AUTO-ENTMCNC: 32.4 G/DL (ref 31.4–37.4)
MCV RBC AUTO: 86 FL (ref 82–98)
MONOCYTES # BLD AUTO: 1.57 THOUSAND/ΜL (ref 0.17–1.22)
MONOCYTES NFR BLD AUTO: 12 % (ref 4–12)
NEUTROPHILS # BLD AUTO: 10.11 THOUSANDS/ΜL (ref 1.85–7.62)
NEUTS SEG NFR BLD AUTO: 78 % (ref 43–75)
NITRITE UR QL STRIP: NEGATIVE
NON-SQ EPI CELLS URNS QL MICRO: ABNORMAL /HPF
PH UR STRIP.AUTO: 6.5 [PH] (ref 4.5–8)
PLATELET # BLD AUTO: 434 THOUSANDS/UL (ref 149–390)
PMV BLD AUTO: 10.2 FL (ref 8.9–12.7)
POTASSIUM SERPL-SCNC: 3.8 MMOL/L (ref 3.5–5.3)
PROT SERPL-MCNC: 6.8 G/DL (ref 6.4–8.2)
PROT UR STRIP-MCNC: NEGATIVE MG/DL
RBC # BLD AUTO: 4.04 MILLION/UL (ref 3.81–5.12)
RBC #/AREA URNS AUTO: ABNORMAL /HPF
SODIUM SERPL-SCNC: 141 MMOL/L (ref 136–145)
SP GR UR STRIP.AUTO: 1.01 (ref 1–1.03)
UROBILINOGEN UR QL STRIP.AUTO: >=8 E.U./DL
WBC # BLD AUTO: 12.94 THOUSAND/UL (ref 4.31–10.16)
WBC #/AREA URNS AUTO: ABNORMAL /HPF

## 2017-11-29 PROCEDURE — 71010 HB CHEST X-RAY 1 VIEW FRONTAL (PORTABLE): CPT

## 2017-11-29 PROCEDURE — G8988 SELF CARE GOAL STATUS: HCPCS

## 2017-11-29 PROCEDURE — 80053 COMPREHEN METABOLIC PANEL: CPT | Performed by: FAMILY MEDICINE

## 2017-11-29 PROCEDURE — G8987 SELF CARE CURRENT STATUS: HCPCS

## 2017-11-29 PROCEDURE — 97167 OT EVAL HIGH COMPLEX 60 MIN: CPT

## 2017-11-29 PROCEDURE — 81001 URINALYSIS AUTO W/SCOPE: CPT | Performed by: FAMILY MEDICINE

## 2017-11-29 PROCEDURE — 85025 COMPLETE CBC W/AUTO DIFF WBC: CPT | Performed by: FAMILY MEDICINE

## 2017-11-29 RX ADMIN — ENOXAPARIN SODIUM 40 MG: 40 INJECTION SUBCUTANEOUS at 10:00

## 2017-11-29 NOTE — PLAN OF CARE
Problem: OCCUPATIONAL THERAPY ADULT  Goal: Performs self-care activities at highest level of function for planned discharge setting  See evaluation for individualized goals  Treatment Interventions: ADL retraining, Functional transfer training, UE strengthening/ROM, Cognitive reorientation, Patient/family training, Continued evaluation, Activityengagement  Equipment Recommended: Bedside commode, Tub seat with back       See flowsheet documentation for full assessment, interventions and recommendations  Limitation: Decreased ADL status, Decreased UE strength, Decreased cognition, Decreased self-care trans, Decreased high-level ADLs     Assessment: Pt is an 81 yo female [facility]  Pt presents w/ acute encephalopathy and significant PMH impacting her occupational performance including cerebral aneurysm repair, osteoporosis, hx brain injury, Alzheimer's, and auditory/ visual hallucinations  Pt unable to provide social history / PLOF upon evaluation  Per pt's chart, pt lived alone in [address] [place] [address] w/ 2 ADRIEL PTA  Pt's brother stopped by daily w/ food, and pt used cane for functional mobility  Pt required max A to complete bed mobility supine to sit, and max A x2 for SPT bed to chair  Pt requires max A to complete ADL, and requires consistent stimuli to maintain arousal briefly  Pt presents w/ decreased arousal, decreased orientation, decreaed attention, decreased activity tolerance, decreaed balance, generalized weakness, and decreased balance impacting her I w/ dressing, bathing, grooming, feeding, clothing mgmt, functional mobility, functional transfers  Pt would benefit from OT while in acute care to address deficits, and post acute rehab when medically stable for discharge from acute care   Will continue to follow pt  Recommendation: Geriatric Consult (Neurology consult)  OT Discharge Recommendation:  (post acut rehab)  OT - OK to Discharge: No

## 2017-11-29 NOTE — PLAN OF CARE
Problem: Potential for Falls  Goal: Patient will remain free of falls  INTERVENTIONS:  - Assess patient frequently for physical needs  -  Identify cognitive and physical deficits and behaviors that affect risk of falls    -  [place] fall precautions as indicated by assessment   - Educate patient/family on patient safety including physical limitations  - Instruct patient to call for assistance with activity based on assessment  - Modify environment to reduce risk of injury  - Consider OT/PT consult to assist with strengthening/mobility   Outcome: Progressing      Problem: Prexisting or High Potential for Compromised Skin Integrity  Goal: Skin integrity is maintained or improved  INTERVENTIONS:  - Identify patients at risk for skin breakdown  - Assess and monitor skin integrity  - Assess and monitor nutrition and hydration status  - Monitor labs (i e  albumin)  - Assess for incontinence   - Turn and reposition patient  - Assist with mobility/ambulation  - Relieve pressure over bony prominences  - Avoid friction and shearing  - Provide appropriate hygiene as needed including keeping skin clean and dry  - Evaluate need for skin moisturizer/barrier cream  - Collaborate with interdisciplinary team (i e  Nutrition, Rehabilitation, etc )   - Patient/family teaching   Outcome: Progressing      Problem: SAFETY,RESTRAINT: NV/NON-SELF DESTRUCTIVE BEHAVIOR  Goal: Remains free of harm/injury (restraint for non violent/non self-detsructive behavior)  INTERVENTIONS:  - Instruct patient/family regarding restraint use   - Assess and monitor physiologic and psychological status   - Provide interventions and comfort measures to meet assessed patient needs   - Identify and implement measures to help patient regain control  - Assess readiness for release of restraint    Outcome: Progressing    Goal: Returns to optimal restraint-free functioning  INTERVENTIONS:  - Assess the patient's behavior and symptoms that indicate continued need for restraint  - Identify and implement measures to help patient regain control  - Assess readiness for release of restraint    Outcome: Progressing      Problem: Nutrition/Hydration-ADULT  Goal: Nutrient/Hydration intake appropriate for improving, restoring or maintaining nutritional needs  Monitor and assess patient's nutrition/hydration status for malnutrition (ex- brittle hair, bruises, dry skin, pale skin and conjunctiva, muscle wasting, smooth red tongue, and disorientation)  Collaborate with interdisciplinary team and initiate plan and interventions as ordered  Monitor patient's weight and dietary intake as ordered or per policy  Utilize nutrition screening tool and intervene per policy  Determine patient's food preferences and provide high-protein, high-caloric foods as appropriate       INTERVENTIONS:  - Monitor oral intake, urinary output, labs, and treatment plans  - Assess nutrition and hydration status and recommend course of action  - Evaluate amount of meals eaten  - Assist patient with eating if necessary   - Allow adequate time for meals  - Recommend/ encourage appropriate diets, oral nutritional supplements, and vitamin/mineral supplements  - Order, calculate, and assess calorie counts as needed  - Recommend, monitor, and adjust tube feedings and TPN/PPN based on assessed needs  - Assess need for intravenous fluids  - Provide specific nutrition/hydration education as appropriate  - Include patient/family/caregiver in decisions related to nutrition   Outcome: Progressing      Problem: DISCHARGE PLANNING - CARE MANAGEMENT  Goal: Discharge to post-acute care or home with appropriate resources  INTERVENTIONS:  - Conduct assessment to determine patient/family and health care team treatment goals, and need for post-acute services based on payer coverage, community resources, and patient preferences, and barriers to discharge  - Address psychosocial, clinical, and financial barriers to discharge as identified in assessment in conjunction with the patient/family and health care team  - Arrange appropriate level of post-acute services according to patients   needs and preference and payer coverage in collaboration with the physician and health care team  - Communicate with and update the patient/family, physician, and health care team regarding progress on the discharge plan  - Arrange appropriate transportation to post-acute venues   Outcome: Progressing

## 2017-11-29 NOTE — PROGRESS NOTES
Progress Note - Homero Brock 80 y o  female MRN: [id number]    Unit/Bed#: -01 Encounter: [id number]        * Acute encephalopathy   Assessment & Plan    Improving, it was likely related with acute dehydration, electrolyte imbalance  Stable   Hold Risperidon  Waiting for placement        Toenail deformity   Assessment & Plan    Improved after podiatric evaluation        Alzheimer's disease   Assessment & Plan    Patient returning to her baseline it was most likely related with acute and on chronic dementia  Stable  Continue to Hold Risperdal   Prevent aspiration          Hypokalemia   Assessment & Plan    Follow BMP        HLD (hyperlipidemia)   Assessment & Plan    Cont Statin        Peripheral edema   Assessment & Plan    Chronic  Likely secondary to venous insufficiency        HTN (hypertension), benign   Assessment & Plan      Stable  Continue home med        Leukocytosis   Assessment & Plan    Improved        Hypercalcemia   Assessment & Plan      Improved               VTE Pharmacologic Prophylaxis:   Pharmacologic: Enoxaparin (Lovenox)  Mechanical: Mechanical VTE prophylaxis in place  Patient Centered Rounds: I have performed bedside rounds with nursing staff today  Discussions with Specialists or Other Care Team Provider:    Education and Discussions with Family / Patient:   Time Spent for Care: 20 minutes  More than 50% of total time spent on counseling and coordination of care as described above  Current Length of Stay: 5 day(s)  Current Patient Status: Inpatient   Certification Statement: The patient will continue to require additional inpatient hospital stay due to acute encephalopthy     Discharge Plan: depend on clinical course and placement  Code Status: Level 1 - Full Code    Subjective:   Patient looks sleepy, awake with stimulation       Objective:   Vitals:   Temp (24hrs), Av 4 °F (37 4 °C), Min:99 °F (37 2 °C), Max:99 8 °F (37 7 °C)    HR:  [109-114] 109  Resp: [18] 18  BP: (136-155)/(64-79) 136/68  SpO2:  [96 %-97 %] 97 %  Body mass index is 23 05 kg/m²  Input and Output Summary (last 24 hours): Intake/Output Summary (Last 24 hours) at 11/29/17 1350  Last data filed at 11/29/17 1049   Gross per 24 hour   Intake              180 ml   Output             1373 ml   Net            -1193 ml       Physical Exam:     Physical Exam   Constitutional: No distress  Cardiovascular: Normal rate, regular rhythm and normal heart sounds  Exam reveals no gallop and no friction rub  No murmur heard  Pulmonary/Chest: No respiratory distress  She has no wheezes  She has no rales  She exhibits no tenderness  Abdominal: Soft  She exhibits no distension and no mass  There is no tenderness  There is no rebound and no guarding  Neurological: She is alert  She displays normal reflexes  No cranial nerve deficit  She exhibits normal muscle tone  Coordination normal    Sleepy    Skin: Skin is warm  She is not diaphoretic  Psychiatric: She has a normal mood and affect  Additional Data:   Labs:    Results from last 7 days  Lab Units 11/25/17  0508 11/24/17  1314   WBC Thousand/uL 8 09 11 75*   HEMOGLOBIN g/dL 9 2* 11 5   HEMATOCRIT % 29 3* 35 5   PLATELETS Thousands/uL 275 316   NEUTROS PCT %  --  79*   LYMPHS PCT %  --  9*   MONOS PCT %  --  12   EOS PCT %  --  0       Results from last 7 days  Lab Units 11/26/17  0609 11/25/17  0508   SODIUM mmol/L 141 145   POTASSIUM mmol/L 3 5 2 7*   CHLORIDE mmol/L 107 110*   CO2 mmol/L 28 26   BUN mg/dL 7 10   CREATININE mg/dL 0 64 0 73   CALCIUM mg/dL 9 8 9 9   TOTAL PROTEIN g/dL  --  5 8*   BILIRUBIN TOTAL mg/dL  --  0 70   ALK PHOS U/L  --  45*   ALT U/L  --  15   AST U/L  --  21   GLUCOSE RANDOM mg/dL 104 106       Results from last 7 days  Lab Units 11/24/17  1314   INR  0 98       * I Have Reviewed All Lab Data Listed Above  * Additional Pertinent Lab Tests Reviewed:  All Labs Within Last 24 Hours Reviewed    Imaging:    Imaging Reports Reviewed Today Include:     Cultures:   Blood Culture:   Lab Results   Component Value Date    BLOODCX No Growth After 4 Days  11/24/2017    BLOODCX No Growth After 4 Days  11/24/2017     Urine Culture: No results found for: URINECX  Sputum Culture: No components found for: SPUTUMCX  Wound Culture: No results found for: WOUNDCULT    Last 24 Hours Medication List:     atorvastatin 20 mg Oral QPM   docusate sodium 100 mg Oral BID   enoxaparin 40 mg Subcutaneous Daily   losartan 100 mg Oral Daily   metoprolol tartrate 25 mg Oral Q12H [address]        Today, Patient Was Seen By: Aquilino Dennis MD    ** Please Note: Dragon 360 Dictation voice to text software may have been used in the creation of this document   **

## 2017-11-29 NOTE — OCCUPATIONAL THERAPY NOTE
[address]  Evaluation     Patient Name: Artis Live  [id number] Date: 11/29/2017  Problem List  Patient Active Problem List   Diagnosis    Psychosis    Acute encephalopathy    Hypercalcemia    Leukocytosis    HTN (hypertension), benign    Peripheral edema    HLD (hyperlipidemia)    Hyperlipidemia    Hypokalemia    Alzheimer's disease    Toenail deformity     Past Medical History  Past Medical History:   Diagnosis Date    Anxiety     Depression     HLD (hyperlipidemia) 11/24/2017    HTN (hypertension), benign 11/24/2017    Peripheral edema 11/24/2017    Psychiatric illness     Psychosis      Past Surgical History  Past Surgical History:   Procedure Laterality Date    APPENDECTOMY      CEREBRAL ANEURYSM REPAIR      HYSTERECTOMY        11/29/17 1049   Note Type   Note type Eval/Treat   Restrictions/Precautions   Weight Bearing Precautions Per Order No   Other Precautions Cognitive; Chair Alarm; Bed Alarm; Fall Risk;Aspiration   Pain Assessment   Pain Assessment FLACC   Pain Rating: FLACC (Rest) - Face 0   Pain Rating: FLACC (Rest) - Legs 0   Pain Rating: FLACC (Rest) - Activity 0   Pain Rating: FLACC (Rest) - Cry 0   Pain Rating: FLACC (Rest) - Consolability 0   Score: FLACC (Rest) 0   Pain Rating: FLACC (Activity) - Face 1   Pain Rating: FLACC (Activity) - Legs 1   Pain Rating: FLACC (Activity) - Activity 0   Pain Rating: FLACC (Activity) - Cry 1   Pain Rating: FLACC (Activity) - Consolability 1   Score: FLACC (Activity) 4   Home Living   Type of Home Other (Comment)  ([address] [facility] w/ 2 ADRIEL per chart)   Home Layout Two level;Stairs to enter with rails;Bed/bath upstairs   Bathroom Shower/Tub (pt unable to report)   Bathroom Toilet (pt unable to report)   [address] [facility]; Other (Comment)  (per pt chart, unable to report)   Additional Comments Pt not accurate historian upon evaluation, and unable to provide social history  Per chart, pt lives alone in [address] [facility] w/ 2 ADRIEL     Prior Function   Level of [place] Independent with ADLs and functional mobility   Lives With Alone   Receives Help From Family  (brother comes by daily and brings food)   ADL Assistance Independent   IADLs Needs assistance   Falls in the last 6 months 1 to 4  (pt unable to report; at least 1)   Comments Per chart, pt completed ADL's [place]  Pt does not drive, and used a cane for functional mobility   Lifestyle   Autonomy Pt not accurate historian upon evaluation; pt lethargic, and required consistent to maintain arousal briefly   Reciprocal Relationships Pt lives alone, and per chart pt's brother comes by daily    Service to Others Pt is retired   Intrinsic Gratification Pt unable to report leisure interests / hobbies upon evaluation   ADL   Where Assessed (seated EOB, supine HOB elevated)   Eating Assistance Unable to assess   Grooming Assistance 2  Maximal Assistance   [address] 1  Total Assistance   LB Bathing Assistance 1  Total Assistance   [address] 2  Maximal Assistance   LB Dressing Assistance 2  Maximal [address]  1  Total Assistance   Additional Comments pt requires max A to complete ADL's   Bed Mobility   Supine to Sit 2  Maximal assistance   Additional items Assist x 1; Increased time required;Verbal cues;LE management   Sit to Supine Unable to assess   Additional Comments Pt seated OOB in chair post eval w/ call bell in reach and chair alarm activated   Transfers   Sit to Stand 2  Maximal assistance   Additional items Increased time required;Verbal cues; Assist x 2  (RNGretchen 2nd A)   Stand to Sit 2  Maximal assistance   Stand pivot 2  Maximal assistance   Additional items Assist x 2; Increased time required  (bed to chair)   Functional Mobility   Functional Mobility (unable to assess)   Balance   Static Sitting Poor  (fluctuates from fair- to poor)   Static Standing Zero   Ambulatory Zero   Activity Tolerance   Activity Tolerance Patient limited by fatigue  (mental status, arousal)   Medical Staff Made Aware spoke to SLIM   Nurse Made Aware spoke to RNTheo and PCA, Cb Friends   RUE Assessment   RUE Assessment WFL  ([facility])   RUE Strength   RUE Overall Strength Unable to assess;Due to cognitive deficits   LUE Assessment   LUE Assessment WFL  ([facility])   LUE Strength   LUE Overall Strength Unable to assess;Due to cognitive deficits   Sensation   Light Touch Not tested  (responded to touch from therapist upon arrival)   Cognition   Overall Cognitive Status Impaired   Arousal/Participation Lethargic;Persistent stimuli required;Poorly responsive   Attention REBECCA   Orientation Level Oriented to person;Disoriented to situation;Disoriented to time;Disoriented to place   Memory Unable to assess   Following Commands Unable to follow one step commands   Comments Pt lethargic, and required consistent stimuli to mainta arousal briefly  Pt unable to provide social history or follow one step directions  Pt oriented to person; unable to report birthdate  Pt mumbling and difficult to understand    Assessment   Limitation Decreased ADL status; Decreased UE strength;Decreased cognition;Decreased self-care trans;Decreased high-level ADLs   Assessment Pt is an 79 yo female [facility] on [facility]107  Pt presents w/ acute encephalopathy and significant PMH impacting her occupational performance including cerebral aneurysm repair, osteoporosis, hx brain injury, Alzheimer's, and auditory/ visual hallucinations  Pt unable to provide social history / PLOF upon evaluation  Per pt's chart, pt lived alone in [address] [place] [address] w/ 2 ADRIEL PTA  Pt's brother stopped by daily w/ food, and pt used cane for functional mobility  Pt required max A to complete bed mobility supine to sit, and max A x2 for SPT bed to chair  Pt requires max A to complete ADL, and requires consistent stimuli to maintain arousal briefly   Pt presents w/ decreased arousal, decreased orientation, decreaed attention, decreased activity tolerance, decreaed balance, generalized weakness, and decreased balance impacting her I w/ dressing, bathing, grooming, feeding, clothing mgmt, functional mobility, functional transfers  Pt would benefit from OT while in acute care to address deficits, and post acute rehab when medically stable for discharge from acute care  Will continue to follow pt   Goals   Patient Goals none stated   Plan   Treatment Interventions ADL retraining;Functional transfer training;UE strengthening/ROM; Cognitive reorientation;Patient/family training;Continued evaluation; Activityengagement   Goal Expiration Date 12/09/17   OT Frequency 3-5x/wk   Recommendation   Recommendation Geriatric Consult  (Neurology consult)   OT Discharge Recommendation (post acut rehab)   Equipment Recommended Bedside commode;Tub seat with back   OT - OK to Discharge No   Barthel Index   Feeding 0   Bathing 0   Grooming Score 0   Dressing Score 0   Bladder Score 0   Bowels Score 0   Toilet Use Score 0   Transfers (Bed/Chair) Score 5   Mobility (Level Surface) Score 0   Stairs Score 0   Barthel Index Score 5    Pt goals to be met in 10 days:  1  Pt will demonstrate good attention and increased arousal to consistently follow one step direction during ADL performance  2  Pt will complete functional transfers to bed, chair, and commode w/ min A x1  3  Pt will complete UBD w/ min A after set- up w/ fair balance  4  Pt will complete LBD w/ min A after set- up  5  Pt will complete grooming to wash face and brush teeth w/ min A and min VC after set- up for encouragement while seated in chair  6  Pt will demonstrate increased activity tolerance and fair sitting balance at EOB for at least 10 minutes during ADL performance  7  Pt will demonstrate good attention and participation in continued evaluation to assess DME needs for discharge from acute care  8   Pt will demonstrate good attention and participation in continued evaluation functional cognitive skills to assist in safe discharge planning  Kiana Yen, OT

## 2017-11-29 NOTE — SUBJECTIVE & OBJECTIVE
VTE Pharmacologic Prophylaxis:   Pharmacologic: Enoxaparin (Lovenox)  Mechanical: Mechanical VTE prophylaxis in place  Patient Centered Rounds: I have performed bedside rounds with nursing staff today  Discussions with Specialists or Other Care Team Provider:    Education and Discussions with Family / Patient:   Time Spent for Care: 20 minutes  More than 50% of total time spent on counseling and coordination of care as described above  Current Length of Stay: 5 day(s)  Current Patient Status: Inpatient   Certification Statement: The patient will continue to require additional inpatient hospital stay due to acute encephalopthy     Discharge Plan: depend on clinical course and placement  Code Status: Level 1 - Full Code    Subjective:   Patient looks sleepy, awake with stimulation  Objective:   Vitals:   Temp (24hrs), Av 4 °F (37 4 °C), Min:99 °F (37 2 °C), Max:99 8 °F (37 7 °C)    HR:  [109-114] 109  Resp:  [18] 18  BP: (136-155)/(64-79) 136/68  SpO2:  [96 %-97 %] 97 %  Body mass index is 23 05 kg/m²  Input and Output Summary (last 24 hours): Intake/Output Summary (Last 24 hours) at 17 1350  Last data filed at 17 1049   Gross per 24 hour   Intake              180 ml   Output             1373 ml   Net            -1193 ml       Physical Exam:     Physical Exam   Constitutional: No distress  Cardiovascular: Normal rate, regular rhythm and normal heart sounds  Exam reveals no gallop and no friction rub  No murmur heard  Pulmonary/Chest: No respiratory distress  She has no wheezes  She has no rales  She exhibits no tenderness  Abdominal: Soft  She exhibits no distension and no mass  There is no tenderness  There is no rebound and no guarding  Neurological: She is alert  She displays normal reflexes  No cranial nerve deficit  She exhibits normal muscle tone  Coordination normal    Sleepy    Skin: Skin is warm  She is not diaphoretic     Psychiatric: She has a normal mood and affect  Additional Data:   Labs:    Results from last 7 days  Lab Units 11/25/17  0508 11/24/17  1314   WBC Thousand/uL 8 09 11 75*   HEMOGLOBIN g/dL 9 2* 11 5   HEMATOCRIT % 29 3* 35 5   PLATELETS Thousands/uL 275 316   NEUTROS PCT %  --  79*   LYMPHS PCT %  --  9*   MONOS PCT %  --  12   EOS PCT %  --  0       Results from last 7 days  Lab Units 11/26/17  0609 11/25/17  0508   SODIUM mmol/L 141 145   POTASSIUM mmol/L 3 5 2 7*   CHLORIDE mmol/L 107 110*   CO2 mmol/L 28 26   BUN mg/dL 7 10   CREATININE mg/dL 0 64 0 73   CALCIUM mg/dL 9 8 9 9   TOTAL PROTEIN g/dL  --  5 8*   BILIRUBIN TOTAL mg/dL  --  0 70   ALK PHOS U/L  --  45*   ALT U/L  --  15   AST U/L  --  21   GLUCOSE RANDOM mg/dL 104 106       Results from last 7 days  Lab Units 11/24/17  1314   INR  0 98       * I Have Reviewed All Lab Data Listed Above  * Additional Pertinent Lab Tests Reviewed: All Labs Within Last 24 Hours Reviewed    Imaging:    Imaging Reports Reviewed Today Include:     Cultures:   Blood Culture:   Lab Results   Component Value Date    BLOODCX No Growth After 4 Days  11/24/2017    BLOODCX No Growth After 4 Days  11/24/2017     Urine Culture: No results found for: URINECX  Sputum Culture: No components found for: SPUTUMCX  Wound Culture: No results found for: WOUNDCULT    Last 24 Hours Medication List:     atorvastatin 20 mg Oral QPM   docusate sodium 100 mg Oral BID   enoxaparin 40 mg Subcutaneous Daily   losartan 100 mg Oral Daily   metoprolol tartrate 25 mg Oral Q12H [address]        Today, Patient Was Seen By: Benito Cr MD    ** Please Note: Dragon 360 Dictation voice to text software may have been used in the creation of this document   **

## 2017-11-30 LAB
BASOPHILS # BLD AUTO: 0.01 THOUSANDS/ΜL (ref 0–0.1)
BASOPHILS NFR BLD AUTO: 0 % (ref 0–1)
EOSINOPHIL # BLD AUTO: 0 THOUSAND/ΜL (ref 0–0.61)
EOSINOPHIL NFR BLD AUTO: 0 % (ref 0–6)
ERYTHROCYTE [DISTWIDTH] IN BLOOD BY AUTOMATED COUNT: 13.7 % (ref 11.6–15.1)
HCT VFR BLD AUTO: 33.9 % (ref 34.8–46.1)
HGB BLD-MCNC: 10.9 G/DL (ref 11.5–15.4)
LYMPHOCYTES # BLD AUTO: 0.8 THOUSANDS/ΜL (ref 0.6–4.47)
LYMPHOCYTES NFR BLD AUTO: 6 % (ref 14–44)
MCH RBC QN AUTO: 27.5 PG (ref 26.8–34.3)
MCHC RBC AUTO-ENTMCNC: 32.2 G/DL (ref 31.4–37.4)
MCV RBC AUTO: 86 FL (ref 82–98)
MONOCYTES # BLD AUTO: 1.88 THOUSAND/ΜL (ref 0.17–1.22)
MONOCYTES NFR BLD AUTO: 14 % (ref 4–12)
NEUTROPHILS # BLD AUTO: 11 THOUSANDS/ΜL (ref 1.85–7.62)
NEUTS SEG NFR BLD AUTO: 80 % (ref 43–75)
PLATELET # BLD AUTO: 467 THOUSANDS/UL (ref 149–390)
PMV BLD AUTO: 10.5 FL (ref 8.9–12.7)
RBC # BLD AUTO: 3.96 MILLION/UL (ref 3.81–5.12)
VIT B1 BLD-SCNC: 52.4 NMOL/L (ref 66.5–200)
WBC # BLD AUTO: 13.69 THOUSAND/UL (ref 4.31–10.16)

## 2017-11-30 PROCEDURE — 92526 ORAL FUNCTION THERAPY: CPT

## 2017-11-30 PROCEDURE — 85025 COMPLETE CBC W/AUTO DIFF WBC: CPT | Performed by: FAMILY MEDICINE

## 2017-11-30 PROCEDURE — 97530 THERAPEUTIC ACTIVITIES: CPT

## 2017-11-30 RX ORDER — METOPROLOL TARTRATE 5 MG/5ML
5 INJECTION INTRAVENOUS EVERY 6 HOURS PRN
Status: DISCONTINUED | OUTPATIENT
Start: 2017-11-30 | End: 2017-12-05 | Stop reason: HOSPADM

## 2017-11-30 RX ORDER — SODIUM CHLORIDE, SODIUM LACTATE, POTASSIUM CHLORIDE, CALCIUM CHLORIDE 600; 310; 30; 20 MG/100ML; MG/100ML; MG/100ML; MG/100ML
75 INJECTION, SOLUTION INTRAVENOUS CONTINUOUS
Status: DISCONTINUED | OUTPATIENT
Start: 2017-11-30 | End: 2017-12-05 | Stop reason: HOSPADM

## 2017-11-30 RX ADMIN — ENOXAPARIN SODIUM 40 MG: 40 INJECTION SUBCUTANEOUS at 09:48

## 2017-11-30 RX ADMIN — METOPROLOL TARTRATE 5 MG: 5 INJECTION, SOLUTION INTRAVENOUS at 00:05

## 2017-11-30 RX ADMIN — METOPROLOL TARTRATE 5 MG: 5 INJECTION, SOLUTION INTRAVENOUS at 21:51

## 2017-11-30 RX ADMIN — SODIUM CHLORIDE, SODIUM LACTATE, POTASSIUM CHLORIDE, AND CALCIUM CHLORIDE 100 ML/HR: .6; .31; .03; .02 INJECTION, SOLUTION INTRAVENOUS at 12:46

## 2017-11-30 RX ADMIN — LABETALOL 20 MG/4 ML (5 MG/ML) INTRAVENOUS SYRINGE 10 MG: at 01:49

## 2017-11-30 NOTE — SUBJECTIVE & OBJECTIVE
VTE Pharmacologic Prophylaxis:   Pharmacologic: Enoxaparin (Lovenox)  Mechanical: Mechanical VTE prophylaxis in place  Patient Centered Rounds: I have performed bedside rounds with nursing staff today  Discussions with Specialists or Other Care Team Provider:   Education and Discussions with Family / Patient: brother  Time Spent for Care: 20 minutes  More than 50% of total time spent on counseling and coordination of care as described above  Current Length of Stay: 6 day(s)  Current Patient Status: Inpatient   Certification Statement: The patient will continue to require additional inpatient hospital stay due to acute encephalopaty     Discharge Plan:depend on clinical course  Code Status: Level 1 - Full Code    Subjective:   Patient awake with pain stimulation  Objective:   Vitals:   Temp (24hrs), Av 9 °F (37 2 °C), Min:98 8 °F (37 1 °C), Max:99 1 °F (37 3 °C)    HR:  [105-115] 108  Resp:  [18-20] 20  BP: (138-184)/(65-90) 138/72  SpO2:  [96 %-98 %] 96 %  Body mass index is 23 05 kg/m²  Input and Output Summary (last 24 hours): Intake/Output Summary (Last 24 hours) at 17 1229  Last data filed at 17 1100   Gross per 24 hour   Intake                0 ml   Output              880 ml   Net             -880 ml       Physical Exam:     Physical Exam   Constitutional: No distress  Neck: Normal range of motion  Neck supple  No JVD present  No tracheal deviation present  No thyromegaly present  Cardiovascular: Normal rate, regular rhythm and normal heart sounds  Exam reveals no gallop  No murmur heard  Pulmonary/Chest: Effort normal and breath sounds normal  No respiratory distress  She has no wheezes  She has no rales  She exhibits no tenderness  Musculoskeletal: She exhibits no edema, tenderness or deformity  Lymphadenopathy:     She has no cervical adenopathy  Neurological: She displays normal reflexes  No cranial nerve deficit   Coordination normal    Awake with stimulation    Skin: She is not diaphoretic  Psychiatric: She has a normal mood and affect  Additional Data:   Labs:    Results from last 7 days  Lab Units 11/29/17  1414   WBC Thousand/uL 12 94*   HEMOGLOBIN g/dL 11 2*   HEMATOCRIT % 34 6*   PLATELETS Thousands/uL 434*   NEUTROS PCT % 78*   LYMPHS PCT % 10*   MONOS PCT % 12   EOS PCT % 0       Results from last 7 days  Lab Units 11/29/17  1414   SODIUM mmol/L 141   POTASSIUM mmol/L 3 8   CHLORIDE mmol/L 102   CO2 mmol/L 30   BUN mg/dL 15   CREATININE mg/dL 0 70   CALCIUM mg/dL 10 3*   TOTAL PROTEIN g/dL 6 8   BILIRUBIN TOTAL mg/dL 0 50   ALK PHOS U/L 66   ALT U/L 13   AST U/L 17   GLUCOSE RANDOM mg/dL 100       Results from last 7 days  Lab Units 11/24/17  1314   INR  0 98       * I Have Reviewed All Lab Data Listed Above  * Additional Pertinent Lab Tests Reviewed: All Labs Within Last 24 Hours Reviewed    Imaging:    Imaging Reports Reviewed Today Include:     Cultures:   Blood Culture:   Lab Results   Component Value Date    BLOODCX No Growth After 5 Days  11/24/2017    BLOODCX No Growth After 5 Days  11/24/2017     Urine Culture: No results found for: URINECX  Sputum Culture: No components found for: SPUTUMCX  Wound Culture: No results found for: WOUNDCULT    Last 24 Hours Medication List:     atorvastatin 20 mg Oral QPM   docusate sodium 100 mg Oral BID   enoxaparin 40 mg Subcutaneous Daily   losartan 100 mg Oral Daily   metoprolol tartrate 25 mg Oral Q12H [address]   multivitamin 10 mL Intravenous Once        Today, Patient Was Seen By: Kiran Pro MD    ** Please Note: Dragon 360 Dictation voice to text software may have been used in the creation of this document   **

## 2017-11-30 NOTE — PLAN OF CARE
Problem: PHYSICAL THERAPY ADULT  Goal: Performs mobility at highest level of function for planned discharge setting  See evaluation for individualized goals  Treatment/Interventions: Functional transfer training, LE strengthening/ROM, Therapeutic exercise, Endurance training, Patient/family training, Equipment eval/education, Bed mobility, Gait training          See flowsheet documentation for full assessment, interventions and recommendations  Outcome: Not Progressing  Prognosis: Fair  Problem List: Decreased strength, Decreased range of motion, Decreased endurance, Impaired balance, Decreased mobility, Decreased coordination, Decreased cognition, Impaired judgement, Decreased safety awareness, Decreased skin integrity, Pain  Assessment: pt awakened few x t/o for session of le therapeutic exercise  maximal A 2 for bed mobility and initially sitting balance edge of bed dependent leaning backward  then, she was able to sit unsupported c displaying r lateral lean and partly backward  she didn't use ue to help support sitting balance c education  pt's speech is difficult to understand  attempt of quick move setup but pt unable to A c ue onto front handle and leans backward,dependent to place feet onto foot plate as pca A her sitting balance for safety  defer transfer to [facility] due to pt's lethargy and alertness o participate  A back to bed A 2 and new linen A c repositioning up in bed bed alarm on scd's on trial of ue brief rom and she c/o pain r wrist ,nurse notified  pt will benefit mechanical means to chair when more awake  she will benefit rehab for d/c  Barriers to Discharge: Inaccessible home environment, Decreased caregiver support     Recommendation: Long-term skilled PT     PT - OK to Discharge: Yes (rehab)    See flowsheet documentation for full assessment

## 2017-11-30 NOTE — SPEECH THERAPY NOTE
Speech Language/Pathology                             SLP  Note  Patient Name: Saundra Sanders  [id number] Date: 11/30/2017       Subjective:  Spoke w/ nsg, pt has been more lethargic since yesterday, now made NPO due to same  Pt's brother at bedside  Objective:  Pt difficult to arouse w/ max verbal and tactile stim, brief eye opening, occas verbalizations-unintelligible  Attempted tsps of puree, pt w/ poor bolus retrieval, no attempt at manipulation or transfer  Max tactile cues w/o success  Bolus suctioned from oral cavity, discussed w/ pt's brother and RN  Assessment:  Pt not appropriate for po  Plan/Recommendations:  Cont NPO-consider alternate means of nutrition  Will cont to follow and reassess for po when more awake and alert

## 2017-11-30 NOTE — PROGRESS NOTES
Progress Note - Iftikhar rTacey 80 y o  female MRN: [id number]    Unit/Bed#: -01 Encounter: [id number]        * Acute encephalopathy   Assessment & Plan    Stable  Improving, it was likely related with acute dehydration, electrolyte imbalance  Hold Risperidon  Waiting for placement        Toenail deformity   Assessment & Plan    Improved after podiatric evaluation        Alzheimer's disease   Assessment & Plan    Progressing/  Patient returning to her baseline it was most likely related with acute and on chronic dementia  Stable  Continue to Hold Risperdal   Prevent aspiration          Peripheral edema   Assessment & Plan    Chronic  Likely secondary to venous insufficiency        HTN (hypertension), benign   Assessment & Plan      Stable  Continue home med        Leukocytosis   Assessment & Plan    No significant elevation, no sign of infection  CXR negative        Hypercalcemia   Assessment & Plan      Improved               VTE Pharmacologic Prophylaxis:   Pharmacologic: Enoxaparin (Lovenox)  Mechanical: Mechanical VTE prophylaxis in place  Patient Centered Rounds: I have performed bedside rounds with nursing staff today  Discussions with Specialists or Other Care Team Provider:   Education and Discussions with Family / Patient: brother  Time Spent for Care: 20 minutes  More than 50% of total time spent on counseling and coordination of care as described above  Current Length of Stay: 6 day(s)  Current Patient Status: Inpatient   Certification Statement: The patient will continue to require additional inpatient hospital stay due to acute encephalopaty     Discharge Plan:depend on clinical course  Code Status: Level 1 - Full Code    Subjective:   Patient awake with pain stimulation       Objective:   Vitals:   Temp (24hrs), Av 9 °F (37 2 °C), Min:98 8 °F (37 1 °C), Max:99 1 °F (37 3 °C)    HR:  [105-115] 108  Resp:  [18-20] 20  BP: (138-184)/(65-90) 138/72  SpO2:  [96 %-98 %] 96 %  Body mass index is 23 05 kg/m²  Input and Output Summary (last 24 hours): Intake/Output Summary (Last 24 hours) at 11/30/17 1229  Last data filed at 11/30/17 1100   Gross per 24 hour   Intake                0 ml   Output              880 ml   Net             -880 ml       Physical Exam:     Physical Exam   Constitutional: No distress  Neck: Normal range of motion  Neck supple  No JVD present  No tracheal deviation present  No thyromegaly present  Cardiovascular: Normal rate, regular rhythm and normal heart sounds  Exam reveals no gallop  No murmur heard  Pulmonary/Chest: Effort normal and breath sounds normal  No respiratory distress  She has no wheezes  She has no rales  She exhibits no tenderness  Musculoskeletal: She exhibits no edema, tenderness or deformity  Lymphadenopathy:     She has no cervical adenopathy  Neurological: She displays normal reflexes  No cranial nerve deficit  Coordination normal    Awake with stimulation    Skin: She is not diaphoretic  Psychiatric: She has a normal mood and affect  Additional Data:   Labs:    Results from last 7 days  Lab Units 11/29/17  1414   WBC Thousand/uL 12 94*   HEMOGLOBIN g/dL 11 2*   HEMATOCRIT % 34 6*   PLATELETS Thousands/uL 434*   NEUTROS PCT % 78*   LYMPHS PCT % 10*   MONOS PCT % 12   EOS PCT % 0       Results from last 7 days  Lab Units 11/29/17  1414   SODIUM mmol/L 141   POTASSIUM mmol/L 3 8   CHLORIDE mmol/L 102   CO2 mmol/L 30   BUN mg/dL 15   CREATININE mg/dL 0 70   CALCIUM mg/dL 10 3*   TOTAL PROTEIN g/dL 6 8   BILIRUBIN TOTAL mg/dL 0 50   ALK PHOS U/L 66   ALT U/L 13   AST U/L 17   GLUCOSE RANDOM mg/dL 100       Results from last 7 days  Lab Units 11/24/17  1314   INR  0 98       * I Have Reviewed All Lab Data Listed Above  * Additional Pertinent Lab Tests Reviewed:  All Labs Within Last 24 Hours Reviewed    Imaging:    Imaging Reports Reviewed Today Include:     Cultures:   Blood Culture:   Lab Results   Component Value Date BLOODCX No Growth After 5 Days  11/24/2017    BLOODCX No Growth After 5 Days  11/24/2017     Urine Culture: No results found for: URINECX  Sputum Culture: No components found for: SPUTUMCX  Wound Culture: No results found for: WOUNDCULT    Last 24 Hours Medication List:     atorvastatin 20 mg Oral QPM   docusate sodium 100 mg Oral BID   enoxaparin 40 mg Subcutaneous Daily   losartan 100 mg Oral Daily   metoprolol tartrate 25 mg Oral Q12H [facility]   multivitamin 10 mL Intravenous Once        Today, Patient Was Seen By: Dakota Quiroz MD    ** Please Note: Dragon 360 Dictation voice to text software may have been used in the creation of this document   **

## 2017-11-30 NOTE — ASSESSMENT & PLAN NOTE
Stable  Improving, it was likely related with acute dehydration, electrolyte imbalance      Hold Risperidon  Waiting for placement

## 2017-11-30 NOTE — PLAN OF CARE
Problem: SLP ADULT - SWALLOWING, IMPAIRED  Goal: Advance to least restrictive diet without signs or symptoms of aspiration for planned discharge setting  See evaluation for individualized goals    Outcome: Not Progressing  NPO due to decrease in mental status

## 2017-11-30 NOTE — PHYSICAL THERAPY NOTE
PHYSICAL THERAPY NOTE          Patient Name: Nicole Del Real  QOUTD'A Date: 11/30/2017 11/30/17 7995   Pain Assessment   Pain Assessment FLACC   Pain Score No Pain   Pain Rating: FLACC (Rest) - Face 0   Pain Rating: FLACC (Rest) - Legs 0   Pain Rating: FLACC (Rest) - Activity 0   Pain Rating: FLACC (Rest) - Cry 0   Pain Rating: FLACC (Rest) - Consolability 0   Score: FLACC (Rest) 0   Pain Rating: FLACC (Activity) - Face 1   Pain Rating: FLACC (Activity) - Legs 1   Pain Rating: FLACC (Activity) - Activity 1   Pain Rating: FLACC (Activity) - Cry 1   Pain Rating: FLACC (Activity) - Consolability 1   Score: FLACC (Activity) 5   Restrictions/Precautions   Other Precautions Cognitive; Bed Alarm; Chair Alarm; Fall Risk;Pain;Aspiration   General   Chart Reviewed Yes   Response to Previous Treatment Patient unable to report, no changes reported from family or staff   Family/Caregiver Present No  (I asked aquiles pca to A of 2 for safety of mobility)   Cognition   Overall Cognitive Status Impaired   Arousal/Participation Lethargic;Persistent stimuli required;Arousable   Attention REBECCA   Orientation Level Oriented to person   Following Commands Unable to follow one step commands   Comments awakened pt for identification of name as I her checked name tag  pt difficult to understand speech but said adi hardwick in time and separatly  unable to say birthdate  Subjective   Subjective pt awakened ,upright in bed  much verbals to keep eyes open  Bed Mobility   Rolling L 2  Maximal assistance   Additional items Assist x 2;Assist x 1; Increased time required;LE management;Verbal cues  (to reposition new underpad pt,incontenent of urine)   Supine to Sit 1  Dependent   Additional items Assist x 2; Increased time required;Verbal cues;LE management;HOB elevated   Sit to Supine 2  Maximal assistance   Additional items Assist x 1;Assist x 2;LE management;Verbal cues;Increased time required;HOB elevated  (A to l sidelying to bed)   Transfers   Sit to Stand Unable to assess   Ambulation/Elevation   Gait pattern Not appropriate   Wheelchair Activities   Wheelchair Cushion Pressure relieving   Balance   Static Sitting Poor -   Dynamic Sitting Poor -  ([id number] edge of bed  lateral trunk flexion,forward/ba)   Endurance Deficit   Endurance Deficit Yes   Activity Tolerance   Activity Tolerance Patient limited by fatigue;Patient limited by pain   Nurse Made Aware spoke c nurse kimmy x 2 and added pt's r wrist painful c few gentle rom  called pca aquiles for A of 2 for safety of mobility   Exercises   Heelslides PROM;15 reps; Supine;Bilateral   Hip Abduction Supine;15 reps;AAROM; Bilateral   Hip Adduction Supine;15 reps;AAROM; Bilateral   Knee AROM Short Arc Quad Supine;15 reps;PROM; Bilateral   Ankle Pumps Supine;15 reps;PROM; Bilateral   Balance Training Sitting;AAROM; Bilateral  (initially dependent sitting then ending sat c r lean,)   Assessment   Prognosis Fair   Problem List Decreased strength;Decreased range of motion;Decreased endurance; Impaired balance;Decreased mobility; Decreased coordination;Decreased cognition; Impaired judgement;Decreased safety awareness;Decreased skin integrity;Pain   Assessment pt awakened few x t/o for session of le therapeutic exercise  maximal A 2 for bed mobility and initially sitting balance edge of bed dependent leaning backward  then, she was able to sit unsupported c displaying r lateral lean and partly backward  she didn't use ue to help support sitting balance c education  pt's speech is difficult to understand  attempt of quick move setup but pt unable to A c ue onto front handle and leans backward,dependent to place feet onto foot plate as pca A her sitting balance for safety  defer transfer to [facility] due to pt's lethargy and alertness o participate  A back to bed A 2 and new linen A c repositioning up in bed bed alarm on scd's on trial of ue brief rom and she c/o pain r wrist ,nurse notified  pt will benefit mechanical means to chair when more awake  she will benefit rehab for d/c   Barriers to Discharge Inaccessible home environment;Decreased caregiver support   Goals   Patient Goals did agreed to return to bed after sitting trial   STG Expiration Date 12/05/17   Treatment Day 2   Plan   Treatment/Interventions Functional transfer training;LE strengthening/ROM; Therapeutic exercise; Endurance training;Patient/family training;Equipment eval/education; Bed mobility; Compensatory technique education;Spoke to nursing  (and pca aquiles)   Progress Slow progress, decreased activity tolerance   PT Frequency 2-3x/wk   Recommendation   Recommendation Long-term skilled PT   Equipment Recommended (will follow as pt progresses)   PT - OK to Discharge Yes  (rehab)

## 2017-11-30 NOTE — PLAN OF CARE
Problem: Nutrition/Hydration-ADULT  Goal: Nutrient/Hydration intake appropriate for improving, restoring or maintaining nutritional needs  Monitor and assess patient's nutrition/hydration status for malnutrition (ex- brittle hair, bruises, dry skin, pale skin and conjunctiva, muscle wasting, smooth red tongue, and disorientation)  Collaborate with interdisciplinary team and initiate plan and interventions as ordered  Monitor patient's weight and dietary intake as ordered or per policy  Utilize nutrition screening tool and intervene per policy  Determine patient's food preferences and provide high-protein, high-caloric foods as appropriate  INTERVENTIONS:  - Monitor oral intake, urinary output, labs, and treatment plans  - Assess nutrition and hydration status and recommend course of action  - Evaluate amount of meals eaten  - Assist patient with eating if necessary   - Allow adequate time for meals  - Recommend/ encourage appropriate diets, oral nutritional supplements, and vitamin/mineral supplements  - Order, calculate, and assess calorie counts as needed  - Recommend, monitor, and adjust tube feedings and TPN/PPN based on assessed needs  - Assess need for intravenous fluids  - Provide specific nutrition/hydration education as appropriate  - Include patient/family/caregiver in decisions related to nutrition   Outcome: Not Progressing    Noted ST recommendations - pt not appropriate for po intake at this time, NPO status  May need to consider alternate means of nutrition pending plan of care  Nutrition Services will continue to follow and make recommendations as indicated  Pt at high nutrition risk at this time

## 2017-11-30 NOTE — PLAN OF CARE
Problem: DISCHARGE PLANNING - CARE MANAGEMENT  Goal: Discharge to post-acute care or home with appropriate resources  INTERVENTIONS:  - Conduct assessment to determine patient/family and health care team treatment goals, and need for post-acute services based on payer coverage, community resources, and patient preferences, and barriers to discharge  - Address psychosocial, clinical, and financial barriers to discharge as identified in assessment in conjunction with the patient/family and health care team  - Arrange appropriate level of post-acute services according to patients   needs and preference and payer coverage in collaboration with the physician and health care team  - Communicate with and update the patient/family, physician, and health care team regarding progress on the discharge plan  - Arrange appropriate transportation to post-acute venues   Outcome: Progressing  TC to Neisha at [phone] with [facility] to discuss if pt would be a target admission for SNF  Check pt's admission in March 2017 which was for psych and not dementia  Pt was admitted for psychosis and had hallucinations  Neisha stated the pt would need to be option  CM will f/u with paper work  Neisha was all the paper fax at one time to [phone]

## 2017-11-30 NOTE — ASSESSMENT & PLAN NOTE
Progressing/  Patient returning to her baseline it was most likely related with acute and on chronic dementia  Stable  Continue to Hold Risperdal   Prevent aspiration

## 2017-11-30 NOTE — PLAN OF CARE
Problem: PHYSICAL THERAPY ADULT  Goal: Performs mobility at highest level of function for planned discharge setting  See evaluation for individualized goals  Treatment/Interventions: Functional transfer training, LE strengthening/ROM, Therapeutic exercise, Endurance training, Patient/family training, Equipment eval/education, Bed mobility, Gait training          See flowsheet documentation for full assessment, interventions and recommendations  Outcome: Not Progressing  Prognosis: Fair  Problem List: Decreased strength, Decreased range of motion, Decreased endurance, Impaired balance, Decreased mobility, Decreased coordination, Decreased cognition, Impaired judgement, Decreased safety awareness, Decreased skin integrity, Pain  Assessment: pt awakened few x t/o for session of le therapeutic exercise  maximal A 2 for bed mobility and initially sitting balance edge of bed dependent leaning backward  then, she was able to sit unsupported c displaying r lateral lean and partly backward  she didn't use ue to help support sitting balance c education  pt's speech is difficult to understand  attempt of quick move setup but pt unable to A c ue onto front handle and leans backward,dependent to place feet onto foot plate as pca A her sitting balance for safety  defer transfer to [facility] due to pt's lethargy and alertness o participate  A back to bed A 2 and new linen A c repositioning up in bed bed alarm on scd's on trial of ue brief rom and she c/o pain r wrist ,nurse notified  pt will benefit mechanical means to chair when more awake  she will benefit rehab for d/c  Barriers to Discharge: Inaccessible home environment, Decreased caregiver support     Recommendation: Long-term skilled PT     PT - OK to Discharge: Yes (rehab)    See flowsheet documentation for full assessment

## 2017-12-01 ENCOUNTER — APPOINTMENT (INPATIENT)
Dept: RADIOLOGY | Facility: HOSPITAL | Age: 82
DRG: 643 | End: 2017-12-01
Payer: COMMERCIAL

## 2017-12-01 PROBLEM — E46 PROTEIN-CALORIE MALNUTRITION (HCC): Status: ACTIVE | Noted: 2017-12-01

## 2017-12-01 PROCEDURE — 74000 HB X-RAY EXAM OF ABDOMEN (SINGLE ANTEROPOSTERIOR VIEW): CPT

## 2017-12-01 RX ADMIN — Medication 2 SPRAY: at 12:46

## 2017-12-01 RX ADMIN — SODIUM CHLORIDE, SODIUM LACTATE, POTASSIUM CHLORIDE, AND CALCIUM CHLORIDE 75 ML/HR: .6; .31; .03; .02 INJECTION, SOLUTION INTRAVENOUS at 03:47

## 2017-12-01 NOTE — PROGRESS NOTES
Jevity 1 2 continuous tube feed started at 20 cc  See provider order  SLIM paged to discuss need for flushes being that there is no order for at this time  SLIM states no need for free water flush at this time  Will continue to monitor patient, call bell within reach  Tube feed start was double checked with 2nd RN  Family remains at bedside

## 2017-12-01 NOTE — ASSESSMENT & PLAN NOTE
Stable  Improving, it was likely related with acute dehydration, electrolyte imbalance    Now on NG tube for nutrition   Hold Risperidon  Waiting for placement

## 2017-12-01 NOTE — PROGRESS NOTES
Progress Note - Mann Marroquin 80 y o  female MRN: [id number]    Unit/Bed#: -01 Encounter: [id number]        * Acute encephalopathy   Assessment & Plan    Stable  Improving, it was likely related with acute dehydration, electrolyte imbalance  Now on NG tube for nutrition   Hold Risperidon  Waiting for placement        Protein-calorie malnutrition ([facility] [address] )   Assessment & Plan      Now on NG tube  Follow up nutrition recommendation        Toenail deformity   Assessment & Plan    Improved after podiatric evaluation        Alzheimer's disease   Assessment & Plan    Progressing/  Patient returning to her baseline it was most likely related with acute and on chronic dementia  Stable  Continue to Hold Risperdal   Prevent aspiration          Hypokalemia   Assessment & Plan    Follow BMP        Peripheral edema   Assessment & Plan    Chronic  Likely secondary to venous insufficiency        HTN (hypertension), benign   Assessment & Plan      Stable  Continue home med        Leukocytosis   Assessment & Plan    No significant elevation, no sign of infection  CXR negative        Hypercalcemia   Assessment & Plan      Improved               VTE Pharmacologic Prophylaxis:   Pharmacologic: Enoxaparin (Lovenox)  Mechanical: Mechanical VTE prophylaxis in place  Patient Centered Rounds: I have performed bedside rounds with nursing staff today  Discussions with Specialists or Other Care Team Provider: Gastroenterology  Education and Discussions with Family / Patient: daughter, friends  Time Spent for Care: 30 minutes  More than 50% of total time spent on counseling and coordination of care as described above      Current Length of Stay: 7 day(s)  Current Patient Status: Inpatient   Certification Statement: The patient will continue to require additional inpatient hospital stay due to acute encephalopathy    Discharge Plan: depend on clinical course  Code Status: Level 1 - Full Code    Subjective:   Patient was found more awake, talking today, non understanding verbal sound     Objective:   Vitals:   Temp (24hrs), Av 8 °F (37 1 °C), Min:98 6 °F (37 °C), Max:98 9 °F (37 2 °C)    HR:  [] 104  Resp:  [18-19] 18  BP: (140-174)/(72-77) 140/72  SpO2:  [96 %-98 %] 98 %  Body mass index is 23 05 kg/m²  Input and Output Summary (last 24 hours): Intake/Output Summary (Last 24 hours) at 17 1357  Last data filed at 17 1300   Gross per 24 hour   Intake             1000 ml   Output              818 ml   Net              182 ml       Physical Exam:     Physical Exam   Constitutional: No distress  Cardiovascular: Normal rate and normal heart sounds  Exam reveals no gallop  No murmur heard  Pulmonary/Chest: Effort normal and breath sounds normal  No respiratory distress  She has no wheezes  She has no rales  She exhibits no tenderness  Abdominal: Soft  Bowel sounds are normal  She exhibits no distension  There is no tenderness  Musculoskeletal: She exhibits no edema or tenderness  Neurological: She is alert  She displays normal reflexes  No cranial nerve deficit  Coordination normal    Skin: Skin is dry  She is not diaphoretic  Psychiatric: She has a normal mood and affect  Additional Data:   Labs:    Results from last 7 days  Lab Units 17  1357   WBC Thousand/uL 13 69*   HEMOGLOBIN g/dL 10 9*   HEMATOCRIT % 33 9*   PLATELETS Thousands/uL 467*   NEUTROS PCT % 80*   LYMPHS PCT % 6*   MONOS PCT % 14*   EOS PCT % 0       Results from last 7 days  Lab Units 17  1414   SODIUM mmol/L 141   POTASSIUM mmol/L 3 8   CHLORIDE mmol/L 102   CO2 mmol/L 30   BUN mg/dL 15   CREATININE mg/dL 0 70   CALCIUM mg/dL 10 3*   TOTAL PROTEIN g/dL 6 8   BILIRUBIN TOTAL mg/dL 0 50   ALK PHOS U/L 66   ALT U/L 13   AST U/L 17   GLUCOSE RANDOM mg/dL 100           * I Have Reviewed All Lab Data Listed Above  * Additional Pertinent Lab Tests Reviewed:  All Labs Within Last 24 Hours Reviewed    Imaging:    Imaging Reports Reviewed Today Include:     Cultures:   Blood Culture:   Lab Results   Component Value Date    BLOODCX No Growth After 5 Days  11/24/2017    BLOODCX No Growth After 5 Days  11/24/2017     Urine Culture: No results found for: URINECX  Sputum Culture: No components found for: SPUTUMCX  Wound Culture: No results found for: WOUNDCULT    Last 24 Hours Medication List:     atorvastatin 20 mg Oral QPM   docusate sodium 100 mg Oral BID   enoxaparin 40 mg Subcutaneous Daily   losartan 100 mg Oral Daily   metoprolol tartrate 25 mg Oral Q12H [address]   multivitamin-minerals 1 tablet Oral Once        Today, Patient Was Seen By: Steph Albarado MD    ** Please Note: Dragon 360 Dictation voice to text software may have been used in the creation of this document   **

## 2017-12-01 NOTE — PROGRESS NOTES
Residual of NG tube checked  5 cc of residual withdrawn and returned  Jevity 1 2 continuous increased by 10 cc per order from SLIM  Now infusing at 30 cc  Will continue to monitor

## 2017-12-01 NOTE — PROGRESS NOTES
SPD called to get tube feed pump brought up to patient's room  Spoke with SLIM in regards to order and what to start Jevity 1 2 at (see order)  Awaiting Jevity 1 2 from kitchen  Will continue to monitor patient, call bell within reach  Family at the bedside

## 2017-12-01 NOTE — SUBJECTIVE & OBJECTIVE
VTE Pharmacologic Prophylaxis:   Pharmacologic: Enoxaparin (Lovenox)  Mechanical: Mechanical VTE prophylaxis in place  Patient Centered Rounds: I have performed bedside rounds with nursing staff today  Discussions with Specialists or Other Care Team Provider: Gastroenterology  Education and Discussions with Family / Patient: daughter, friends  Time Spent for Care: 30 minutes  More than 50% of total time spent on counseling and coordination of care as described above  Current Length of Stay: 7 day(s)  Current Patient Status: Inpatient   Certification Statement: The patient will continue to require additional inpatient hospital stay due to acute encephalopathy    Discharge Plan: depend on clinical course  Code Status: Level 1 - Full Code    Subjective:   Patient was found more awake, talking today, non understanding verbal sound     Objective:   Vitals:   Temp (24hrs), Av 8 °F (37 1 °C), Min:98 6 °F (37 °C), Max:98 9 °F (37 2 °C)    HR:  [] 104  Resp:  [18-19] 18  BP: (140-174)/(72-77) 140/72  SpO2:  [96 %-98 %] 98 %  Body mass index is 23 05 kg/m²  Input and Output Summary (last 24 hours): Intake/Output Summary (Last 24 hours) at 17 1357  Last data filed at 17 1300   Gross per 24 hour   Intake             1000 ml   Output              818 ml   Net              182 ml       Physical Exam:     Physical Exam   Constitutional: No distress  Cardiovascular: Normal rate and normal heart sounds  Exam reveals no gallop  No murmur heard  Pulmonary/Chest: Effort normal and breath sounds normal  No respiratory distress  She has no wheezes  She has no rales  She exhibits no tenderness  Abdominal: Soft  Bowel sounds are normal  She exhibits no distension  There is no tenderness  Musculoskeletal: She exhibits no edema or tenderness  Neurological: She is alert  She displays normal reflexes  No cranial nerve deficit  Coordination normal    Skin: Skin is dry  She is not diaphoretic  Psychiatric: She has a normal mood and affect  Additional Data:   Labs:    Results from last 7 days  Lab Units 11/30/17  1357   WBC Thousand/uL 13 69*   HEMOGLOBIN g/dL 10 9*   HEMATOCRIT % 33 9*   PLATELETS Thousands/uL 467*   NEUTROS PCT % 80*   LYMPHS PCT % 6*   MONOS PCT % 14*   EOS PCT % 0       Results from last 7 days  Lab Units 11/29/17  1414   SODIUM mmol/L 141   POTASSIUM mmol/L 3 8   CHLORIDE mmol/L 102   CO2 mmol/L 30   BUN mg/dL 15   CREATININE mg/dL 0 70   CALCIUM mg/dL 10 3*   TOTAL PROTEIN g/dL 6 8   BILIRUBIN TOTAL mg/dL 0 50   ALK PHOS U/L 66   ALT U/L 13   AST U/L 17   GLUCOSE RANDOM mg/dL 100           * I Have Reviewed All Lab Data Listed Above  * Additional Pertinent Lab Tests Reviewed: All Labs Within Last 24 Hours Reviewed    Imaging:    Imaging Reports Reviewed Today Include:     Cultures:   Blood Culture:   Lab Results   Component Value Date    BLOODCX No Growth After 5 Days  11/24/2017    BLOODCX No Growth After 5 Days  11/24/2017     Urine Culture: No results found for: URINECX  Sputum Culture: No components found for: SPUTUMCX  Wound Culture: No results found for: WOUNDCULT    Last 24 Hours Medication List:     atorvastatin 20 mg Oral QPM   docusate sodium 100 mg Oral BID   enoxaparin 40 mg Subcutaneous Daily   losartan 100 mg Oral Daily   metoprolol tartrate 25 mg Oral Q12H [address]   multivitamin-minerals 1 tablet Oral Once        Today, Patient Was Seen By: Josefina Peguero MD    ** Please Note: Dragon 360 Dictation voice to text software may have been used in the creation of this document   **

## 2017-12-01 NOTE — PLAN OF CARE
Problem: Potential for Falls  Goal: Patient will remain free of falls  INTERVENTIONS:  - Assess patient frequently for physical needs  -  Identify cognitive and physical deficits and behaviors that affect risk of falls    -  [place] fall precautions as indicated by assessment   - Educate patient/family on patient safety including physical limitations  - Instruct patient to call for assistance with activity based on assessment  - Modify environment to reduce risk of injury  - Consider OT/PT consult to assist with strengthening/mobility   Outcome: Progressing      Problem: Prexisting or High Potential for Compromised Skin Integrity  Goal: Skin integrity is maintained or improved  INTERVENTIONS:  - Identify patients at risk for skin breakdown  - Assess and monitor skin integrity  - Assess and monitor nutrition and hydration status  - Monitor labs (i e  albumin)  - Assess for incontinence   - Turn and reposition patient  - Assist with mobility/ambulation  - Relieve pressure over bony prominences  - Avoid friction and shearing  - Provide appropriate hygiene as needed including keeping skin clean and dry  - Evaluate need for skin moisturizer/barrier cream  - Collaborate with interdisciplinary team (i e  Nutrition, Rehabilitation, etc )   - Patient/family teaching   Outcome: Progressing      Problem: SAFETY,RESTRAINT: NV/NON-SELF DESTRUCTIVE BEHAVIOR  Goal: Remains free of harm/injury (restraint for non violent/non self-detsructive behavior)  INTERVENTIONS:  - Instruct patient/family regarding restraint use   - Assess and monitor physiologic and psychological status   - Provide interventions and comfort measures to meet assessed patient needs   - Identify and implement measures to help patient regain control  - Assess readiness for release of restraint    Outcome: Progressing    Goal: Returns to optimal restraint-free functioning  INTERVENTIONS:  - Assess the patient's behavior and symptoms that indicate continued need for restraint  - Identify and implement measures to help patient regain control  - Assess readiness for release of restraint    Outcome: Progressing      Problem: Nutrition/Hydration-ADULT  Goal: Nutrient/Hydration intake appropriate for improving, restoring or maintaining nutritional needs  Monitor and assess patient's nutrition/hydration status for malnutrition (ex- brittle hair, bruises, dry skin, pale skin and conjunctiva, muscle wasting, smooth red tongue, and disorientation)  Collaborate with interdisciplinary team and initiate plan and interventions as ordered  Monitor patient's weight and dietary intake as ordered or per policy  Utilize nutrition screening tool and intervene per policy  Determine patient's food preferences and provide high-protein, high-caloric foods as appropriate       INTERVENTIONS:  - Monitor oral intake, urinary output, labs, and treatment plans  - Assess nutrition and hydration status and recommend course of action  - Evaluate amount of meals eaten  - Assist patient with eating if necessary   - Allow adequate time for meals  - Recommend/ encourage appropriate diets, oral nutritional supplements, and vitamin/mineral supplements  - Order, calculate, and assess calorie counts as needed  - Recommend, monitor, and adjust tube feedings and TPN/PPN based on assessed needs  - Assess need for intravenous fluids  - Provide specific nutrition/hydration education as appropriate  - Include patient/family/caregiver in decisions related to nutrition   Outcome: Progressing      Problem: DISCHARGE PLANNING - CARE MANAGEMENT  Goal: Discharge to post-acute care or home with appropriate resources  INTERVENTIONS:  - Conduct assessment to determine patient/family and health care team treatment goals, and need for post-acute services based on payer coverage, community resources, and patient preferences, and barriers to discharge  - Address psychosocial, clinical, and financial barriers to discharge as identified in assessment in conjunction with the patient/family and health care team  - Arrange appropriate level of post-acute services according to patients   needs and preference and payer coverage in collaboration with the physician and health care team  - Communicate with and update the patient/family, physician, and health care team regarding progress on the discharge plan  - Arrange appropriate transportation to post-acute venues   Outcome: Progressing

## 2017-12-01 NOTE — CONSULTS
Consultation - [facility]  Izzy Brooks 80 y o  female MRN: [id number]  Unit/Bed#: -01 Encounter: [id number]        Consults    Reason for Consult / Principal Problem: malnutrition, lack of oral intake    ASSESSMENT and PLAN:    Principal Problem:    Acute encephalopathy  Active Problems:    Hypercalcemia    Leukocytosis    HTN (hypertension), benign    Peripheral edema    HLD (hyperlipidemia)    Hypokalemia    Alzheimer's disease    Toenail deformity    #1  Malnutrition and lack of oral intake secondary to encephalopathy in Alzheimer's patient: encephalopathy likely secondary to acute dehydration and electrolyte imbalance  Seems slightly improved today but still not alert enough to safely tolerated PO intake    -I discussed extensively with patient's daughter and other family members  Today patient has had a slight improvement  It appears that she was eating normally at home prior to admission however she was living alone so it is unclear how well she was doing with this  We discussed the different options  At this time we will place a Keofed tube and do tube feedings over the weekend to allow the patient a few more days for her mental status to improve enough to tolerate an oral diet  We will re-evaluate on Monday and decide if we need to move further with a discussion in regards to a PEG tube if patient is not improved from mental status standpoint  I did discuss with the family that in the future she may require a PEG tube due to her underlying dementia  I discussed the procedure and how we place the PEG tube as well as the risks of it   -throat spray prior to care fed tube placement  -KUB to confirm placement in the stomach  -awaiting nutrition recommendations for tube feed type and rate    I did speak with them this morning   -------------------------------------------------------------------------------------------------------------------    HPI:  This is an 80year-old female with a history of hypertension, hyperlipidemia, and Alzheimer's disease who presented to the emergency room originally about a week ago due to acute encephalopathy which was thought to be secondary to dehydration and electrolyte imbalances  The patient has been slow to respond from a mental status standpoint  The family does report that she is a bit better today compared to yesterday  She has not been able to safely tolerate an oral diet for a few days  According to the family they believe that she was eating normally about 90% of the time at home however she was living alone so it is unclear how reliable this is  She has had multiple abdominal surgeries including a gallbladder removal, appendix removal, and hysterectomy       REVIEW OF SYSTEMS:  Unable to be performed due to underlying dementia       Historical Information   Past Medical History:   Diagnosis Date    Anxiety     Depression     HLD (hyperlipidemia) 11/24/2017    HTN (hypertension), benign 11/24/2017    Peripheral edema 11/24/2017    Psychiatric illness     Psychosis      Past Surgical History:   Procedure Laterality Date    APPENDECTOMY      CEREBRAL ANEURYSM REPAIR      HYSTERECTOMY       Social History   History   Alcohol Use No     History   Drug Use No     History   Smoking Status    Former Smoker   Smokeless Tobacco    Never Used     Family History   Problem Relation Age of Onset    No Known Problems Mother     No Known Problems Father     No Known Problems Sister     No Known Problems Brother     No Known Problems Maternal Aunt     No Known Problems Paternal Aunt     No Known Problems Maternal Uncle     No Known Problems Paternal Uncle     No Known Problems Maternal Grandfather     No Known Problems Maternal Grandmother     No Known Problems Paternal Grandfather     No Known Problems Paternal Grandmother     No Known Problems Cousin     ADD / ADHD Neg Hx     Alcohol abuse Neg Hx     Anxiety disorder Neg Hx  Bipolar disorder Neg Hx     Dementia Neg Hx     Depression Neg Hx     Drug abuse Neg Hx     OCD Neg Hx     Paranoid behavior Neg Hx     Schizophrenia Neg Hx     Seizures Neg Hx     Self-Injurious Behavior  Neg Hx     Suicide Attempts Neg Hx        Meds/Allergies     Prescriptions Prior to Admission   Medication    atorvastatin (LIPITOR) 20 mg tablet    furosemide (LASIX) 20 mg tablet    losartan (COZAAR) 100 MG tablet    metoprolol tartrate (LOPRESSOR) 25 mg tablet    potassium chloride (K-DUR,KLOR-CON) 10 mEq tablet    risperiDONE (RisperDAL) 1 mg tablet    risperiDONE (RisperDAL) 0 5 mg tablet     Current Facility-Administered Medications   Medication Dose Route Frequency    acetaminophen (TYLENOL) tablet 650 mg  650 mg Oral Q6H PRN    atorvastatin (LIPITOR) tablet 20 mg  20 mg Oral QPM    docusate sodium (COLACE) capsule 100 mg  100 mg Oral BID    enoxaparin (LOVENOX) subcutaneous injection 40 mg  40 mg Subcutaneous Daily    labetalol (NORMODYNE) injection 10 mg  10 mg Intravenous Q4H PRN    lactated ringers infusion  75 mL/hr Intravenous Continuous    losartan (COZAAR) tablet 100 mg  100 mg Oral Daily    metoprolol (LOPRESSOR) injection 5 mg  5 mg Intravenous Q6H PRN    metoprolol tartrate (LOPRESSOR) tablet 25 mg  25 mg Oral Q12H [place]    multivitamin-minerals (CENTRUM) tablet 1 tablet  1 tablet Oral Once    ondansetron (ZOFRAN) injection 4 mg  4 mg Intravenous Q6H PRN    polyvinyl alcohol (LIQUIFILM TEARS) 1 4 % ophthalmic solution 1 drop  1 drop Both Eyes Q3H PRN       Allergies   Allergen Reactions    Dilantin [Phenytoin]            Objective     Blood pressure 140/72, pulse 104, temperature 98 9 °F (37 2 °C), temperature source Rectal, resp  rate 18, height 5' 4" (1 626 m), weight 60 9 kg (134 lb 4 2 oz), SpO2 98 %        Intake/Output Summary (Last 24 hours) at 12/01/17 1256  Last data filed at 12/01/17 0900   Gross per 24 hour   Intake             1000 ml   Output 918 ml   Net               82 ml         PHYSICAL EXAM:      General Appearance:   Patient opens eyes occasionally and is speaking small phrases today, cooperative, no distress, appears stated age    HEENT:   Normocephalic, atraumatic, anicteric, no oropharyngeal thrush present      Neck:  Supple, symmetrical, trachea midline, no adenopathy;    thyroid: no enlargement/tenderness/nodules; no carotid  bruit or JVD    Lungs:   Clear to auscultation bilaterally; no rales, rhonchi or wheezing; respirations unlabored    Heart[de-identified]   S1 and S2 normal; regular rate and rhythm; no murmur, rub, or gallop  Abdomen:   Soft, non-tender, non-distended; normal bowel sounds; no masses, no organomegaly    Genitalia:   Deferred    Rectal:   Deferred    Extremities:  No cyanosis, clubbing or edema    Pulses:  2+ and symmetric all extremities    Skin:  Skin color, texture, turgor normal, no rashes or lesions    Lymph nodes:  No palpable cervical, axillary or inguinal lymphadenopathy        Lab Results:     Results from last 7 days  Lab Units 11/30/17  1357   WBC Thousand/uL 13 69*   HEMOGLOBIN g/dL 10 9*   HEMATOCRIT % 33 9*   PLATELETS Thousands/uL 467*   NEUTROS PCT % 80*   LYMPHS PCT % 6*   MONOS PCT % 14*   EOS PCT % 0       Results from last 7 days  Lab Units 11/29/17  1414   SODIUM mmol/L 141   POTASSIUM mmol/L 3 8   CHLORIDE mmol/L 102   CO2 mmol/L 30   BUN mg/dL 15   CREATININE mg/dL 0 70   CALCIUM mg/dL 10 3*   TOTAL PROTEIN g/dL 6 8   BILIRUBIN TOTAL mg/dL 0 50   ALK PHOS U/L 66   ALT U/L 13   AST U/L 17   GLUCOSE RANDOM mg/dL 100       Results from last 7 days  Lab Units 11/24/17  1314   INR  0 98           Imaging Studies: I have personally reviewed pertinent imaging studies  Xr Chest Portable    Result Date: 11/29/2017  Impression: Enlargement of cardiac silhouette  No acute pulmonary disease  Workstation performed: [id number]     Xr Chest 2 Views    Result Date: 11/24/2017  Impression: No active pulmonary disease  Workstation performed: [id number]     Ct Head Without Contrast    Result Date: 11/24/2017  Impression: No acute intracranial hemorrhage seen  No CT signs of acute territorial infarction Stable CT Workstation performed: [id number]           Patient was seen and examined by Dr Mary Del Cid  All [facility] medical decisions were made by Dr Mary Del Cid  Thank you for allowing us to participate in the care of this present patient  We will follow-up with you closely

## 2017-12-01 NOTE — PROGRESS NOTES
XRAY at bedside with SLIM to verify placement  ICU nurses called to come back to [facility] to removed wire  Will continue to monitor patient, call bell within reach  Family at the bedside

## 2017-12-01 NOTE — NUTRITION
Suggest initiate Jevity 1 2 @ 20 ml hr increasing by 10 ml every 4 hours to goal rate of 50 ml/hr  Goal rate provides 1440 kcals, 66 6 g protein, 972 ml free water from formula     Suggest 135 ml flush at goal if no IV running

## 2017-12-01 NOTE — PLAN OF CARE
Problem: DISCHARGE PLANNING - CARE MANAGEMENT  Goal: Discharge to post-acute care or home with appropriate resources  INTERVENTIONS:  - Conduct assessment to determine patient/family and health care team treatment goals, and need for post-acute services based on payer coverage, community resources, and patient preferences, and barriers to discharge  - Address psychosocial, clinical, and financial barriers to discharge as identified in assessment in conjunction with the patient/family and health care team  - Arrange appropriate level of post-acute services according to patients   needs and preference and payer coverage in collaboration with the physician and health care team  - Communicate with and update the patient/family, physician, and health care team regarding progress on the discharge plan  - Arrange appropriate transportation to post-acute venues   Outcome: Progressing  LOS 7  Not a bundle; Not a readmission  Pt will need to have Level II Assessment completed prior to going to rehab or SNF  MAYANK DURÁN-51 and clinical documentation has been completed and faxed to [facility]  Cm will continue to follow to assist with DCP

## 2017-12-01 NOTE — SPEECH THERAPY NOTE
Attempted to see pt for dysphagia treatment today, however RN reports pt remains too lethargic for evaluation  Pt remains NPO  May need to consider alternate means of nutrition  Speech will continue to follow

## 2017-12-02 ENCOUNTER — APPOINTMENT (INPATIENT)
Dept: RADIOLOGY | Facility: HOSPITAL | Age: 82
DRG: 643 | End: 2017-12-02
Payer: COMMERCIAL

## 2017-12-02 LAB
BASOPHILS # BLD MANUAL: 0 THOUSAND/UL (ref 0–0.1)
BASOPHILS NFR MAR MANUAL: 0 % (ref 0–1)
EOSINOPHIL # BLD MANUAL: 0 THOUSAND/UL (ref 0–0.4)
EOSINOPHIL NFR BLD MANUAL: 0 % (ref 0–6)
ERYTHROCYTE [DISTWIDTH] IN BLOOD BY AUTOMATED COUNT: 14 % (ref 11.6–15.1)
HCT VFR BLD AUTO: 32.1 % (ref 34.8–46.1)
HGB BLD-MCNC: 10 G/DL (ref 11.5–15.4)
LYMPHOCYTES # BLD AUTO: 0.96 THOUSAND/UL (ref 0.6–4.47)
LYMPHOCYTES # BLD AUTO: 8 % (ref 14–44)
MCH RBC QN AUTO: 27.1 PG (ref 26.8–34.3)
MCHC RBC AUTO-ENTMCNC: 31.2 G/DL (ref 31.4–37.4)
MCV RBC AUTO: 87 FL (ref 82–98)
MONOCYTES # BLD AUTO: 1.2 THOUSAND/UL (ref 0–1.22)
MONOCYTES NFR BLD: 10 % (ref 4–12)
NEUTROPHILS # BLD MANUAL: 9.86 THOUSAND/UL (ref 1.85–7.62)
NEUTS SEG NFR BLD AUTO: 82 % (ref 43–75)
PLATELET # BLD AUTO: 440 THOUSANDS/UL (ref 149–390)
PLATELET BLD QL SMEAR: ABNORMAL
PMV BLD AUTO: 9.8 FL (ref 8.9–12.7)
RBC # BLD AUTO: 3.69 MILLION/UL (ref 3.81–5.12)
TOTAL CELLS COUNTED SPEC: 100
WBC # BLD AUTO: 12.02 THOUSAND/UL (ref 4.31–10.16)

## 2017-12-02 PROCEDURE — 85027 COMPLETE CBC AUTOMATED: CPT | Performed by: FAMILY MEDICINE

## 2017-12-02 PROCEDURE — 71010 HB CHEST X-RAY 1 VIEW FRONTAL: CPT

## 2017-12-02 PROCEDURE — 85007 BL SMEAR W/DIFF WBC COUNT: CPT | Performed by: FAMILY MEDICINE

## 2017-12-02 PROCEDURE — 92526 ORAL FUNCTION THERAPY: CPT

## 2017-12-02 RX ADMIN — HYDROMORPHONE HYDROCHLORIDE 0.2 MG: 1 INJECTION, SOLUTION INTRAMUSCULAR; INTRAVENOUS; SUBCUTANEOUS at 14:52

## 2017-12-02 RX ADMIN — ENOXAPARIN SODIUM 40 MG: 40 INJECTION SUBCUTANEOUS at 08:18

## 2017-12-02 RX ADMIN — LOSARTAN POTASSIUM 100 MG: 50 TABLET ORAL at 08:19

## 2017-12-02 RX ADMIN — METOPROLOL TARTRATE 25 MG: 25 TABLET, FILM COATED ORAL at 08:19

## 2017-12-02 NOTE — PROGRESS NOTES
NG tube with residual of 5 ml  Tube feeing of Jevity 1 2 increased to goal of 50 ml/hr  Will continue to monitor

## 2017-12-02 NOTE — PROGRESS NOTES
5    Progress Note - Ganesh Albarran 80 y o  female MRN: [id number]    Unit/Bed#: -01 Encounter: [id number]        * Acute encephalopathy   Assessment & Plan    Stable  Improving, it was likely related with acute dehydration, electrolyte imbalance  Now on NG tube for nutrition  We will follow up progression patient might need PEG tube  Hold Risperidon  Waiting for placement        Toenail deformity   Assessment & Plan    Improved after podiatric evaluation        Alzheimer's disease   Assessment & Plan    Progressing/  Patient returning to her baseline it was most likely related with acute and on chronic dementia  Stable  Continue to Hold Risperdal   Prevent aspiration          Hypokalemia   Assessment & Plan    resolved        HLD (hyperlipidemia)   Assessment & Plan    Cont Statin        Peripheral edema   Assessment & Plan    Chronic  Likely secondary to venous insufficiency        HTN (hypertension), benign   Assessment & Plan      Stable  Continue home med        Leukocytosis   Assessment & Plan    No significant elevation, no sign of infection  CXR negative  Follow up CBC         Hypercalcemia   Assessment & Plan      Improved               VTE Pharmacologic Prophylaxis:   Pharmacologic: Enoxaparin (Lovenox)  Mechanical: Mechanical VTE prophylaxis in place  Patient Centered Rounds: I have performed bedside rounds with nursing staff today  Discussions with Specialists or Other Care Team Provider:   Education and Discussions with Family / Lauri Varela  Time Spent for Care: 20 minutes  More than 50% of total time spent on counseling and coordination of care as described above      Current Length of Stay: 8 day(s)  Current Patient Status: Inpatient   Certification Statement: The patient will continue to require additional inpatient hospital stay due to acute encephalopathy     Discharge Plan: depend on clinical course and placement   Code Status: Level 1 - Full Code    Subjective:   Patient mumbling sounds, without acute distress    Objective:   Vitals:   Temp (24hrs), Av 9 °F (37 2 °C), Min:98 4 °F (36 9 °C), Max:99 3 °F (37 4 °C)    HR:  [] 103  Resp:  [16-18] 18  BP: (137-159)/(60-82) 137/60  SpO2:  [96 %-100 %] 96 %  Body mass index is 23 05 kg/m²  Input and Output Summary (last 24 hours): Intake/Output Summary (Last 24 hours) at 17 1248  Last data filed at 17 0900   Gross per 24 hour   Intake              220 ml   Output             1012 ml   Net             -792 ml       Physical Exam:     Physical Exam   Constitutional: She is oriented to person, place, and time  No distress  NG in place   Neck: Normal range of motion  Neck supple  No JVD present  No tracheal deviation present  No thyromegaly present  Cardiovascular: Normal rate, regular rhythm and normal heart sounds  Exam reveals no gallop and no friction rub  No murmur heard  Pulmonary/Chest: Effort normal  No respiratory distress  She has no wheezes  She has no rales  She exhibits no tenderness  Abdominal: Soft  Bowel sounds are normal  She exhibits no distension and no mass  There is no tenderness  There is no rebound and no guarding  Musculoskeletal: She exhibits no edema, tenderness or deformity  Lymphadenopathy:     She has no cervical adenopathy  Neurological: She is alert and oriented to person, place, and time  She has normal reflexes  She displays normal reflexes  No cranial nerve deficit  She exhibits normal muscle tone  Coordination normal    Skin: Skin is warm  She is not diaphoretic  Psychiatric: She has a normal mood and affect         Additional Data:   Labs:    Results from last 7 days  Lab Units 17  1357   WBC Thousand/uL 13 69*   HEMOGLOBIN g/dL 10 9*   HEMATOCRIT % 33 9*   PLATELETS Thousands/uL 467*   NEUTROS PCT % 80*   LYMPHS PCT % 6*   MONOS PCT % 14*   EOS PCT % 0       Results from last 7 days  Lab Units 17  1414   SODIUM mmol/L 141   POTASSIUM mmol/L 3 8   CHLORIDE mmol/L 102   CO2 mmol/L 30   BUN mg/dL 15   CREATININE mg/dL 0 70   CALCIUM mg/dL 10 3*   TOTAL PROTEIN g/dL 6 8   BILIRUBIN TOTAL mg/dL 0 50   ALK PHOS U/L 66   ALT U/L 13   AST U/L 17   GLUCOSE RANDOM mg/dL 100           * I Have Reviewed All Lab Data Listed Above  * Additional Pertinent Lab Tests Reviewed: All Labs Within Last 24 Hours Reviewed    Imaging:    Imaging Reports Reviewed Today Include:     Cultures:   Blood Culture:   Lab Results   Component Value Date    BLOODCX No Growth After 5 Days  11/24/2017    BLOODCX No Growth After 5 Days  11/24/2017     Urine Culture: No results found for: URINECX  Sputum Culture: No components found for: SPUTUMCX  Wound Culture: No results found for: WOUNDCULT    Last 24 Hours Medication List:     atorvastatin 20 mg Oral QPM   docusate sodium 100 mg Oral BID   enoxaparin 40 mg Subcutaneous Daily   losartan 100 mg Oral Daily   metoprolol tartrate 25 mg Oral Q12H [address]   multivitamin-minerals 1 tablet Oral Once        Today, Patient Was Seen By: Paula Man MD    ** Please Note: Dragon 360 Dictation voice to text software may have been used in the creation of this document   **

## 2017-12-02 NOTE — PROGRESS NOTES
While ICU RN was placing NG tube patient daughter became increasing upset  Yelling at the ICU RN to try the left side of the nose, and kept yelling about having to have another x-ray to determine location pt had a total of 2 x-rays during placement  Patients daughter refused further placement stating, "I don't her touching my mother (refering to the ICU RN)  We are going to have to wait until tomorrow " I attempted to speak to the daughter she did not want to hear about any other options, the Supervisor and Charge made aware  SLIM made aware, holding NG placement until tomorrow

## 2017-12-02 NOTE — PROGRESS NOTES
While preforming zander care, patients NG tube was accidentally removed  NG tube intact  Patient had no sign of discomfort or pain during the incident  SLIM made aware a new order was placed to have NG tube replaced  Patient daughter at bedside, and had incident explained  Patient daughter had no concerns or questions at this time  Continue to monitor patient at this time

## 2017-12-02 NOTE — ASSESSMENT & PLAN NOTE
Stable  Improving, it was likely related with acute dehydration, electrolyte imbalance  Now on NG tube for nutrition   We will follow up progression patient might need PEG tube  Hold Risperidon  Waiting for placement

## 2017-12-02 NOTE — SPEECH THERAPY NOTE
Speech Language/Pathology                             SLP  Note  Patient Name: Bertha Esquivel  MNVQU'E Date: 12/2/2017       Subjective:  Pt seen for dysphagia tx  dtr present at bedside  keofed on place, tf running  Pt more awake and alert  Verbalizing, some mumbling speech  Objective:  Pt stated she was hungry, but did not retrieve bolus from spoon, small amt placed on tongue  Pt w/o awareness of bolus, no attempt to manipulate or transfer  Pt cont talking w/ applesauce in mouth  Applesauce began leaking out L side of mouth  Max verbal and tactile cues given to transfer and swallow bolus  Bolus eventually suctioned from oral cavity  Discussed swallowing and high risk for aspiration w/ po w/ dtr  Dtr attempted to feed pt small amt of HTL, w/ same results as puree  HTL suctioned from oral cavity  Discussed w/ follow up mon 12/4 to reassess ability to take po    Assessment:  Pt not appropriate for po- severe oral dysphagia due to decreased awareness of bolus in oral cavity       Plan/Recommendations:  Cont NPO, cont NGT  Will re-assess mon 12/4

## 2017-12-02 NOTE — PROGRESS NOTES
NG tube has residual of 9 ml  Tube feeding of Jevity 1 2 increased to 40 ml/hr  Will continue to monitor

## 2017-12-02 NOTE — PLAN OF CARE
Problem: SLP ADULT - SWALLOWING, IMPAIRED  Goal: Advance to least restrictive diet without signs or symptoms of aspiration for planned discharge setting  See evaluation for individualized goals    Outcome: Not Progressing

## 2017-12-02 NOTE — SUBJECTIVE & OBJECTIVE
VTE Pharmacologic Prophylaxis:   Pharmacologic: Enoxaparin (Lovenox)  Mechanical: Mechanical VTE prophylaxis in place  Patient Centered Rounds: I have performed bedside rounds with nursing staff today  Discussions with Specialists or Other Care Team Provider:   Education and Discussions with Family / Shanice Santana  Time Spent for Care: 20 minutes  More than 50% of total time spent on counseling and coordination of care as described above  Current Length of Stay: 8 day(s)  Current Patient Status: Inpatient   Certification Statement: The patient will continue to require additional inpatient hospital stay due to acute encephalopathy     Discharge Plan: depend on clinical course and placement   Code Status: Level 1 - Full Code    Subjective:   Patient mumbling sounds, without acute distress    Objective:   Vitals:   Temp (24hrs), Av 9 °F (37 2 °C), Min:98 4 °F (36 9 °C), Max:99 3 °F (37 4 °C)    HR:  [] 103  Resp:  [16-18] 18  BP: (137-159)/(60-82) 137/60  SpO2:  [96 %-100 %] 96 %  Body mass index is 23 05 kg/m²  Input and Output Summary (last 24 hours): Intake/Output Summary (Last 24 hours) at 17 1248  Last data filed at 17 0900   Gross per 24 hour   Intake              220 ml   Output             1012 ml   Net             -792 ml       Physical Exam:     Physical Exam   Constitutional: She is oriented to person, place, and time  No distress  NG in place   Neck: Normal range of motion  Neck supple  No JVD present  No tracheal deviation present  No thyromegaly present  Cardiovascular: Normal rate, regular rhythm and normal heart sounds  Exam reveals no gallop and no friction rub  No murmur heard  Pulmonary/Chest: Effort normal  No respiratory distress  She has no wheezes  She has no rales  She exhibits no tenderness  Abdominal: Soft  Bowel sounds are normal  She exhibits no distension and no mass  There is no tenderness  There is no rebound and no guarding  Musculoskeletal: She exhibits no edema, tenderness or deformity  Lymphadenopathy:     She has no cervical adenopathy  Neurological: She is alert and oriented to person, place, and time  She has normal reflexes  She displays normal reflexes  No cranial nerve deficit  She exhibits normal muscle tone  Coordination normal    Skin: Skin is warm  She is not diaphoretic  Psychiatric: She has a normal mood and affect  Additional Data:   Labs:    Results from last 7 days  Lab Units 11/30/17  1357   WBC Thousand/uL 13 69*   HEMOGLOBIN g/dL 10 9*   HEMATOCRIT % 33 9*   PLATELETS Thousands/uL 467*   NEUTROS PCT % 80*   LYMPHS PCT % 6*   MONOS PCT % 14*   EOS PCT % 0       Results from last 7 days  Lab Units 11/29/17  1414   SODIUM mmol/L 141   POTASSIUM mmol/L 3 8   CHLORIDE mmol/L 102   CO2 mmol/L 30   BUN mg/dL 15   CREATININE mg/dL 0 70   CALCIUM mg/dL 10 3*   TOTAL PROTEIN g/dL 6 8   BILIRUBIN TOTAL mg/dL 0 50   ALK PHOS U/L 66   ALT U/L 13   AST U/L 17   GLUCOSE RANDOM mg/dL 100           * I Have Reviewed All Lab Data Listed Above  * Additional Pertinent Lab Tests Reviewed: All Labs Within Last 24 Hours Reviewed    Imaging:    Imaging Reports Reviewed Today Include:     Cultures:   Blood Culture:   Lab Results   Component Value Date    BLOODCX No Growth After 5 Days  11/24/2017    BLOODCX No Growth After 5 Days  11/24/2017     Urine Culture: No results found for: URINECX  Sputum Culture: No components found for: SPUTUMCX  Wound Culture: No results found for: WOUNDCULT    Last 24 Hours Medication List:     atorvastatin 20 mg Oral QPM   docusate sodium 100 mg Oral BID   enoxaparin 40 mg Subcutaneous Daily   losartan 100 mg Oral Daily   metoprolol tartrate 25 mg Oral Q12H [address]   multivitamin-minerals 1 tablet Oral Once        Today, Patient Was Seen By: Araceli James MD    ** Please Note: Dragon 360 Dictation voice to text software may have been used in the creation of this document   **

## 2017-12-03 ENCOUNTER — APPOINTMENT (INPATIENT)
Dept: RADIOLOGY | Facility: HOSPITAL | Age: 82
DRG: 643 | End: 2017-12-03
Payer: COMMERCIAL

## 2017-12-03 PROCEDURE — 92526 ORAL FUNCTION THERAPY: CPT

## 2017-12-03 PROCEDURE — 74000 HB X-RAY EXAM OF ABDOMEN (SINGLE ANTEROPOSTERIOR VIEW): CPT

## 2017-12-03 RX ADMIN — SODIUM CHLORIDE, SODIUM LACTATE, POTASSIUM CHLORIDE, AND CALCIUM CHLORIDE 75 ML/HR: .6; .31; .03; .02 INJECTION, SOLUTION INTRAVENOUS at 09:54

## 2017-12-03 NOTE — SPEECH THERAPY NOTE
Speech Language/Pathology                             SLP  Note  Patient Name: Cate Carter  VCBUG'V Date: 12/3/2017    Subjective:  Pt in bed  Asleep  Daughter present  Pt minimally arousable with max stimulation by SLP and daughter  Eyes open briefly  Single incomprehensible speech attempt  Unable to report/deny pain  Objective:  Provided oral stim with use of sponge swab  Pt with no active response to oral stim  Cough noted after oral stim which did lead to pt eliciting a spontaneous swallow  Unable to stimulate additional swallows on command or with increased oral stim  Small amounts of HTL placed on lower lip and in anterior oral cavity by SLP with max cues to swallow  Pt with no awareness of bolus  Absent oral response  Bolus wiped from lip and suctioned from oral cavity with Ira  Assessment:  Pt remains inappropriate for oral diet due to severe oral dysphagia; current lethargy and fluctuating mental status also impacting swallow safety  Plan/Recommendations:  Keep pt NPO  SLP to f/u 12/4

## 2017-12-03 NOTE — PROGRESS NOTES
Daughter at bedside, requesting pt see speech today to evaluate  Speech tried to evaluate pt, pt too lethargic to have evaluation  Speech therapist will evaluate again tomorrow

## 2017-12-03 NOTE — PROGRESS NOTES
Progress Note - Saundra Sanders 80 y o  female MRN: [id number]    Unit/Bed#: -01 Encounter: [id number]        * Acute encephalopathy   Assessment & Plan    Stable  Improving, it was likely related with acute dehydration, electrolyte imbalance  Now on NG tube for nutrition  We will follow up progression patient might need PEG tube  Hold Risperidon  Waiting for placement        Protein-calorie malnutrition ([address] )   Assessment & Plan    Off of NG tube after it went off  Follow up nutrition recommendation        Toenail deformity   Assessment & Plan    Improved after podiatric evaluation        Alzheimer's disease   Assessment & Plan    Progressing/  Patient returning to her baseline it was most likely related with acute and on chronic dementia  We will consider palliative care  Stable  Continue to Hold Risperdal   Prevent aspiration          Leukocytosis   Assessment & Plan    No significant elevation, no sign of infection  CXR negative  Follow up CBC         Hypercalcemia   Assessment & Plan      Improved               VTE Pharmacologic Prophylaxis:   Pharmacologic: Enoxaparin (Lovenox)  Mechanical: Mechanical VTE prophylaxis in place  Patient Centered Rounds: I have performed bedside rounds with nursing staff today  Discussions with Specialists or Other Care Team Provider:   Education and Discussions with Family / Patient: daughter  Time Spent for Care: 20 minutes  More than 50% of total time spent on counseling and coordination of care as described above      Current Length of Stay: 9 day(s)  Current Patient Status: Inpatient   Certification Statement: The patient will continue to require additional inpatient hospital stay due to acute encephalopathy    Discharge Plan: depend on clinical course and placement   Code Status: Level 1 - Full Code    Subjective:   Awake, no oriented x3, no acute distress    Objective:   Vitals:   Temp (24hrs), Av 6 °F (37 °C), Min:98 4 °F (36 9 °C), Max:98 9 °F (37 2 °C)    HR:  [] 105  Resp:  [16-18] 16  BP: (121-175)/(57-77) 123/61  SpO2:  [91 %-98 %] 95 %  Body mass index is 23 05 kg/m²  Input and Output Summary (last 24 hours): Intake/Output Summary (Last 24 hours) at 12/03/17 1210  Last data filed at 12/03/17 1114   Gross per 24 hour   Intake             1000 ml   Output             1770 ml   Net             -770 ml       Physical Exam:     Physical Exam   Constitutional: No distress  Cardiovascular: Normal rate and regular rhythm  Exam reveals no gallop and no friction rub  No murmur heard  Pulmonary/Chest: No respiratory distress  She has no wheezes  She has no rales  She exhibits no tenderness  Abdominal: Bowel sounds are normal  She exhibits no distension and no mass  There is no tenderness  There is no rebound and no guarding  Musculoskeletal: She exhibits no edema, tenderness or deformity  Neurological:   Awake, no orientedx3   Skin: Skin is warm  She is not diaphoretic  Psychiatric: She has a normal mood and affect  Additional Data:   Labs:    Results from last 7 days  Lab Units 12/02/17  1342 11/30/17  1357   WBC Thousand/uL 12 02* 13 69*   HEMOGLOBIN g/dL 10 0* 10 9*   HEMATOCRIT % 32 1* 33 9*   PLATELETS Thousands/uL 440* 467*   NEUTROS PCT %  --  80*   LYMPHS PCT %  --  6*   LYMPHO PCT % 8*  --    MONOS PCT %  --  14*   MONO PCT MAN % 10  --    EOS PCT %  --  0   EOSINO PCT MANUAL % 0  --        Results from last 7 days  Lab Units 11/29/17  1414   SODIUM mmol/L 141   POTASSIUM mmol/L 3 8   CHLORIDE mmol/L 102   CO2 mmol/L 30   BUN mg/dL 15   CREATININE mg/dL 0 70   CALCIUM mg/dL 10 3*   TOTAL PROTEIN g/dL 6 8   BILIRUBIN TOTAL mg/dL 0 50   ALK PHOS U/L 66   ALT U/L 13   AST U/L 17   GLUCOSE RANDOM mg/dL 100           * I Have Reviewed All Lab Data Listed Above  * Additional Pertinent Lab Tests Reviewed:  All Labs Within Last 24 Hours Reviewed    Imaging:    Imaging Reports Reviewed Today Include:     Cultures:   Blood Culture: Lab Results   Component Value Date    BLOODCX No Growth After 5 Days  11/24/2017    BLOODCX No Growth After 5 Days  11/24/2017     Urine Culture: No results found for: URINECX  Sputum Culture: No components found for: SPUTUMCX  Wound Culture: No results found for: WOUNDCULT    Last 24 Hours Medication List:     atorvastatin 20 mg Oral QPM   docusate sodium 100 mg Oral BID   enoxaparin 40 mg Subcutaneous Daily   losartan 100 mg Oral Daily   metoprolol tartrate 25 mg Oral Q12H [address]   multivitamin-minerals 1 tablet Oral Once        Today, Patient Was Seen By: Liliane Faith MD    ** Please Note: Dragon 360 Dictation voice to text software may have been used in the creation of this document   **

## 2017-12-03 NOTE — ASSESSMENT & PLAN NOTE
Progressing/  Patient returning to her baseline it was most likely related with acute and on chronic dementia   We will consider palliative care  Stable  Continue to Hold Risperdal   Prevent aspiration

## 2017-12-03 NOTE — SUBJECTIVE & OBJECTIVE
VTE Pharmacologic Prophylaxis:   Pharmacologic: Enoxaparin (Lovenox)  Mechanical: Mechanical VTE prophylaxis in place  Patient Centered Rounds: I have performed bedside rounds with nursing staff today  Discussions with Specialists or Other Care Team Provider:   Education and Discussions with Family / Patient: daughter  Time Spent for Care: 20 minutes  More than 50% of total time spent on counseling and coordination of care as described above  Current Length of Stay: 9 day(s)  Current Patient Status: Inpatient   Certification Statement: The patient will continue to require additional inpatient hospital stay due to acute encephalopathy    Discharge Plan: depend on clinical course and placement   Code Status: Level 1 - Full Code    Subjective:   Awake, no oriented x3, no acute distress    Objective:   Vitals:   Temp (24hrs), Av 6 °F (37 °C), Min:98 4 °F (36 9 °C), Max:98 9 °F (37 2 °C)    HR:  [] 105  Resp:  [16-18] 16  BP: (121-175)/(57-77) 123/61  SpO2:  [91 %-98 %] 95 %  Body mass index is 23 05 kg/m²  Input and Output Summary (last 24 hours): Intake/Output Summary (Last 24 hours) at 17 1210  Last data filed at 17 1114   Gross per 24 hour   Intake             1000 ml   Output             1770 ml   Net             -770 ml       Physical Exam:     Physical Exam   Constitutional: No distress  Cardiovascular: Normal rate and regular rhythm  Exam reveals no gallop and no friction rub  No murmur heard  Pulmonary/Chest: No respiratory distress  She has no wheezes  She has no rales  She exhibits no tenderness  Abdominal: Bowel sounds are normal  She exhibits no distension and no mass  There is no tenderness  There is no rebound and no guarding  Musculoskeletal: She exhibits no edema, tenderness or deformity  Neurological:   Awake, no orientedx3   Skin: Skin is warm  She is not diaphoretic  Psychiatric: She has a normal mood and affect         Additional Data: Labs:    Results from last 7 days  Lab Units 12/02/17  1342 11/30/17  1357   WBC Thousand/uL 12 02* 13 69*   HEMOGLOBIN g/dL 10 0* 10 9*   HEMATOCRIT % 32 1* 33 9*   PLATELETS Thousands/uL 440* 467*   NEUTROS PCT %  --  80*   LYMPHS PCT %  --  6*   LYMPHO PCT % 8*  --    MONOS PCT %  --  14*   MONO PCT MAN % 10  --    EOS PCT %  --  0   EOSINO PCT MANUAL % 0  --        Results from last 7 days  Lab Units 11/29/17  1414   SODIUM mmol/L 141   POTASSIUM mmol/L 3 8   CHLORIDE mmol/L 102   CO2 mmol/L 30   BUN mg/dL 15   CREATININE mg/dL 0 70   CALCIUM mg/dL 10 3*   TOTAL PROTEIN g/dL 6 8   BILIRUBIN TOTAL mg/dL 0 50   ALK PHOS U/L 66   ALT U/L 13   AST U/L 17   GLUCOSE RANDOM mg/dL 100           * I Have Reviewed All Lab Data Listed Above  * Additional Pertinent Lab Tests Reviewed: All Labs Within Last 24 Hours Reviewed    Imaging:    Imaging Reports Reviewed Today Include:     Cultures:   Blood Culture:   Lab Results   Component Value Date    BLOODCX No Growth After 5 Days  11/24/2017    BLOODCX No Growth After 5 Days  11/24/2017     Urine Culture: No results found for: URINECX  Sputum Culture: No components found for: SPUTUMCX  Wound Culture: No results found for: WOUNDCULT    Last 24 Hours Medication List:     atorvastatin 20 mg Oral QPM   docusate sodium 100 mg Oral BID   enoxaparin 40 mg Subcutaneous Daily   losartan 100 mg Oral Daily   metoprolol tartrate 25 mg Oral Q12H [facility]   multivitamin-minerals 1 tablet Oral Once        Today, Patient Was Seen By: Yasmine Marc MD    ** Please Note: Dragon 360 Dictation voice to text software may have been used in the creation of this document   **

## 2017-12-03 NOTE — PROGRESS NOTES
Progress Note - Tereso Woods 80 y o  female MRN: [id number]    Unit/Bed#: -Joe Encounter: [id number]        Subjective: The patient is unable to provide any significant history  She does have dementia  The NG tube came out yesterday  Repeated attempts were unsuccessful  She has not been able to see speech and swallow since she went to sleep  Objective:     Vitals: Blood pressure 159/79, pulse 95, temperature 98 8 °F (37 1 °C), temperature source Oral, resp  rate 16, height 5' 4" (1 626 m), weight 60 9 kg (134 lb 4 2 oz), SpO2 95 %  ,Body mass index is 23 05 kg/m²  Intake/Output Summary (Last 24 hours) at 12/03/17 1846  Last data filed at 12/03/17 1708   Gross per 24 hour   Intake             1000 ml   Output             1705 ml   Net             -705 ml       Physical Exam:     General Appearance:  Awake and somewhat answering questions but does have dementia  Appears cachectic  Loss of supraclaviclar fat pads  Lungs: To auscultation    Clear  Heart: Regular rate and rhythm, S1, S2 normal, no murmur, click, rub or gallop  Abdomen: Soft, non-tender, non-distended; bowel sounds normal; no masses or no organomegaly  Extremities: No cyanosis, edema    Invasive Devices     Peripheral Intravenous Line            Peripheral IV 12/02/17 Left Wrist 1 day          Drain            NG/OG/Enteral Tube Nasogastric Right nares 2 days                Lab Results:      Results from last 7 days  Lab Units 12/02/17  1342 11/30/17  1357   WBC Thousand/uL 12 02* 13 69*   HEMOGLOBIN g/dL 10 0* 10 9*   HEMATOCRIT % 32 1* 33 9*   PLATELETS Thousands/uL 440* 467*   NEUTROS PCT %  --  80*   LYMPHS PCT %  --  6*   LYMPHO PCT % 8*  --    MONOS PCT %  --  14*   MONO PCT MAN % 10  --    EOS PCT %  --  0   EOSINO PCT MANUAL % 0  --        Results from last 7 days  Lab Units 11/29/17  1414   SODIUM mmol/L 141   POTASSIUM mmol/L 3 8   CHLORIDE mmol/L 102   CO2 mmol/L 30   BUN mg/dL 15   CREATININE mg/dL 0 70   CALCIUM mg/dL 10 3*   TOTAL PROTEIN g/dL 6 8   BILIRUBIN TOTAL mg/dL 0 50   ALK PHOS U/L 66   ALT U/L 13   AST U/L 17   GLUCOSE RANDOM mg/dL 100               Imaging Studies: I have personally reviewed pertinent imaging studies  Xr Chest Portable    Result Date: 11/29/2017  Impression: Enlargement of cardiac silhouette  No acute pulmonary disease  Workstation performed: [id number]     Xr Chest 2 Views    Result Date: 11/24/2017  Impression: No active pulmonary disease  Workstation performed: [id number]     Ct Head Without Contrast    Result Date: 11/24/2017  Impression: No acute intracranial hemorrhage seen  No CT signs of acute territorial infarction Stable CT Workstation performed: [id number]       ASSESMENT/ PLAN:     1  Dementia and dysphagia  Discussed at length approximately 25 minutes with the family in regards to her medical condition and the futility of doing a PEG tube  She does have advanced dementia along the with the care of herself suggested by supraclavicular fat and wasting in temporal wasting  The family however would like to think about this and at this time have requested placement of the NG feeding tube  I also recommended a palliative care consultation  The daughter would like to think about that  I suggested that with her medical condition is very unlikely that the PEG tube would offer any quality or quantity of life that would be meaningful  They did seem to acknowledge  However the would like to see her response with the NG feeding tube placement  We will re-evaluate her tomorrow to see how she responds to the [facility] and also get the daughters opinion in regards to eventual PEG tube placement  An NG tube was passed through the right nares into the stomach  Good positioning was ascertained by imaging study  I discussed with the primary team to remove the inner stylet and to start her on tube feeds  Continue with aspiration precautions

## 2017-12-04 PROCEDURE — 97530 THERAPEUTIC ACTIVITIES: CPT

## 2017-12-04 PROCEDURE — 92526 ORAL FUNCTION THERAPY: CPT

## 2017-12-04 PROCEDURE — 97110 THERAPEUTIC EXERCISES: CPT

## 2017-12-04 RX ADMIN — ATORVASTATIN CALCIUM 20 MG: 20 TABLET, FILM COATED ORAL at 18:38

## 2017-12-04 RX ADMIN — SODIUM CHLORIDE, SODIUM LACTATE, POTASSIUM CHLORIDE, AND CALCIUM CHLORIDE 75 ML/HR: .6; .31; .03; .02 INJECTION, SOLUTION INTRAVENOUS at 23:30

## 2017-12-04 RX ADMIN — METOPROLOL TARTRATE 25 MG: 25 TABLET, FILM COATED ORAL at 18:38

## 2017-12-04 NOTE — SUBJECTIVE & OBJECTIVE
VTE Pharmacologic Prophylaxis:   Pharmacologic: Enoxaparin (Lovenox)  Mechanical: Mechanical VTE prophylaxis in place  Patient Centered Rounds: I have performed bedside rounds with nursing staff today  Discussions with Specialists or Other Care Team Provider: gastroenterology   Education and Discussions with Family / Patient: daughter   Time Spent for Care: 20 minutes  More than 50% of total time spent on counseling and coordination of care as described above  Current Length of Stay: 10 day(s)  Current Patient Status: Inpatient   Certification Statement: The patient will continue to require additional inpatient hospital stay due to acute encephalopathy    Discharge Plan: Depend on clinical course  Code Status: Level 1 - Full Code    Subjective:   Patient found alert, awake, without acute distress    Objective:   Vitals:   Temp (24hrs), Av 6 °F (37 °C), Min:97 6 °F (36 4 °C), Max:99 4 °F (37 4 °C)    HR:  [] 103  Resp:  [16-18] 18  BP: (136-163)/(66-79) 136/66  SpO2:  [95 %-97 %] 97 %  Body mass index is 23 05 kg/m²  Input and Output Summary (last 24 hours): Intake/Output Summary (Last 24 hours) at 17 1249  Last data filed at 17 1237   Gross per 24 hour   Intake              250 ml   Output             1968 ml   Net            -1718 ml       Physical Exam:     Physical Exam   Constitutional: No distress  Cardiovascular: Normal rate, regular rhythm and normal heart sounds  Exam reveals no gallop  No murmur heard  Pulmonary/Chest: Effort normal and breath sounds normal  No respiratory distress  She has no wheezes  She has no rales  She exhibits no tenderness  Abdominal: Soft  She exhibits no distension and no mass  There is no tenderness  There is no rebound and no guarding  Musculoskeletal: Normal range of motion  She exhibits no edema, tenderness or deformity  Neurological: She is alert  Awake, mumbling sounds    Skin: Skin is warm  She is not diaphoretic  Psychiatric: She has a normal mood and affect  Additional Data:   Labs:    Results from last 7 days  Lab Units 12/02/17  1342 11/30/17  1357   WBC Thousand/uL 12 02* 13 69*   HEMOGLOBIN g/dL 10 0* 10 9*   HEMATOCRIT % 32 1* 33 9*   PLATELETS Thousands/uL 440* 467*   NEUTROS PCT %  --  80*   LYMPHS PCT %  --  6*   LYMPHO PCT % 8*  --    MONOS PCT %  --  14*   MONO PCT MAN % 10  --    EOS PCT %  --  0   EOSINO PCT MANUAL % 0  --        Results from last 7 days  Lab Units 11/29/17  1414   SODIUM mmol/L 141   POTASSIUM mmol/L 3 8   CHLORIDE mmol/L 102   CO2 mmol/L 30   BUN mg/dL 15   CREATININE mg/dL 0 70   CALCIUM mg/dL 10 3*   TOTAL PROTEIN g/dL 6 8   BILIRUBIN TOTAL mg/dL 0 50   ALK PHOS U/L 66   ALT U/L 13   AST U/L 17   GLUCOSE RANDOM mg/dL 100           * I Have Reviewed All Lab Data Listed Above  * Additional Pertinent Lab Tests Reviewed: All Labs Within Last 24 Hours Reviewed    Imaging:    Imaging Reports Reviewed Today Include:     Cultures:   Blood Culture:   Lab Results   Component Value Date    BLOODCX No Growth After 5 Days  11/24/2017    BLOODCX No Growth After 5 Days  11/24/2017     Urine Culture: No results found for: URINECX  Sputum Culture: No components found for: SPUTUMCX  Wound Culture: No results found for: WOUNDCULT    Last 24 Hours Medication List:     atorvastatin 20 mg Oral QPM   docusate sodium 100 mg Oral BID   enoxaparin 40 mg Subcutaneous Daily   losartan 100 mg Oral Daily   metoprolol tartrate 25 mg Oral Q12H [address]   multivitamin-minerals 1 tablet Oral Once        Today, Patient Was Seen By: Cheryl Song MD    ** Please Note: Dragon 360 Dictation voice to text software may have been used in the creation of this document   **

## 2017-12-04 NOTE — PLAN OF CARE
Problem: DISCHARGE PLANNING - CARE MANAGEMENT  Goal: Discharge to post-acute care or home with appropriate resources  INTERVENTIONS:  - Conduct assessment to determine patient/family and health care team treatment goals, and need for post-acute services based on payer coverage, community resources, and patient preferences, and barriers to discharge  - Address psychosocial, clinical, and financial barriers to discharge as identified in assessment in conjunction with the patient/family and health care team  - Arrange appropriate level of post-acute services according to patient's   needs and preference and payer coverage in collaboration with the physician and health care team  - Communicate with and update the patient/family, physician, and health care team regarding progress on the discharge plan  - Arrange appropriate transportation to post-acute venues    Outcome: Progressing  CM left a message for Ms Mcconnell Estuardo stating the facility is requesting a letter explaining pt's Alzheimer's is the primary diagnosis  Cm will continue to follow

## 2017-12-04 NOTE — PLAN OF CARE
Problem: DISCHARGE PLANNING - CARE MANAGEMENT  Goal: Discharge to post-acute care or home with appropriate resources  INTERVENTIONS:  - Conduct assessment to determine patient/family and health care team treatment goals, and need for post-acute services based on payer coverage, community resources, and patient preferences, and barriers to discharge  - Address psychosocial, clinical, and financial barriers to discharge as identified in assessment in conjunction with the patient/family and health care team  - Arrange appropriate level of post-acute services according to patient's   needs and preference and payer coverage in collaboration with the physician and health care team  - Communicate with and update the patient/family, physician, and health care team regarding progress on the discharge plan  - Arrange appropriate transportation to post-acute venues    Outcome: Progressing  Cm contacted liaison for [facility] who stated the facility would be in agreement to take pt without the Level II  They are requesting a letter from Ms Hodges Fredy stating that the Alzheimer's is the primary diagnosis  Insurance auth from [facility] will be needed for admission to rehab  PT has not seen pt recently ad will need to see pt in order to submit for auth  Cm contacted PT/OT who will see pt as soon as possible  CM will then send notes to [facility] to submit for auth  Cm will continue to follow to assist with needs and DCP

## 2017-12-04 NOTE — PROGRESS NOTES
Progress Note - Nathan Fisher 80 y o  female MRN: [id number]    Unit/Bed#: -Joe Encounter: [id number]    Assessment and Plan:   Principal Problem:    Acute encephalopathy  Active Problems:    Hypercalcemia    Leukocytosis    HTN (hypertension), benign    Peripheral edema    HLD (hyperlipidemia)    Hypokalemia    Alzheimer's disease    Toenail deformity    Protein-calorie malnutrition ([address] )    #1  Malnutrition and dysphagia secondary to Alzheimer's dementia: patient passed swallow eval today for puree with honey thick liquids  She is still high risk for aspiration    -discussed with patient's daughter and speech therapy  Patient passed swallow evaluation for pureed diet with honey thick liquids however she is still high risk for aspiration and needs to be closely monitored  Aspiration precautions must be used and patient must be fed by nursing staff  We informed the patient's daughter that she is still high risk and she consented to allowing her mom to have an oral diet despite the risk  We did explain the concern that the patient will not be able to take in enough by mouth to sustain her nutrition  I did discuss with her that if her mom does not progress with the oral diet, that we will then need to speak about a more long-term solution such as a PEG tube versus comfort measures pending which route they would like to go  She has been on NG tube feedings twice now and this is not a long term solution  -we will D/C the Keofed tube and tube feedings   -Ensure pudding nutrition supplement  -At this point, we will sign off  Please call if patient is not progressing and family would like peg tube placement    ----------------------------------------------------------------------------------------------------------------    Subjective:     Patient is more alert today   Passed swallow eval today with puree and honey thick liquids but still high risk for aspiration    Objective:     Vitals: Blood pressure 136/66, pulse 103, temperature 97 6 °F (36 4 °C), temperature source Oral, resp  rate 18, height 5' 4" (1 626 m), weight 60 9 kg (134 lb 4 2 oz), SpO2 97 %  ,Body mass index is 23 05 kg/m²  Intake/Output Summary (Last 24 hours) at 12/04/17 1306  Last data filed at 12/04/17 1237   Gross per 24 hour   Intake              250 ml   Output             1968 ml   Net            -1718 ml       Physical Exam:     General Appearance: Alert, appears stated age and cooperative; thin and cachectic  Lungs: Clear to auscultation bilaterally, no rales or rhonchi, no labored breathing/accessory muscle use  Heart: Regular rate and rhythm, S1, S2 normal, no murmur, click, rub or gallop  Abdomen: Soft, non-tender, non-distended; bowel sounds normal; no masses or no organomegaly  Extremities: No cyanosis, clubbing, or edema    Invasive Devices     Peripheral Intravenous Line            Peripheral IV 12/02/17 Left Wrist 2 days                Lab Results:    Results from last 7 days  Lab Units 12/02/17  1342 11/30/17  1357   WBC Thousand/uL 12 02* 13 69*   HEMOGLOBIN g/dL 10 0* 10 9*   HEMATOCRIT % 32 1* 33 9*   PLATELETS Thousands/uL 440* 467*   NEUTROS PCT %  --  80*   LYMPHS PCT %  --  6*   LYMPHO PCT % 8*  --    MONOS PCT %  --  14*   MONO PCT MAN % 10  --    EOS PCT %  --  0   EOSINO PCT MANUAL % 0  --        Results from last 7 days  Lab Units 11/29/17  1414   SODIUM mmol/L 141   POTASSIUM mmol/L 3 8   CHLORIDE mmol/L 102   CO2 mmol/L 30   BUN mg/dL 15   CREATININE mg/dL 0 70   CALCIUM mg/dL 10 3*   TOTAL PROTEIN g/dL 6 8   BILIRUBIN TOTAL mg/dL 0 50   ALK PHOS U/L 66   ALT U/L 13   AST U/L 17   GLUCOSE RANDOM mg/dL 100               Imaging Studies: I have personally reviewed pertinent imaging studies  Xr Chest Portable    Result Date: 11/29/2017  Impression: Enlargement of cardiac silhouette  No acute pulmonary disease   Workstation performed: [id number]     Xr Chest 2 Views    Result Date: 11/24/2017  Impression: No active pulmonary disease  Workstation performed: [id number]     Ct Head Without Contrast    Result Date: 11/24/2017  Impression: No acute intracranial hemorrhage seen    No CT signs of acute territorial infarction Stable CT Workstation performed: [id number]

## 2017-12-04 NOTE — PHYSICAL THERAPY NOTE
Physical Therapy Progress Note     12/04/17 1308   Pain Assessment   Pain Assessment FLACC   Pain Rating: FLACC (Activity) - Face 2   Restrictions/Precautions   Weight Bearing Precautions Per Order No   Other Precautions Cognitive; Bed Alarm; Fall Risk;Pain;Aspiration   General   Chart Reviewed Yes   Family/Caregiver Present Yes   Cognition   Overall Cognitive Status Impaired   Subjective   Subjective Patient supine in bed  Family and friends present t o session  Patient agreed to participate when asked for exercises  Bed Mobility   Supine to Sit 2  Maximal assistance   Additional items Assist x 2; Increased time required;LE management;Verbal cues   Sit to Supine 2  Maximal assistance   Additional items Assist x 2; Increased time required;LE management   Transfers   Sit to Stand 2  Maximal assistance   Additional items Assist x 2;Verbal cues   Stand to Sit 2  Maximal assistance   Additional items Assist x 2;Verbal cues   Balance   Static Sitting Fair -   Dynamic Sitting Poor +   Static Standing Zero   Activity Tolerance   Activity Tolerance Patient tolerated treatment well   Nurse Made Aware Yes   Exercises   Heelslides AAROM; Bilateral;10 reps   Hip Flexion Bilateral;AAROM;10 reps; Supine   Knee AROM Long Arc Quad Bilateral;AROM;AAROM;10 reps; Sitting   Assessment   Prognosis Fair   Problem List Decreased strength;Decreased range of motion;Decreased endurance; Impaired balance;Decreased mobility; Decreased coordination;Decreased cognition; Impaired judgement;Decreased safety awareness; Impaired vision;Pain   Assessment patient agreed to participate in tehrapy session  Treatment provided with OT as patient requires A x 2 for all levels of mobility due to impaired cognition and lethargy  patient reponsive to instructions given more than 50% of time  Patient able to perform supine LE AAROM/AROM  AROM performed with verbal and tactile cues  patient required Max A x 2 for supine to sit transfer   Patient able to maintain static sitting at the EOB with A level ranging from Max A to CS as session progressed  patient able to perform LAQs seated with CS at the EOB  STS transfers attemtped twice with max A x 2 with first attempt and Min A x 2 for second  Needed MAx A x 2 to maintain static standing and patient noted with severe forward flexion and unable to extend the hips fully and B/L knees in hyperextension  Patient attempted to move feet back when cued however unable to clear the ground  Second static standing patient performed with Min A x 1 and Mod A x 1  Patient noted with improved functional mobility, tolerance to activities and responsive to instructions given  patient c/o neck pain with sit to supine transfer  Max A for scooting patient up in bed and for rolling to the R    patient will benefit from continued skilled PT to improve functional mobility and strength  Barriers to Discharge Inaccessible home environment;Decreased caregiver support   Goals   Patient Goals None reported   STG Expiration Date 12/05/17   Plan   Treatment/Interventions Functional transfer training;LE strengthening/ROM; Therapeutic exercise;Cognitive reorientation;Patient/family training;Bed mobility;Gait training;Spoke to nursing;OT   Progress Slow progress, cognitive deficits   PT Frequency 2-3x/wk   Recommendation   Recommendation Long-term skilled PT   Equipment Recommended (TBD)   PT - OK to Discharge Yes         Karthikeyan Henderson, AUBREY

## 2017-12-04 NOTE — CASE MANAGEMENT
[address] [facility] in the [facility] by Nima Chin for 2017  Network Utilization Review Department  Phone: [phone]; Fax [phone]  ATTENTION: The Network Utilization Review Department is now centralized for our 7 Facilities  Make a note that we have a new phone and fax numbers for our Department  Please call with any questions or concerns to [phone] and carefully follow the prompts so that you are directed to the right person  All voicemails are confidential  Fax any determinations, approvals, denials, and requests for initial or continue stay review clinical to [phone]  Due to HIGH CALL volume, it would be easier if you could please send faxed requests to expedite your requests and in part, help us provide discharge notifications faster  Continued Stay Review    Date: 12/2/17    Vital Signs:  12/02/17 1500  98 4 °F (36 9 °C)  88  18   175/77  98 %  None (Room air)  Lying     Medications:   Scheduled Meds:   atorvastatin 20 mg Oral QPM   docusate sodium 100 mg Oral BID   enoxaparin 40 mg Subcutaneous Daily   losartan 100 mg Oral Daily   metoprolol tartrate 25 mg Oral Q12H [address]   multivitamin-minerals 1 tablet Oral Once     Continuous Infusions:   lactated ringers 75 mL/hr Last Rate: 75 mL/hr (12/03/17 0954)     PRN Meds:   acetaminophen    HYDROmorphone IV x 1    labetalol    metoprolol    ondansetron    polyvinyl alcohol    Abnormal Labs/Diagnostic Results:   12/2   WBC 4 31 - 10 16 Thousand/uL 12 02     RBC 3 81 - 5 12 Million/uL 3 69     Hemoglobin 11 5 - 15 4 g/dL 10 0     Hematocrit 34 8 - 46 1 % 32 1     MCHC 31 4 - 37 4 g/dL 31 2     Platelets 757 - 644 Thousands/uL 440       12/2 CXR - The patient's feeding tube has an abnormal position terminating within the mediastinal region and may be located within the distal esophagus or in a large hiatal hernia  Recommend reppositioning and repeat imaging      Age/Sex: 80 y o  female Assessment/Plan:   12/2 Medicine Progress Note      * Acute encephalopathy   Assessment & Plan     Stable  Improving, it was likely related with acute dehydration, electrolyte imbalance  Now on NG tube for nutrition  We will follow up progression patient might need PEG tube  Hold Risperidon  Waiting for placement       Toenail deformity   Assessment & Plan     Improved after podiatric evaluation       Alzheimer's disease   Assessment & Plan     Progressing/  Patient returning to her baseline it was most likely related with acute and on chronic dementia  Stable  Continue to Hold Risperdal   Prevent aspiration          Hypokalemia   Assessment & Plan     resolved       HLD (hyperlipidemia)   Assessment & Plan     Cont Statin       Peripheral edema   Assessment & Plan     Chronic  Likely secondary to venous insufficiency       HTN (hypertension), benign   Assessment & Plan        Stable  Continue home med       Leukocytosis   Assessment & Plan     No significant elevation, no sign of infection  CXR negative  Follow up CBC        Hypercalcemia   Assessment & Plan        Improved         VTE Pharmacologic Prophylaxis:   Pharmacologic: Enoxaparin (Lovenox)  Mechanical: Mechanical VTE prophylaxis in place  Current Length of Stay: 8 day(s)  Current Patient Status: Inpatient   Certification Statement: The patient will continue to require additional inpatient hospital stay due to acute encephalopathy     Discharge Plan: Looking at rehab or SNF  Will need Level 1 assessment prior to d/c plans can be finalized

## 2017-12-04 NOTE — PLAN OF CARE
Problem: PHYSICAL THERAPY ADULT  Goal: Performs mobility at highest level of function for planned discharge setting  See evaluation for individualized goals  Treatment/Interventions: Functional transfer training, LE strengthening/ROM, Therapeutic exercise, Endurance training, Patient/family training, Equipment eval/education, Bed mobility, Gait training          See flowsheet documentation for full assessment, interventions and recommendations  Outcome: Progressing  Prognosis: Fair  Problem List: Decreased strength, Decreased range of motion, Decreased endurance, Impaired balance, Decreased mobility, Decreased coordination, Decreased cognition, Impaired judgement, Decreased safety awareness, Impaired vision, Pain  Assessment: patient agreed to participate in tehrapy session  Treatment provided with OT as patient requires A x 2 for all levels of mobility due to impaired cognition and lethargy  patient reponsive to instructions given more than 50% of time  Patient able to perform supine LE AAROM/AROM  AROM performed with verbal and tactile cues  patient required Max A x 2 for supine to sit transfer  Patient able to maintain static sitting at the EOB with A level ranging from Max A to CS as session progressed  patient able to perform LAQs seated with CS at the EOB  STS transfers attemtped twice with max A x 2 with first attempt and Min A x 2 for second  Needed MAx A x 2 to maintain static standing and patient noted with severe forward flexion and unable to extend the hips fully and B/L knees in hyperextension  Patient attempted to move feet back when cued however unable to clear the ground  Second static standing patient performed with Min A x 1 and Mod A x 1  Patient noted with improved functional mobility, tolerance to activities and responsive to instructions given  patient c/o neck pain with sit to supine transfer  Max A for scooting patient up in bed and for rolling to the R    patient will benefit from continued skilled PT to improve functional mobility and strength  Barriers to Discharge: Inaccessible home environment, Decreased caregiver support     Recommendation: Long-term skilled PT     PT - OK to Discharge: Yes    See flowsheet documentation for full assessment

## 2017-12-04 NOTE — WOUND OSTOMY CARE
Progress Note - Wound   Jennie Paiz 80 y o  female MRN: [id number]  Unit/Bed#: -01 Encounter: [id number]      Assessment:  Patient seen for weekly follow up visit wound care  Patient seen in bed for incontinence at time of assessment with staff  Marely-care rendered  Patient is grossly incontinent of bowel and bladder  keofed tube is being d/c today  Speech working with patient  Nutrition is following along with patient as well  Dependent for care, assist of 1-2 with turning  B/l heels are intact with no redness noted  Recommend to continue with offloading and hydraguard cream     B/l buttocks and sacrum are intact with no redness, temperature changes or wounds noted  Recommend to continue to use hydraguard cream for protection from incontinence  Skin appears less dry - continue skin nourishing cream daily  L buttock/ ischial stage 2 pressure injury with noted improvements  Wound with decreased size  Wound bed is 100% beefy red scattered areas with pink healed tissue in between  Edge is attached dry and intact with no maceration noted  Marely-wound intact with pink healed scar tissue that is hypopigmented  No induration, redness or fluctuance noted  Patients family at bedside aware of progress and assessment of the wound  Recommend to continue current plan of care in place with calazime cream to the wound bed  See flow sheets for more detailed assessment findings  Wound care will follow along with patient weekly  Primary nurse aware of assessment and plan of care  Plan:   1  Cleanse stage 2 pressure injury to L buttock with soap and water, pat dry, apply calazime to wound bed and apply hydraguard to b/l buttocks and marely-wound TID and PRN   2  Apply hydraguard to b/l heels BID and PRN for prevention and protection  3  Apply skin nourishing cream to the entire skin daily for moisture   4  Turn and reposition patient every 2 hours   5  Soft care cushion to chair when OOB, if able   6   Elevate heels off of bed with pillows to offload  7  Wound care will follow along with patient weekly, please call with questions and concerns [phone]           Objective:    Vitals: Blood pressure 136/66, pulse 103, temperature 97 6 °F (36 4 °C), temperature source Oral, resp  rate 18, height 5' 4" (1 626 m), weight 60 9 kg (134 lb 4 2 oz), SpO2 97 %  ,Body mass index is 23 05 kg/m²  Pressure Ulcer 11/24/17 Buttocks Left; Lower stage II pressure ulcer (Active)   Staging Stage II 12/4/2017 11:00 AM   Wound Description Beefy red;Pink 12/4/2017 11:00 AM   Marely-wound Assessment Clean;Dry; Intact 12/4/2017 11:00 AM   Wound Length (cm) 1 4 cm 12/4/2017 11:00 AM   Wound Width (cm) 2 cm 12/4/2017 11:00 AM   Wound Depth (cm) 0 1 12/4/2017 11:00 AM   Calculated Wound Area (cm^2) 2 8 cm^2 12/4/2017 11:00 AM   Calculated Wound Volume (cm^3) 0 28 cm^3 12/4/2017 11:00 AM   Change in Wound Size % 74 55 12/4/2017 11:00 AM   Drainage Amount Scant 12/4/2017 11:00 AM   Drainage Description Serosanguineous 12/4/2017 11:00 AM   Treatment Cleansed;Elevated with pillow(s); Offload;Softcare cushion;Turn & reposition 12/4/2017 11:00 AM   Dressing Protective barrier 12/4/2017 11:00 AM   Wound packed? No 12/4/2017 11:00 AM   Packing- # removed 0 12/4/2017 11:00 AM   Packing- # inserted 0 12/4/2017 11:00 AM   Patient Tolerance Tolerated well 12/4/2017 11:00 AM   Dressing Status Open to air 12/4/2017 11:00 AM     Recommendations written as orders    Obdulio Biggs RN BSN

## 2017-12-04 NOTE — PROGRESS NOTES
Progress Note - Izzy Grad 80 y o  female MRN: [id number]    Unit/Bed#: -01 Encounter: [id number]        * Acute encephalopathy   Assessment & Plan    Stable  Improving, it was likely related with acute dehydration, electrolyte imbalance  Now on NG tube for nutrition  We will follow up progression patient might need PEG tube  Pass swallow test today   Hold Risperidon  Waiting for placement  -D/C NG tub  -Start p o diet         Protein-calorie malnutrition ([address] )   Assessment & Plan    Off of NG tube after it went off  Follow up nutrition recommendation        Toenail deformity   Assessment & Plan    Improved after podiatric evaluation        Alzheimer's disease   Assessment & Plan    Progressing/  Patient returning to her baseline it was most likely related with acute and on chronic dementia  Stable  Continue to Hold Risperdal   Prevent aspiration          Hypokalemia   Assessment & Plan    resolved        Peripheral edema   Assessment & Plan    Chronic  Likely secondary to venous insufficiency        HTN (hypertension), benign   Assessment & Plan      Stable  Continue home med        Leukocytosis   Assessment & Plan    No significant elevation, no sign of infection  CXR negative  Follow up CBC         Hypercalcemia   Assessment & Plan    Improved               VTE Pharmacologic Prophylaxis:   Pharmacologic: Enoxaparin (Lovenox)  Mechanical: Mechanical VTE prophylaxis in place  Patient Centered Rounds: I have performed bedside rounds with nursing staff today  Discussions with Specialists or Other Care Team Provider: gastroenterology   Education and Discussions with Family / Patient: daughter   Time Spent for Care: 20 minutes  More than 50% of total time spent on counseling and coordination of care as described above      Current Length of Stay: 10 day(s)  Current Patient Status: Inpatient   Certification Statement: The patient will continue to require additional inpatient hospital stay due to acute encephalopathy    Discharge Plan: Depend on clinical course  Code Status: Level 1 - Full Code    Subjective:   Patient found alert, awake, without acute distress    Objective:   Vitals:   Temp (24hrs), Av 6 °F (37 °C), Min:97 6 °F (36 4 °C), Max:99 4 °F (37 4 °C)    HR:  [] 103  Resp:  [16-18] 18  BP: (136-163)/(66-79) 136/66  SpO2:  [95 %-97 %] 97 %  Body mass index is 23 05 kg/m²  Input and Output Summary (last 24 hours): Intake/Output Summary (Last 24 hours) at 17 1249  Last data filed at 17 1237   Gross per 24 hour   Intake              250 ml   Output             1968 ml   Net            -1718 ml       Physical Exam:     Physical Exam   Constitutional: No distress  Cardiovascular: Normal rate, regular rhythm and normal heart sounds  Exam reveals no gallop  No murmur heard  Pulmonary/Chest: Effort normal and breath sounds normal  No respiratory distress  She has no wheezes  She has no rales  She exhibits no tenderness  Abdominal: Soft  She exhibits no distension and no mass  There is no tenderness  There is no rebound and no guarding  Musculoskeletal: Normal range of motion  She exhibits no edema, tenderness or deformity  Neurological: She is alert  Awake, mumbling sounds    Skin: Skin is warm  She is not diaphoretic  Psychiatric: She has a normal mood and affect         Additional Data:   Labs:    Results from last 7 days  Lab Units 17  1342 17  1357   WBC Thousand/uL 12 02* 13 69*   HEMOGLOBIN g/dL 10 0* 10 9*   HEMATOCRIT % 32 1* 33 9*   PLATELETS Thousands/uL 440* 467*   NEUTROS PCT %  --  80*   LYMPHS PCT %  --  6*   LYMPHO PCT % 8*  --    MONOS PCT %  --  14*   MONO PCT MAN % 10  --    EOS PCT %  --  0   EOSINO PCT MANUAL % 0  --        Results from last 7 days  Lab Units 17  1414   SODIUM mmol/L 141   POTASSIUM mmol/L 3 8   CHLORIDE mmol/L 102   CO2 mmol/L 30   BUN mg/dL 15   CREATININE mg/dL 0 70   CALCIUM mg/dL 10 3*   TOTAL PROTEIN g/dL 6  8   BILIRUBIN TOTAL mg/dL 0 50   ALK PHOS U/L 66   ALT U/L 13   AST U/L 17   GLUCOSE RANDOM mg/dL 100           * I Have Reviewed All Lab Data Listed Above  * Additional Pertinent Lab Tests Reviewed: All Labs Within Last 24 Hours Reviewed    Imaging:    Imaging Reports Reviewed Today Include:     Cultures:   Blood Culture:   Lab Results   Component Value Date    BLOODCX No Growth After 5 Days  11/24/2017    BLOODCX No Growth After 5 Days  11/24/2017     Urine Culture: No results found for: URINECX  Sputum Culture: No components found for: SPUTUMCX  Wound Culture: No results found for: WOUNDCULT    Last 24 Hours Medication List:     atorvastatin 20 mg Oral QPM   docusate sodium 100 mg Oral BID   enoxaparin 40 mg Subcutaneous Daily   losartan 100 mg Oral Daily   metoprolol tartrate 25 mg Oral Q12H [address]   multivitamin-minerals 1 tablet Oral Once        Today, Patient Was Seen By: Dakota Quiroz MD    ** Please Note: Dragon 360 Dictation voice to text software may have been used in the creation of this document   **

## 2017-12-04 NOTE — PLAN OF CARE
Problem: DISCHARGE PLANNING - CARE MANAGEMENT  Goal: Discharge to post-acute care or home with appropriate resources  INTERVENTIONS:  - Conduct assessment to determine patient/family and health care team treatment goals, and need for post-acute services based on payer coverage, community resources, and patient preferences, and barriers to discharge  - Address psychosocial, clinical, and financial barriers to discharge as identified in assessment in conjunction with the patient/family and health care team  - Arrange appropriate level of post-acute services according to patient's   needs and preference and payer coverage in collaboration with the physician and health care team  - Communicate with and update the patient/family, physician, and health care team regarding progress on the discharge plan  - Arrange appropriate transportation to post-acute venues    Outcome: Progressing  Cm received phone call from Ms Gianluca Henry who stated after meeting with the pt and speaking with the daughter she believes the pt's primary diagnosis would be the dementia/Alzheimer's which is contributing to the [facility]  She stated Cm is able to contact [facility] to see if they would be willing to take pt or if they still want her to complete the Level II paperwork  Cm will contact [facility] to obtain a determination

## 2017-12-04 NOTE — OCCUPATIONAL THERAPY NOTE
Occupational Therapy Progress Note      Patient Name: George Thakur    HXSGP'L Date: 12/4/2017    Problem List:   Patient Active Problem List   Diagnosis    Psychosis    Acute encephalopathy    Hypercalcemia    Leukocytosis    HTN (hypertension), benign    Peripheral edema    HLD (hyperlipidemia)    Hyperlipidemia    Hypokalemia    Alzheimer's disease    Toenail deformity    Protein-calorie malnutrition ([facility] [address] )          12/04/17 1307   Restrictions/Precautions   Weight Bearing Precautions Per Order No   Other Precautions Cognitive; Chair Alarm; Fall Risk;Multiple lines;Aspiration   General   Response to Previous Treatment Patient with no complaints from previous session   Family/Caregiver Present Pt's brother and 2 friends present during session   Pain Assessment   Pain Score 4   Pain Rating: FLACC (Rest) - Face 0   Pain Rating: FLACC (Rest) - Legs 0   Pain Rating: FLACC (Rest) - Activity 0   Pain Rating: FLACC (Rest) - Cry 0   Pain Rating: FLACC (Rest) - Consolability 0   Score: FLACC (Rest) 0   Pain Rating: FLACC (Activity) - Face 1   Pain Rating: FLACC (Activity) - Legs 1   Pain Rating: FLACC (Activity) - Activity 0   Pain Rating: FLACC (Activity) - Cry 1   Pain Rating: FLACC (Activity) - Consolability 1   Score: FLACC (Activity) 4   ADL   Grooming Assistance 2  Maximal Assistance   Grooming Deficit Setup; Increased time to complete;Verbal cueing   Grooming Comments seated at EOB w/ mod A to CS  Pt benefits from significant verbal cues / physical prompts / assist to grasp tissue box w/ left hand, and use right hand to pull tissue out of box  Pt required max A to wipe face / mouth using tissue   Bed Mobility   Supine to Sit 2  Maximal assistance   Additional items Assist x 2;LE management;Verbal cues; Increased time required   Sit to Supine 2  Maximal assistance   Additional items Assist x 2; Increased time required;LE management;Verbal cues   Additional Comments Pt benefits from significant cues / assist to follow directions to complete bed mobility supitn <> sit  Transfers   Sit to Stand 2  Maximal assistance   Additional items Assist x 2;Verbal cues; Increased time required  (physical cues / prompts)   Stand to Sit 2  Maximal assistance   Additional items Assist x 2   Stand pivot Unable to assess   Additional Comments pt progressed to min A x2 on 2nd sit to tand from EOB  Pt used R UE to push up from bed  Cognition   Overall Cognitive Status Impaired   Arousal/Participation Responsive   Attention Difficulty attending to directions   Orientation Level Oriented to person;Disoriented to situation;Disoriented to time;Disoriented to place   Memory Decreased recall of precautions;Decreased recall of recent events;Decreased short term memory;Decreased long term memory   Following Commands Follows one step commands inconsistently   Comments Pt able to identify her brother present in room  Pt benefits from significant cues to follow one step directions   Vision   Vision Comments cues throughout session for decreased vision   Activity Tolerance   Activity Tolerance Patient limited by fatigue  (decreased cognition)   Medical Staff Made Aware per RNMikie ok to see   Assessment   Assessment Pt seen for OT session  Pt benefits from co -tx w/ Damaso BURGOS due to level of physical assistance, cognitive support, and cues / prompts required to actively participate  Pt completed bed mobility supine <> sit w/ max A x2  Pt engaged in sit to stand from EOB w/ max A x2 first trial, and min A x2 second trial  Pt engaged in grooming while seated at EOB w/ max A, and mod A to CS to mainta balance at EOB  Continue to recommnd post acute rehab when medically stable for discharge from acute care  Will continue to follow   Plan   Treatment Interventions ADL retraining;Cognitive reorientation; Activityengagement;UE strengthening/ROM; Functional transfer training   Goal Expiration Date 12/09/17   Treatment Day 1   OT Frequency 3-5x/wk Recommendation   OT Discharge Recommendation Other (Comment)  (post acute rehab)   Equipment Recommended Bedside commode   OT - OK to Discharge (to post acute rehab when medicallyt stable)   Marielle Bragg, OT

## 2017-12-04 NOTE — SPEECH THERAPY NOTE
Speech Language/Pathology                             SLP  Note  Patient Name: Saundra MCELROY Date: 12/4/2017       Subjective:  Pt seen for dysphagia tx, dtr present  NGT came out sat night, pt was seen by speech tx on sun, cont to be not appropriate for po  Objective:  Pt awake, alert  Talking to dtr, not all of it making sense  dtr said pt has been saying she's hungry  Pt given tsps and cup sips of HTL, ate 4 oz of stefano pudding by spoon  Cont w/ fair bolus retrieval via spoon, better by cup  Cont w/ decreased bolus control & awareness at times  pt cont to talk w/ food/liquid in her mouth  Wet, gurgly voice toward end of session w/ HTL  Needed max cues to swallow  Pt followed commands to swallow  Swallows were audible at times, coughing episode x1 w/ HTL when pt did not swallow  Discussed risks of aspiration due pt's dementia and decreased awareness of po at times  dtr stated understanding and wanted to proceed w/ diet initiation  Assessment:  Pt w/ increased swallow function-ate 4 oz of pudding and 3 oz of HTL  Pt cont w /aspiration risk due to cognitive status- pt's dtr aware  Plan/Recommendations:  rec begin dysphagia 1 puree w/ HTL by cup or tsp   meds crushed in puree   encourage po  Cont aspiration precautions  Will cont to follow

## 2017-12-04 NOTE — PLAN OF CARE
Problem: OCCUPATIONAL THERAPY ADULT  Goal: Performs self-care activities at highest level of function for planned discharge setting  See evaluation for individualized goals  Treatment Interventions: ADL retraining, Functional transfer training, UE strengthening/ROM, Cognitive reorientation, Patient/family training, Continued evaluation, Activityengagement  Equipment Recommended: Bedside commode, Tub seat with back       See flowsheet documentation for full assessment, interventions and recommendations  Outcome: Progressing  Limitation: Decreased ADL status, Decreased UE strength, Decreased cognition, Decreased self-care trans, Decreased high-level ADLs     Assessment: Pt seen for OT session  Pt benefits from co -tx w/ PTA, Damaso due to level of physical assistance, cognitive support, and cues / prompts required to actively participate  Pt completed bed mobility supine <> sit w/ max A x2  Pt engaged in sit to stand from EOB w/ max A x2 first trial, and min A x2 second trial  Pt engaged in grooming while seated at EOB w/ max A, and mod A to CS to mainta balance at EOB  Continue to recommnd post acute rehab when medically stable for discharge from acute care   Will continue to follow  Recommendation: Geriatric Consult (Neurology consult)  OT Discharge Recommendation: Other (Comment) (post acute rehab)  OT - OK to Discharge:  (to post acute rehab when medicallyt stable)

## 2017-12-04 NOTE — PLAN OF CARE
Problem: DISCHARGE PLANNING - CARE MANAGEMENT  Goal: Discharge to post-acute care or home with appropriate resources  INTERVENTIONS:  - Conduct assessment to determine patient/family and health care team treatment goals, and need for post-acute services based on payer coverage, community resources, and patient preferences, and barriers to discharge  - Address psychosocial, clinical, and financial barriers to discharge as identified in assessment in conjunction with the patient/family and health care team  - Arrange appropriate level of post-acute services according to patients   needs and preference and payer coverage in collaboration with the physician and health care team  - Communicate with and update the patient/family, physician, and health care team regarding progress on the discharge plan  - Arrange appropriate transportation to post-acute venues   Outcome: Progressing  LOS 10  Not a bundle; Not a readmission  Cm received call from Candy Goncalves at [facility] who had questions realted to pt's cognition and hx of MH  After reviewing pt's clinical that was sent, she feels she is still not able to determine whether pt is primary dementia or MH  She will be in to assess the pt and contact the daughter  Cm provided Ms Marielle [facility] with pt's room number  Cm will continue to follow to assist with needs and DCP

## 2017-12-04 NOTE — PLAN OF CARE
Problem: Nutrition/Hydration-ADULT  Goal: Nutrient/Hydration intake appropriate for improving, restoring or maintaining nutritional needs  Monitor and assess patient's nutrition/hydration status for malnutrition (ex- brittle hair, bruises, dry skin, pale skin and conjunctiva, muscle wasting, smooth red tongue, and disorientation)  Collaborate with interdisciplinary team and initiate plan and interventions as ordered  Monitor patient's weight and dietary intake as ordered or per policy  Utilize nutrition screening tool and intervene per policy  Determine patient's food preferences and provide high-protein, high-caloric foods as appropriate  INTERVENTIONS:  - Monitor oral intake, urinary output, labs, and treatment plans  - Assess nutrition and hydration status and recommend course of action  - Evaluate amount of meals eaten  - Assist patient with eating if necessary   - Allow adequate time for meals  - Recommend/ encourage appropriate diets, oral nutritional supplements, and vitamin/mineral supplements  - Order, calculate, and assess calorie counts as needed  - Recommend, monitor, and adjust tube feedings and TPN/PPN based on assessed needs  - Assess need for intravenous fluids  - Provide specific nutrition/hydration education as appropriate  - Include patient/family/caregiver in decisions related to nutrition   Outcome: Progressing  Will continue to monitor pt PO intake now that EN is d/c and a PO diet has resumed

## 2017-12-04 NOTE — ASSESSMENT & PLAN NOTE
Stable  Improving, it was likely related with acute dehydration, electrolyte imbalance  Now on NG tube for nutrition  We will follow up progression patient might need PEG tube   Pass swallow test today   Hold Hareshn  Waiting for placement  -D/C NG tub  -Start p o diet

## 2017-12-04 NOTE — PLAN OF CARE
Problem: Potential for Falls  Goal: Patient will remain free of falls  INTERVENTIONS:  - Assess patient frequently for physical needs  -  Identify cognitive and physical deficits and behaviors that affect risk of falls    -  [place] fall precautions as indicated by assessment   - Educate patient/family on patient safety including physical limitations  - Instruct patient to call for assistance with activity based on assessment  - Modify environment to reduce risk of injury  - Consider OT/PT consult to assist with strengthening/mobility   Outcome: Progressing      Problem: Prexisting or High Potential for Compromised Skin Integrity  Goal: Skin integrity is maintained or improved  INTERVENTIONS:  - Identify patients at risk for skin breakdown  - Assess and monitor skin integrity  - Assess and monitor nutrition and hydration status  - Monitor labs (i e  albumin)  - Assess for incontinence   - Turn and reposition patient  - Assist with mobility/ambulation  - Relieve pressure over bony prominences  - Avoid friction and shearing  - Provide appropriate hygiene as needed including keeping skin clean and dry  - Evaluate need for skin moisturizer/barrier cream  - Collaborate with interdisciplinary team (i e  Nutrition, Rehabilitation, etc )   - Patient/family teaching   Outcome: Progressing      Problem: SAFETY,RESTRAINT: NV/NON-SELF DESTRUCTIVE BEHAVIOR  Goal: Remains free of harm/injury (restraint for non violent/non self-detsructive behavior)  INTERVENTIONS:  - Instruct patient/family regarding restraint use   - Assess and monitor physiologic and psychological status   - Provide interventions and comfort measures to meet assessed patient needs   - Identify and implement measures to help patient regain control  - Assess readiness for release of restraint    Outcome: Progressing    Goal: Returns to optimal restraint-free functioning  INTERVENTIONS:  - Assess the patient's behavior and symptoms that indicate continued need for restraint  - Identify and implement measures to help patient regain control  - Assess readiness for release of restraint    Outcome: Progressing      Problem: Nutrition/Hydration-ADULT  Goal: Nutrient/Hydration intake appropriate for improving, restoring or maintaining nutritional needs  Monitor and assess patient's nutrition/hydration status for malnutrition (ex- brittle hair, bruises, dry skin, pale skin and conjunctiva, muscle wasting, smooth red tongue, and disorientation)  Collaborate with interdisciplinary team and initiate plan and interventions as ordered  Monitor patient's weight and dietary intake as ordered or per policy  Utilize nutrition screening tool and intervene per policy  Determine patient's food preferences and provide high-protein, high-caloric foods as appropriate       INTERVENTIONS:  - Monitor oral intake, urinary output, labs, and treatment plans  - Assess nutrition and hydration status and recommend course of action  - Evaluate amount of meals eaten  - Assist patient with eating if necessary   - Allow adequate time for meals  - Recommend/ encourage appropriate diets, oral nutritional supplements, and vitamin/mineral supplements  - Order, calculate, and assess calorie counts as needed  - Recommend, monitor, and adjust tube feedings and TPN/PPN based on assessed needs  - Assess need for intravenous fluids  - Provide specific nutrition/hydration education as appropriate  - Include patient/family/caregiver in decisions related to nutrition   Outcome: Progressing      Problem: DISCHARGE PLANNING - CARE MANAGEMENT  Goal: Discharge to post-acute care or home with appropriate resources  INTERVENTIONS:  - Conduct assessment to determine patient/family and health care team treatment goals, and need for post-acute services based on payer coverage, community resources, and patient preferences, and barriers to discharge  - Address psychosocial, clinical, and financial barriers to discharge as identified in assessment in conjunction with the patient/family and health care team  - Arrange appropriate level of post-acute services according to patients   needs and preference and payer coverage in collaboration with the physician and health care team  - Communicate with and update the patient/family, physician, and health care team regarding progress on the discharge plan  - Arrange appropriate transportation to post-acute venues   Outcome: Progressing

## 2017-12-04 NOTE — PLAN OF CARE
Problem: Potential for Falls  Goal: Patient will remain free of falls  INTERVENTIONS:  - Assess patient frequently for physical needs  -  Identify cognitive and physical deficits and behaviors that affect risk of falls    -  [place] fall precautions as indicated by assessment   - Educate patient/family on patient safety including physical limitations  - Instruct patient to call for assistance with activity based on assessment  - Modify environment to reduce risk of injury  - Consider OT/PT consult to assist with strengthening/mobility   Outcome: Progressing      Problem: Prexisting or High Potential for Compromised Skin Integrity  Goal: Skin integrity is maintained or improved  INTERVENTIONS:  - Identify patients at risk for skin breakdown  - Assess and monitor skin integrity  - Assess and monitor nutrition and hydration status  - Monitor labs (i e  albumin)  - Assess for incontinence   - Turn and reposition patient  - Assist with mobility/ambulation  - Relieve pressure over bony prominences  - Avoid friction and shearing  - Provide appropriate hygiene as needed including keeping skin clean and dry  - Evaluate need for skin moisturizer/barrier cream  - Collaborate with interdisciplinary team (i e  Nutrition, Rehabilitation, etc )   - Patient/family teaching   Outcome: Progressing      Problem: SAFETY,RESTRAINT: NV/NON-SELF DESTRUCTIVE BEHAVIOR  Goal: Remains free of harm/injury (restraint for non violent/non self-detsructive behavior)  INTERVENTIONS:  - Instruct patient/family regarding restraint use   - Assess and monitor physiologic and psychological status   - Provide interventions and comfort measures to meet assessed patient needs   - Identify and implement measures to help patient regain control  - Assess readiness for release of restraint    Outcome: Progressing    Goal: Returns to optimal restraint-free functioning  INTERVENTIONS:  - Assess the patient's behavior and symptoms that indicate continued need for restraint  - Identify and implement measures to help patient regain control  - Assess readiness for release of restraint    Outcome: Progressing      Problem: Nutrition/Hydration-ADULT  Goal: Nutrient/Hydration intake appropriate for improving, restoring or maintaining nutritional needs  Monitor and assess patient's nutrition/hydration status for malnutrition (ex- brittle hair, bruises, dry skin, pale skin and conjunctiva, muscle wasting, smooth red tongue, and disorientation)  Collaborate with interdisciplinary team and initiate plan and interventions as ordered  Monitor patient's weight and dietary intake as ordered or per policy  Utilize nutrition screening tool and intervene per policy  Determine patient's food preferences and provide high-protein, high-caloric foods as appropriate       INTERVENTIONS:  - Monitor oral intake, urinary output, labs, and treatment plans  - Assess nutrition and hydration status and recommend course of action  - Evaluate amount of meals eaten  - Assist patient with eating if necessary   - Allow adequate time for meals  - Recommend/ encourage appropriate diets, oral nutritional supplements, and vitamin/mineral supplements  - Order, calculate, and assess calorie counts as needed  - Recommend, monitor, and adjust tube feedings and TPN/PPN based on assessed needs  - Assess need for intravenous fluids  - Provide specific nutrition/hydration education as appropriate  - Include patient/family/caregiver in decisions related to nutrition   Outcome: Progressing      Problem: DISCHARGE PLANNING - CARE MANAGEMENT  Goal: Discharge to post-acute care or home with appropriate resources  INTERVENTIONS:  - Conduct assessment to determine patient/family and health care team treatment goals, and need for post-acute services based on payer coverage, community resources, and patient preferences, and barriers to discharge  - Address psychosocial, clinical, and financial barriers to discharge as identified in assessment in conjunction with the patient/family and health care team  - Arrange appropriate level of post-acute services according to patient's   needs and preference and payer coverage in collaboration with the physician and health care team  - Communicate with and update the patient/family, physician, and health care team regarding progress on the discharge plan  - Arrange appropriate transportation to post-acute venues    Outcome: Progressing

## 2017-12-05 VITALS
HEART RATE: 83 BPM | SYSTOLIC BLOOD PRESSURE: 135 MMHG | RESPIRATION RATE: 18 BRPM | TEMPERATURE: 98 F | BODY MASS INDEX: 22.92 KG/M2 | OXYGEN SATURATION: 98 % | HEIGHT: 64 IN | WEIGHT: 134.26 LBS | DIASTOLIC BLOOD PRESSURE: 63 MMHG

## 2017-12-05 PROCEDURE — 92526 ORAL FUNCTION THERAPY: CPT

## 2017-12-05 RX ORDER — ACETAMINOPHEN 325 MG/1
650 TABLET ORAL EVERY 6 HOURS PRN
Qty: 30 TABLET | Refills: 0
Start: 2017-12-05

## 2017-12-05 RX ORDER — POLYVINYL ALCOHOL 14 MG/ML
1 SOLUTION/ DROPS OPHTHALMIC
Qty: 15 ML | Refills: 0 | Status: SHIPPED | OUTPATIENT
Start: 2017-12-05 | End: 2021-12-26 | Stop reason: HOSPADM

## 2017-12-05 RX ORDER — DIPHENOXYLATE HYDROCHLORIDE AND ATROPINE SULFATE 2.5; .025 MG/1; MG/1
1 TABLET ORAL DAILY
Refills: 0
Start: 2017-12-05 | End: 2018-04-11 | Stop reason: HOSPADM

## 2017-12-05 RX ORDER — DOCUSATE SODIUM 100 MG/1
100 CAPSULE, LIQUID FILLED ORAL 2 TIMES DAILY
Qty: 10 CAPSULE | Refills: 0
Start: 2017-12-05 | End: 2021-12-26 | Stop reason: HOSPADM

## 2017-12-05 RX ADMIN — METOPROLOL TARTRATE 25 MG: 25 TABLET, FILM COATED ORAL at 10:20

## 2017-12-05 RX ADMIN — LOSARTAN POTASSIUM 100 MG: 50 TABLET ORAL at 10:20

## 2017-12-05 RX ADMIN — ENOXAPARIN SODIUM 40 MG: 40 INJECTION SUBCUTANEOUS at 10:21

## 2017-12-05 NOTE — PROGRESS NOTES
Speech therapy bedside working with pt and daughter is present  Pt is eating breakfast with assistance from daughter  Pt tolerating diet  Pt bed alarm on and will continue to monitor

## 2017-12-05 NOTE — SPEECH THERAPY NOTE
Speech Language/Pathology                             SLP  Note  Patient Name: Livia President  QTLDI'J Date: 12/5/2017       Subjective:  Pt seen for dysphagia tx at [facility]  dtr at bedside  Fed pt   Pt ate 50% of dinner last night  Pt awake, alert, verbalizing, laughing, increased following commands  Objective:  Pt fed tsps of puree, HTL and textured pureed scrambled eggs  Pt w/ decreased manipulation/ bolus formation w/ eggs- needed tsps of puree to transfer eggs  edcuated pt's dtr on appropriate bolus and watching/ cuing for pt to swallow  dtr able to return demonstration appropriately  Pt cont to verbalize before swallowing, dtr cued pt to swallow, pt followed commands to swallow ~ 85% of the time  Voice remained clear  No coughing, choking noted  Assessment:  Pt tolerated puree diet w/ HTL w/o overt s/s aspiration  Po intake is improving  Plan/Recommendations:  Cont to follow

## 2017-12-05 NOTE — NURSING NOTE
Report called in to [facility] and handoff given to  [facility]

## 2017-12-05 NOTE — PLAN OF CARE
Problem: DISCHARGE PLANNING - CARE MANAGEMENT  Goal: Discharge to post-acute care or home with appropriate resources  INTERVENTIONS:  - Conduct assessment to determine patient/family and health care team treatment goals, and need for post-acute services based on payer coverage, community resources, and patient preferences, and barriers to discharge  - Address psychosocial, clinical, and financial barriers to discharge as identified in assessment in conjunction with the patient/family and health care team  - Arrange appropriate level of post-acute services according to patient's   needs and preference and payer coverage in collaboration with the physician and health care team  - Communicate with and update the patient/family, physician, and health care team regarding progress on the discharge plan  - Arrange appropriate transportation to post-acute venues    Outcome: Completed Date Met: 12/05/17  CM intern spoke with Adams Morley from [facility] ([phone]) to set up transport  Pt is set up for transport at 4:30p today going [facility]  The Auth number ( [id number]) has been obtained for SNF stay and BLS transport  Cm informed the nurse and daughter and the receiving facility ([phone])  about transport information

## 2017-12-05 NOTE — DISCHARGE SUMMARY
Discharge Summary - [facility] Internal Medicine    Patient Information: Leonila Das 80 y o  female MRN: [id number]  Unit/Bed#: -01 Encounter: [id number]    Discharging Physician / Practitioner: Carol Guerin MD  PCP: Phani Callaway MD  Admission Date: 11/24/2017  Discharge Date: 12/05/17    Reason for Admission:  Confusion    Discharge Diagnoses:     Principal Problem:    Acute encephalopathy  Active Problems:    Hypercalcemia    Leukocytosis    HTN (hypertension), benign    Peripheral edema    HLD (hyperlipidemia)    Hypokalemia    Alzheimer's disease    Toenail deformity    Protein-calorie malnutrition ([address] )  Resolved Problems:    * No resolved hospital problems  *      Consultations During Hospital Stay:  · Gastroenterology  · [address]  · Podiatry    Procedures Performed:     · CT scan head - No acute intracranial hemorrhage seen  No CT signs of acute territorial infarction Stable CT  · Portable chest x-ray enlarged cardiac silhouette no active pulmonary disease    Significant Findings / Test Results:     · None    Incidental Findings:   · None     Test Results Pending at Discharge (will require follow up): · None     Outpatient Tests Requested:  · None    Complications:  None    Hospital Course:     Leonila Das is a 80 y o  female patient who originally presented to the hospital on 11/24/2017 due to confusion  Patient was noted to be dehydrated on arrival the hospital   She had issues with normal swallowing during this hospitalization was noted to have advanced dementia with dehydration and evidence of malnutrition  Patient did have temporary NG tube feeds during this hospitalization was ultimately seen by speech able to tolerate  Level 1 discharge diet with honey thick liquids  Daughter at bedside doing well and being fed by family in greater than 50-75% of meals    We long conversation regarding goals of care currently patient is to be transferred to extended care facility may unsure as family if they would would not want feeding tube for her mother should her dementia advanced    Condition at Discharge: poor     Discharge Day Visit / Exam:     Subjective:  Awake alert no acute distress  Vitals: Blood Pressure: 131/64 (12/05/17 0717)  Pulse: 85 (12/05/17 0717)  Temperature: 98 4 °F (36 9 °C) (12/05/17 0717)  Temp Source: Oral (12/05/17 0717)  Respirations: 18 (12/05/17 0717)  Height: 5' 4" (162 6 cm) (11/25/17 1544)  Weight - Scale: 60 9 kg (134 lb 4 2 oz) (11/24/17 1531)  SpO2: 97 % (12/05/17 0717)  Exam:   Physical Exam   Constitutional: No distress  Cardiovascular: Normal rate  Exam reveals no gallop and no friction rub  No murmur heard  Pulmonary/Chest: Effort normal  No respiratory distress  She has no wheezes  She has no rales  Abdominal: Soft  She exhibits no distension  There is no tenderness  There is no rebound and no guarding  Neurological: She is alert  Skin: She is not diaphoretic  Discussion with Family:  Patient's daughter at bedside goals of care review patient currently full code    Discharge instructions/Information to patient and family:   See after visit summary for information provided to patient and family  Provisions for Follow-Up Care:  See after visit summary for information related to follow-up care and any pertinent home health orders  Disposition:     Other [facility]    For Discharges to [facility] SNF:   · Not Applicable to this Patient - Not Applicable to this Patient    Planned Readmission:  No     Discharge Statement:  I spent 30 minutes discharging the patient  This time was spent on the day of discharge  I had direct contact with the patient on the day of discharge  Greater than 50% of the total time was spent examining patient, answering all patient questions, arranging and discussing plan of care with patient as well as directly providing post-discharge instructions    Additional time then spent on discharge activities  Discharge Medications:  See after visit summary for reconciled discharge medications provided to patient and family        ** Please Note: This note has been constructed using a voice recognition system **

## 2017-12-05 NOTE — PLAN OF CARE
Nutrition/Hydration-ADULT     Nutrient/Hydration intake appropriate for improving, restoring or maintaining nutritional needs Adequate for Discharge        Potential for Falls     Patient will remain free of falls Adequate for Discharge        Prexisting or High Potential for Compromised Skin Integrity     Skin integrity is maintained or improved Adequate for Discharge        SAFETY,RESTRAINT: NV/NON-SELF DESTRUCTIVE BEHAVIOR     Remains free of harm/injury (restraint for non violent/non self-detsructive behavior) Adequate for Discharge     Returns to optimal restraint-free functioning Adequate for Discharge

## 2017-12-06 NOTE — CASE MANAGEMENT
Notification of Discharge  This is a Notification of Discharge from our facility [address]  Please be advised that this patient has been discharge from our facility  Below you will find the admission and discharge date and time including the patients disposition  PRESENTATION DATE: 11/24/2017 12:30 PM  IP ADMISSION DATE: 11/24/17 1409  DISCHARGE DATE: 12/5/2017  5:06 PM  DISPOSITION: Released to SNF/TCU/SNU [address] [place] [address] in the [facility] by Nima Chin for 2017  Network Utilization Review Department  Phone: [phone]; Fax [phone]  ATTENTION: The Network Utilization Review Department is now centralized for our 7 Facilities  Make a note that we have a new phone and fax numbers for our Department  Please call with any questions or concerns to [phone] and carefully follow the prompts so that you are directed to the right person  All voicemails are confidential  Fax any determinations, approvals, denials, and requests for initial or continue stay review clinical to [phone]  Due to HIGH CALL volume, it would be easier if you could please send faxed requests to expedite your requests and in part, help us provide discharge notifications faster

## 2017-12-07 ENCOUNTER — TELEPHONE (OUTPATIENT)
Dept: CASE MANAGEMENT | Facility: HOSPITAL | Age: 82
End: 2017-12-07

## 2017-12-18 ENCOUNTER — GENERIC CONVERSION - ENCOUNTER (OUTPATIENT)
Dept: OTHER | Facility: OTHER | Age: 82
End: 2017-12-18

## 2017-12-20 DIAGNOSIS — R53.81 OTHER MALAISE: ICD-10-CM

## 2017-12-29 ENCOUNTER — LAB REQUISITION (OUTPATIENT)
Dept: LAB | Facility: HOSPITAL | Age: 82
End: 2017-12-29
Payer: COMMERCIAL

## 2017-12-29 DIAGNOSIS — I10 ESSENTIAL (PRIMARY) HYPERTENSION: ICD-10-CM

## 2017-12-29 DIAGNOSIS — E78.5 HYPERLIPIDEMIA: ICD-10-CM

## 2017-12-29 LAB
ALBUMIN SERPL BCP-MCNC: 2.1 G/DL (ref 3.5–5)
ANION GAP SERPL CALCULATED.3IONS-SCNC: 7 MMOL/L (ref 4–13)
BUN SERPL-MCNC: 7 MG/DL (ref 5–25)
CALCIUM ALBUM COR SERPL-MCNC: 11.9 MG/DL (ref 8.3–10.1)
CALCIUM SERPL-MCNC: 10.4 MG/DL (ref 8.3–10.1)
CALCIUM SERPL-MCNC: 10.4 MG/DL (ref 8.3–10.1)
CHLORIDE SERPL-SCNC: 103 MMOL/L (ref 100–108)
CO2 SERPL-SCNC: 29 MMOL/L (ref 21–32)
CREAT SERPL-MCNC: 0.54 MG/DL (ref 0.6–1.3)
ERYTHROCYTE [DISTWIDTH] IN BLOOD BY AUTOMATED COUNT: 13.5 % (ref 11.6–15.1)
GFR SERPL CREATININE-BSD FRML MDRD: 102 ML/MIN/1.73SQ M
GLUCOSE SERPL-MCNC: 87 MG/DL (ref 65–140)
HCT VFR BLD AUTO: 31.6 % (ref 34.8–46.1)
HGB BLD-MCNC: 9.5 G/DL (ref 11.5–15.4)
MCH RBC QN AUTO: 26.4 PG (ref 26.8–34.3)
MCHC RBC AUTO-ENTMCNC: 30.1 G/DL (ref 31.4–37.4)
MCV RBC AUTO: 88 FL (ref 82–98)
PLATELET # BLD AUTO: 559 THOUSANDS/UL (ref 149–390)
PMV BLD AUTO: 10.5 FL (ref 8.9–12.7)
POTASSIUM SERPL-SCNC: 4 MMOL/L (ref 3.5–5.3)
RBC # BLD AUTO: 3.6 MILLION/UL (ref 3.81–5.12)
SODIUM SERPL-SCNC: 139 MMOL/L (ref 136–145)
WBC # BLD AUTO: 9.09 THOUSAND/UL (ref 4.31–10.16)

## 2017-12-29 PROCEDURE — 80048 BASIC METABOLIC PNL TOTAL CA: CPT | Performed by: INTERNAL MEDICINE

## 2017-12-29 PROCEDURE — 82040 ASSAY OF SERUM ALBUMIN: CPT | Performed by: INTERNAL MEDICINE

## 2017-12-29 PROCEDURE — 85027 COMPLETE CBC AUTOMATED: CPT | Performed by: INTERNAL MEDICINE

## 2017-12-29 PROCEDURE — 82310 ASSAY OF CALCIUM: CPT | Performed by: INTERNAL MEDICINE

## 2018-01-09 NOTE — RESULT NOTES
Verified Results  (1) COMPREHENSIVE METABOLIC PANEL [id number] 35:25IY Tanesha Rubio Order Number: [id number]     Test Name Result Flag Reference   GLUCOSE,RANDM 88 mg/dL     If the patient is fasting, the ADA then defines impaired fasting glucose as > 100 mg/dL and diabetes as > or equal to 123 mg/dL  SODIUM 144 mmol/L  136-145   POTASSIUM 3 5 mmol/L  3 5-5 3   CHLORIDE 107 mmol/L  100-108   CARBON DIOXIDE 30 mmol/L  21-32   ANION GAP (CALC) 7 mmol/L  4-13   BLOOD UREA NITROGEN 13 mg/dL  5-25   CREATININE 0 84 mg/dL  0 60-1 30   Standardized to IDMS reference method   CALCIUM 10 1 mg/dL  8 3-10 1   BILI, TOTAL 0 40 mg/dL  0 20-1 00   ALK PHOSPHATAS 91 U/L     ALT (SGPT) 19 U/L  12-78   AST(SGOT) 20 U/L  5-45   ALBUMIN 3 8 g/dL  3 5-5 0   TOTAL PROTEIN 7 7 g/dL  6 4-8 2   eGFR Non-African American      >60 0 ml/min/1 73sq [facility] Disease Education Program recommendations are as follows:  GFR calculation is accurate only with a steady state creatinine  Chronic Kidney disease less than 60 ml/min/1 73 sq  meters  Kidney failure less than 15 ml/min/1 73 sq  meters  (1) TSH WITH FT4 REFLEX [id number] 02:15PM Tanesha Rubio Order Number: [id number]     Test Name Result Flag Reference   TSH 1 716 uIU/mL  0 358-3 740   Patients undergoing fluorescein dye angiography may retain small amounts of fluorescein in the body for 48-72 hours post procedure  Samples containing fluorescein can produce falsely depressed TSH values  If the patient had this procedure,a specimen should be resubmitted post fluorescein clearance            The recommended reference ranges for TSH during pregnancy are as follows:  First trimester 0 1 to 2 5 uIU/mL  Second trimester  0 2 to 3 0 uIU/mL  Third trimester 0 3 to 3 0 uIU/m     (1) PTH N-TERMINAL (INTACT) [id number] 02:15PM Francisco Javier Berrios    Order Number: [id number]     Test Name Result Flag Reference   PARATHYROID HORMONE INTACT 85 6 pg/mL H 14 0-72 0

## 2018-01-10 NOTE — RESULT NOTES
Verified Results  * DXA BONE DENSITY SPINE HIP AND PELVIS 21Oct2016 10:01AM Zahira Burows     Test Name Result Flag Reference   DXA BONE DENSITY SPINE HIP AND PELVIS (Report)     CENTRAL DXA SCAN     CLINICAL HISTORY:  80year old Aferican-American female risk factors include history of smoking  TECHNIQUE: Bone densitometry was performed using a Horizon A bone densitometer  Regions of interest appear properly placed  There are no obvious fractures or other confounding variables which could limit the study  COMPARISON: None  RESULTS:    LUMBAR SPINE: L1-L4:   BMD 0 907 gm/cm2   T-score -1 3   Z-score 0 8     LEFT TOTAL HIP:   BMD 0 791 gm/cm2   T-score -1 2   Z-score 0 0     LEFT FEMORAL NECK:   BMD 0 558 gm/cm2   T-score -2 6   Z-score -0 9             IMPRESSION:   1  Based on the [facility] classification, the T-score of -2 6 in the left femoral neck is consistent with osteoporosis  2  According to the [address] [place] [address], prescription therapy is recommended with a T-score of -2 5 or less in the spine or hip after appropriate evaluation to exclude secondary causes  3  A daily intake of at least 1200 mg Calcium and 800 to 1000 IU of Vitamin D, as well as weight bearing and muscle strengthening exercise, fall prevention and avoidance of tobacco and excessive alcohol intake as basic preventive measures are    suggested  4  Repeat DXA in 18 - 24 months, on the same machine, as clinically indicated  The 10 year risk of hip fracture is 7 1%, with the 10 year risk of major osteoporotic fracture being 20%, as calculated by the [facility] fracture risk assessment tool (FRAX)  The current NOF guidelines recommend treating patients with FRAX 10 year risk score    of >3% for hip fracture and >20% for major osteoporotic fracture        WHO CLASSIFICATION:   Normal (a T-score of -1 0 or higher)   Low bone mineral density (a T-score of less than -1 0 but higher than -2 5)   Osteoporosis (a T-score of -2 5 or less)   Severe osteoporosis (a T-score of -2 5 or less with a fragility fracture)             Workstation performed: [id number]     Signed by:   Linette Torres MD   10/21/16

## 2018-01-10 NOTE — RESULT NOTES
Verified Results  US HEAD NECK SOFT TISSUE [id number] 10:14AM Ana De Souza    Order Number: [id number]    - Patient Instructions: To schedule this appointment, please contact Central Scheduling at [phone]  Test Name Result Flag Reference   US HEAD NECK SOFT TISSUE (Report)     SUPERFICIAL ULTRASOUND     INDICATION: Palpable area in the bilateral submandibular region  COMPARISON: None  TECHNIQUE:  Ultrasound of the neck was performed with a high frequency linear transducer in transverse and sagittal planes including volumetric imaging sweeps as well as traditional still imaging technique  FINDINGS:   Targeted sonographic evaluation of the areas of palpable concern in the bilateral submandibular regions demonstrates no suspicious mass or fluid collection  There are benign-appearing lymph nodes within the neck bilaterally  IMPRESSION:      No suspicious mass or fluid collection at the areas of palpable concern  If there is continued clinical concern further evaluation with contrast-enhanced neck CT could be considered         Workstation performed: [id number]     Signed by:   Laya Venegas MD   9/15/16

## 2018-01-10 NOTE — PSYCH
Psych Med Mgmt    Appearance: was calm and cooperative  Observed mood: mood appropriate  Observed mood: affect appropriate  Speech: a normal rate  Thought processes: coherent/organized  Hallucinations: no hallucinations present  Thought Content: no delusions  Abnormal Thoughts: The patient has no suicidal thoughts and no homicidal thoughts  Orientation: The patient is oriented to person, place and time  Recent and Remote Memory: short term memory intact and long term memory intact  Attention Span And Concentration: concentration intact  Insight: Limited insight  Judgment: Her judgment was intact  Treatment Recommendations: Continue on current medications and follow up in 3 months  She reports normal appetite, normal energy level, no weight change and normal number of sleep hours  Patient presenting for follow up accompanied by brother after discharge from [facility] on 3/10/2017  She came into the hospital for increased anxiety, as well as auditory hallucinations that were resulting in bizarre behavior  Patiet was stabilized on Risperdal inpatient and was discharged  Patient has been staying with brother since discharge and reports that she has been doing well  Not hearing any voices or music  She has been medication compliant, and brother noticies an improvement in her behavior and perception  She will be going back to her home in the coming day to live independently, and assures me that she will set alarms to make sure that she takes her medications as scheduled  Eating and sleeping well  Assessment    1  Psychosis (298 9) (F29)    Review of Systems    Constitutional: No fever, no chills, feels well, no tiredness, no recent weight gain or loss  Cardiovascular: no complaints of slow or fast heart rate, no chest pain, no palpitations  Respiratory: no complaints of shortness of breath, no wheezing, no dyspnea on exertion     Gastrointestinal: no complaints of abdominal pain, no constipation, no nausea, no diarrhea, no vomiting  Genitourinary: no complaints of dysuria, no incontinence, no pelvic pain, no urinary frequency  Musculoskeletal: no complaints of arthralgia, no myalgias, no limb pain, no joint stiffness  Integumentary: no complaints of skin rash, no itching, no dry skin  Neurological: no complaints of headache, no confusion, no numbness, no dizziness  Active Problems    1  Abnormal blood chemistry (790 6) (R79 9)   2  Anemia (285 9) (D64 9)   3  Auditory hallucinations (780 1) (R44 0)   4  Bereavement (V62 82) (Z63 4)   5  Constipation (564 00) (K59 00)   6  Edema (782 3) (R60 9)   7  Encounter for breast cancer screening other than mammogram (V76 10) (Z12 39)   8  Encounter for screening mammogram for malignant neoplasm of breast (V76 12)   (Z12 31)   9  Glaucoma (365 9) (H40 9)   10  Hallucinations, visual (368 16) (R44 1)   11  Hypercalcemia (275 42) (E83 52)   12  Hyperlipidemia (272 4) (E78 5)   13  Hypertension (401 9) (I10)   14  Hypokalemia (276 8) (E87 6)   15  Hypomagnesemia (275 2) (E83 42)   16  Impacted cerumen of right ear (380 4) (H61 21)   17  Lower back pain (724 2) (M54 5)   18  Mass of right side of neck (784 2) (R22 1)   19  Need for prophylactic vaccination and inoculation against influenza (V04 81) (Z23)   20  Need for vaccination for pneumococcus (V03 82) (Z23)   21  Osteoporosis (733 00) (M81 0)   22  Psychosis (298 9) (F29)   23  Screening for osteoporosis (V82 81) (Z13 820)   24  Special screening for malignant neoplasm of colon (V76 51) (Z12 11)   25  Vitamin D deficiency (268 9) (E55 9)    Past Medical History    1  History of Acute hepatitis c (070 51) (B17 10)   2  History of Allergic Reaction (995 3)   3  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)   4  History of Cellulitis of left leg (682 6) (L03 116)   5  History of Gallbladder disease (575 9) (K82 9)   6  History of Neck strain (847 0) (S16 1XXA)   7   History of Otitis externa, unspecified laterality    Allergies    1  Amoxicillin TABS   2  Dilantin CAPS   3  Erythromycin Base TABS    Current Meds   1  Aleve 220 MG Oral Capsule; Take 1 capsule twice daily; Therapy: 66Jgw0514 to (Evaluate:04Kfw9758); Last Rx:52Wky8460 Ordered   2  AmLODIPine Besylate 5 MG Oral Tablet; TAKE 1 AND 1/2 TABLETS DAILY; Therapy: 25IXT7903 to (Katia Franco)  Requested for: 62HSI5211; Last   Rx:67Rfs9543 Ordered   3  Atorvastatin Calcium 20 MG Oral Tablet; Take 1 tablet by mouth daily; Therapy: 86Eww9686 to (Evaluate:16Let0795)  Requested for: 12Ijy9253; Last   Rx:06Tvy0346 Ordered   4  Co Q 10 100 MG Oral Capsule; TAKE 1 CAPSULE TWICE DAILY; Therapy: 07TWF9831 to Recorded   5  Furosemide 20 MG Oral Tablet; TAKE 1 TABLET DAILY; Therapy: 90TVH8509 to (Evaluate:2017)  Requested for: 2016; Last   Rx:2016 Ordered   6  Losartan Potassium 100 MG Oral Tablet; TAKE 1 TABLET DAILY; Therapy: 35XJZ8182 to (Facundo Watkins)  Requested for: 69CPN6336; Last   Rx:2016 Ordered   7  Magnesium Oxide 400 MG Oral Capsule; take 1 capsule daily; Therapy: 55GWF9713 to (Evaluate:2017); Last Rx:13Voe5956 Ordered   8  [place]-3 Fish Oil 1000 MG Oral Capsule; TAKE 1 CAPSULE DAILY; Therapy: 14NPU0286 to [phone]) Recorded   9  Potassium Chloride Dahlia ER 20 MEQ Oral Tablet Extended Release; TAKE 1 TABLET   DAILY; Therapy: 45IKN5746 to (Evaluate:2017)  Requested for: 50PGL8759; Last   Rx:37Whw6578 Ordered   10  RisperiDONE 1 MG Oral Tablet; TAKE 1 TABLET EVERY MORNING AND 1 TABLET    EVERY EVENING; Therapy: 60SOK7438 to (Evaluate:04Ian0045); Last Rx:44Ocf6901 Ordered   11  Vitamin D 2000 UNIT Oral Capsule; take 1 capsule daily; Therapy: 70Mbq7074 to (Last Rx:61Ggm7926) Ordered    Family Psych History  Mother    1  Family history of Coronary Artery Disease (V17 49)   2  Family history of Mother  At Age 80  Daughter    3   Family history of Family Health Status Children 1  Living Daughters  Brother    4  Family history of Alcoholism   5  Family history of Heart Disease (V17 49)   6  Family history of Prostate Cancer (V16 42)    Social History    · Bereavement (N26 66) (Z63 4)   · Former smoker (V15 82) (C09 510)   · Marital History -  (V61 03)   · Never Drank Alcohol   · Occupation: Retired    End of Encounter Meds    1  Furosemide 20 MG Oral Tablet; TAKE 1 TABLET DAILY; Therapy: 06WVP5778 to (Evaluate:19Apr2017)  Requested for: 21Oct2016; Last   Rx:21Oct2016 Ordered    2  Co Q 10 100 MG Oral Capsule; TAKE 1 CAPSULE TWICE DAILY; Therapy: 54CWG7989 to Recorded   3  [place]-3 Fish Oil 1000 MG Oral Capsule; TAKE 1 CAPSULE DAILY; Therapy: 25GJA6144 to (Evaluate:27Jan2013) Recorded    4  Atorvastatin Calcium 20 MG Oral Tablet; Take 1 tablet by mouth daily; Therapy: 32Gfq5199 to (Evaluate:08Feb2017)  Requested for: 22Cgj7567; Last   Rx:43Xbn3577 Ordered    5  AmLODIPine Besylate 5 MG Oral Tablet; TAKE 1 AND 1/2 TABLETS DAILY; Therapy: 90HQM8818 to [phone])  Requested for: 09IYN0514; Last   Rx:38Nav3979 Ordered   6  Losartan Potassium 100 MG Oral Tablet; TAKE 1 TABLET DAILY; Therapy: 33WGI7812 to (Hector Alvarenga)  Requested for: 36OGY6765; Last   Rx:03Nov2016 Ordered    7  Magnesium Oxide 400 MG Oral Capsule; take 1 capsule daily; Therapy: 26HZP8345 to (Evaluate:08Mar2017); Last Rx:93Ugj7024 Ordered   8  Potassium Chloride Dahlia ER 20 MEQ Oral Tablet Extended Release; TAKE 1 TABLET   DAILY; Therapy: 44BLH3989 to (Evaluate:19Apr2017)  Requested for: 70SRQ6641; Last   Rx:21Oct2016 Ordered    9  RisperiDONE 1 MG Oral Tablet (RisperDAL); TAKE 1 TABLET EVERY MORNING AND 1   TABLET EVERY EVENING; Therapy: 27TPH4048 to (Evaluate:88Tvk4113); Last Rx:54Ylo6419 Ordered    10  Aleve 220 MG Oral Capsule; Take 1 capsule twice daily; Therapy: 04Iip7274 to (Evaluate:09Sep2014); Last Rx:03Sep2014 Ordered    11   Vitamin D 2000 UNIT Oral Capsule; take 1 capsule daily;     Therapy: 29SOL2811 to (Last Rx:76Pcy9051) Ordered    Future Appointments    Date/Time Provider Specialty Site   06/28/2017 10:00 AM Meagan Montoya MD Internal Medicine [facility] INTERNAL MED   06/13/2017 02:30 PM [facility] Psychiatry [address] [facility]     Signatures   Electronically signed by : Steffen Collazo [facility]; Mar 21 2017  4:51PM EST                       (Author)    Electronically signed by : Lynette Blum MD; Mar 21 2017  4:55PM EST

## 2018-01-11 NOTE — PROGRESS NOTES
Assessment    1  Auditory hallucinations (780 1) (R44 0)   2  Hallucinations, visual (368 16) (R44 1)   3  Bereavement (V62 82) (Z63 4)    Plan   Auditory hallucinations, Hallucinations, visual    · (1) COMPREHENSIVE METABOLIC PANEL; Status:Active; Requested for:02Feb2017; Auditory hallucinations, SocHx: Bereavement, Hallucinations, visual    · *1 - [address] Physician Referral  81 y/o F with symptoms as above    evaluate and treat  Status: Hold For - Scheduling  Requested for: 99TNI5038  Health Referral Questions : Patient requires Psychiatry assessment/intake      appointment (with MD/DO)  Care Summary provided  : Yes   · (1) CBC/PLT/DIFF; Status:Complete;   Done: 56LZA5031 02:15PM   · (1) PTH N-TERMINAL (INTACT); Status:Active; Requested for:02Feb2017;    · (1) RPR; Status:Active; Requested for:02Feb2017;    · (1) TSH WITH FT4 REFLEX; Status:Active; Requested for:02Feb2017;    · (1) URINALYSIS w URINE C/S REFLEX (will reflex a microscopy if leukocytes, occult blood, or  nitrites are not within normal limits); Status:Complete;   Done: 89PZN4311 02:15PM   · (Q) VITAMIN B12/FOLATE, SERUM PANEL; Status:Active; Requested for:02Feb2017;     (1) CBC/PLT/DIFF; Status:Resulted - Requires Verification;   Done: 13TYB3845 12:00AM  MZY:85XTY4262;CQIBXGW; Stat;    For:Auditory hallucinations, SocHx: Bereavement, Hallucinations, visual; Ordered By:Neal Delvalle;      Discussion/Summary  Discussion Summary:   1  see psychiatrist for evaluation  2  blood work today  3  return to see Dr Corene Snellen in 1 week  4  if symptoms worsen - go to Emergency Room  Medication SE Review and Pt Understands Tx: The treatment plan was reviewed with the patient/guardian  The patient/guardian understands and agrees with the treatment plan   Counseling Documentation With Imm: The patient was counseled regarding instructions for management, patient and family education, impressions        Chief Complaint  Chief Complaint Free Text Note Form: patient states she is hearing voices all day when she is home and when she goes out  Chief Complaint Chronic Condition [facility]: Patient is here today for follow up of chronic conditions described in HPI  History of Present Illness  HPI: 79 y/o F new to me here with complaints as above    symptoms ongoing for last 1 month - associated with auditory hallucinations - hearing music and voices and visual hallucinations including seeing people in her home last night - walking in kitchen and wearing pajamas  one of hallucinations tried to interact with her - "he tried to make out with me"    pt ran out of her home at 2am last night 2nd to seeing these [facility] in her home and stayed with neighbor overnight  denies any hx of depression/anxiety/SI/HI  never treated for the above in past  lost 2 brothers last month to medical illness - both were ill during this time/deaths were not sudden    lives at home on her own, drove herself to appt today  estranged from her daughter and grand-daughter  hearing voices/music in office & asked  staff what song is playing although no music is playing in office    denies headache, vision changes, rashes, N/V/abd pain/CP/SOB, GI or  complaints  no other complaints      Review of Systems  Complete-Female:   Constitutional: feeling tired, but no fever  Cardiovascular: no chest pain  Respiratory: no shortness of breath  Gastrointestinal: no abdominal pain  Genitourinary: no dysuria  Musculoskeletal: no arthralgias and no myalgias  Integumentary: no rashes  Neurological: no confusion  Psychiatric: sleep disturbances, emotional problems, disturbing or unusual thoughts, feelings, or sensations and magical thinking, but not suicidal, no anxiety and no depression  no feelings of weakness   ROS Reviewed:   ROS reviewed  Active Problems    1  Abnormal blood chemistry (790 6) (R79 9)   2  Anemia (285 9) (D64 9)   3  Constipation (564 00) (K59 00)   4  Edema (782 3) (R60 9)   5  Encounter for breast cancer screening other than mammogram (V76 10) (Z12 39)   6  Encounter for screening mammogram for malignant neoplasm of breast (V76 12) (Z12 31)   7  Glaucoma (365 9) (H40 9)   8  Hypercalcemia (275 42) (E83 52)   9  Hyperlipidemia (272 4) (E78 5)   10  Hypertension (401 9) (I10)   11  Hypokalemia (276 8) (E87 6)   12  Hypomagnesemia (275 2) (E83 42)   13  Impacted cerumen of right ear (380 4) (H61 21)   14  Lower back pain (724 2) (M54 5)   15  Mass of right side of neck (784 2) (R22 1)   16  Need for prophylactic vaccination and inoculation against influenza (V04 81) (Z23)   17  Need for vaccination for pneumococcus (V03 82) (Z23)   18  Osteoporosis (733 00) (M81 0)   19  Screening for osteoporosis (V82 81) (Z13 820)   20  Special screening for malignant neoplasm of colon (V76 51) (Z12 11)   21  Vitamin D deficiency (268 9) (E55 9)    Past Medical History    1  History of Acute hepatitis c (070 51) (B17 10)   2  History of Allergic Reaction (995 3)   3  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)   4  History of Cellulitis of left leg (682 6) (L03 116)   5  History of Gallbladder disease (575 9) (K82 9)   6  History of Neck strain (847 0) (S16 1XXA)   7  History of Otitis externa, unspecified laterality  Active Problems And Past Medical History Reviewed: The active problems and past medical history were reviewed and updated today  Surgical History    1  History of Aneurysm Repair Intracran Complex Verteb-Basilar Circulation   2  History of Appendectomy   3  History of Cholecystectomy   4  History of Total Abdominal Hysterectomy    Family History  Mother    1  Family history of Coronary Artery Disease (V17 49)   2  Family history of Mother  At Age 80  Daughter    3  Family history of Family Health Status Children 1  Living Daughters  Brother    4  Family history of Alcoholism   5  Family history of Heart Disease (V17 49)   6   Family history of Prostate Cancer (J72 79)    Social History    · Bereavement (X57 40) (Z63 4)   · Former smoker (T05 19) (S52 331)   · Marital History -  (V61 03)   · Never Drank Alcohol   · Occupation: Retired  Social History Reviewed: The social history was reviewed and updated today  Current Meds   1  Aleve 220 MG Oral Capsule; Take 1 capsule twice daily; Therapy: 74Ysl0494 to (Evaluate:73Pqp1988); Last Rx:45Yvn3129 Ordered   2  AmLODIPine Besylate 5 MG Oral Tablet; TAKE 1 AND 1/2 TABLETS DAILY; Therapy: 93PBO1158 to (Javier Fiore)  Requested for: 55UQC5602; Last Rx:98Ztr0805   Ordered   3  Atorvastatin Calcium 20 MG Oral Tablet; Take 1 tablet by mouth daily; Therapy: 17Dmj4007 to (Evaluate:08Feb2017)  Requested for: 02Dvq8451; Last Rx:28Dtn8121   Ordered   4  Co Q 10 100 MG Oral Capsule; TAKE 1 CAPSULE TWICE DAILY; Therapy: 24KHM1739 to Recorded   5  Furosemide 20 MG Oral Tablet; TAKE 1 TABLET DAILY; Therapy: 14SJZ3750 to (Evaluate:19Apr2017)  Requested for: 21Oct2016; Last Rx:29Jmm8533   Ordered   6  Losartan Potassium 100 MG Oral Tablet; TAKE 1 TABLET DAILY; Therapy: 84UKI3602 to (Huy Stains)  Requested for: 90DOZ6602; Last Rx:00Njp9367   Ordered   7  Magnesium Oxide 400 MG Oral Capsule; take 1 capsule daily; Therapy: 28OUK8635 to (Evaluate:08Mar2017); Last Rx:58Nam3115 Ordered   8  [place]-3 Fish Oil 1000 MG Oral Capsule; TAKE 1 CAPSULE DAILY; Therapy: 90HPI0577 to (9593 5058) Recorded   9  Potassium Chloride Dahlia ER 20 MEQ Oral Tablet Extended Release; TAKE 1 TABLET DAILY; Therapy: 86YRL7987 to (Evaluate:19Apr2017)  Requested for: 04WLS0966; Last Rx:76Jxp8645   Ordered   10  Vitamin D 2000 UNIT Oral Capsule; take 1 capsule daily; Therapy: 35QZZ9978 to (Last Rx:57Swu5572) Ordered  Medication List Reviewed: The medication list was reviewed and updated today  Allergies    1   Dilantin CAPS    Vitals  Vital Signs    Recorded: 76Vqy6614 01:27PM Recorded: 96HDB1351 12: 59PM   Temperature  97 9 F   Heart Rate  84   Respiration  18   Systolic 635 931   Diastolic 80 82   Height  5 ft 5 5 in   Weight  150 lb 6 08 oz   BMI Calculated  24 64   BSA Calculated  1 76   O2 Saturation  99     Physical Exam    Constitutional   General appearance: No acute distress, well appearing and well nourished  Eyes   Pupils and irises: Equal, round, reactive to light  Ears, Nose, Mouth, and Throat   Oropharynx: Normal with no erythema, edema, exudate or lesions  Pulmonary   Respiratory effort: No increased work of breathing or signs of respiratory distress  Auscultation of lungs: Clear to auscultation  Cardiovascular   Auscultation of heart: Normal rate and rhythm, normal S1 and S2, no murmurs  Examination of extremities for edema and/or varicosities: Normal   no edema  Abdomen   Abdomen: Non-tender, no masses  Psychiatric   Judgment and insight: Abnormal   Judgment: intact insight  Abnormal Thoughts: hallucinations which were auditory and visual    Mood and affect: Abnormal   Mood and Affect: anxious and concerned  Results/Data  (1) CBC/PLT/DIFF 79JHU3579 02:15PM María Hamilton    Order Number: [id number]     Test Name Result Flag Reference   WBC COUNT 4 66 Thousand/uL  4 31-10 16   RBC COUNT 4 87 Million/uL  3 81-5 12   HEMOGLOBIN 13 5 g/dL  11 5-15 4   HEMATOCRIT 42 2 %  34 8-46  1   MCV 87 fL  82-98   MCH 27 7 pg  26 8-34 3   MCHC 32 0 g/dL  31 4-37 4   RDW 13 9 %  11 6-15 1   MPV 11 0 fL  8 9-12 7   PLATELET COUNT 417 Thousands/uL  149-390   NEUTROPHILS RELATIVE PERCENT 50 %  43-75   LYMPHOCYTES RELATIVE PERCENT 34 %  14-44   MONOCYTES RELATIVE PERCENT 16 % H 4-12   EOSINOPHILS RELATIVE PERCENT 0 %  0-6   BASOPHILS RELATIVE PERCENT 0 %  0-1   NEUTROPHILS ABSOLUTE COUNT 2 30 Thousands/?L  1 85-7 62   LYMPHOCYTES ABSOLUTE COUNT 1 58 Thousands/?L  0 60-4 47   MONOCYTES ABSOLUTE COUNT 0 76 Thousand/?L  0 17-1 22   EOSINOPHILS ABSOLUTE COUNT 0 00 Thousand/?L 0  00-0 61   BASOPHILS ABSOLUTE COUNT 0 02 Thousands/?L  0 00-0 10   - Patient Instructions: This bloodwork is non-fasting  Please drink two glasses of water morning of bloodwork  - Patient Instructions: This bloodwork is non-fasting  Please drink two glasses of water morning of bloodwork  (1) URINALYSIS w URINE C/S REFLEX (will reflex a microscopy if leukocytes, occult blood, or nitrites are not within normal limits) 26XQC7520 02:15PM Mialeanne Kurtz    Order Number: [id number]     Test Name Result Flag Reference   COLOR Yellow     CLARITY Clear     PH UA 6 0  4 5-8 0   LEUKOCYTE ESTERASE UA Negative  Negative   NITRITE UA Negative  Negative   PROTEIN UA Negative mg/dl  Negative   GLUCOSE UA Negative mg/dl  Negative   KETONES UA Negative mg/dl  Negative   UROBILINOGEN UA 1 0 E U /dl  0 2, 1 0 E U /dl   BILIRUBIN UA Negative  Negative   BLOOD UA Negative  Negative   SPECIFIC GRAVITY UA >=1 030  1 003-1 030       Provider Comments  Provider Comments:   called and spoke Dr Ross Castleman who agreed with lab work/testing and expedited psych eval    message sent to [facility] to schedule pt for expedited appt  BW ordered, advised to f/u with PCP in 1 week and to see [facility] given      Health Management  Encounter for breast cancer screening other than mammogram   Digital Bilateral Screening Mammogram With CAD; every 1 year; Last 99PVI5194; Next Due:  70BXR1584; Active  Health Maintenance   Medicare Annual Wellness Visit; every 1 year; Next Due: 96DLM6239;  Overdue    Future Appointments    Date/Time Provider Specialty Site   02/09/2017 10:30 AM Shravan Epps, Nurse Schedule  [facility] INTERNAL MED   02/07/2017 03:30 PM Renata Hdez, [facility] Psychiatry [facility]     Verified Results  (1) CBC/PLT/DIFF 81XOL1940 02:15PM Breanne Molina Order Number: [id number]   [Feb 2, 2017 7:54PM [employer]  message sent to contact patient with results     Test Name Result Flag Reference WBC COUNT 4 66 Thousand/uL  4 31-10 16   RBC COUNT 4 87 Million/uL  3 81-5 12   HEMOGLOBIN 13 5 g/dL  11 5-15 4   HEMATOCRIT 42 2 %  34 8-46  1   MCV 87 fL  82-98   MCH 27 7 pg  26 8-34 3   MCHC 32 0 g/dL  31 4-37 4   RDW 13 9 %  11 6-15 1   MPV 11 0 fL  8 9-12 7   PLATELET COUNT 723 Thousands/uL  149-390   NEUTROPHILS RELATIVE PERCENT 50 %  43-75   LYMPHOCYTES RELATIVE PERCENT 34 %  14-44   MONOCYTES RELATIVE PERCENT 16 % H 4-12   EOSINOPHILS RELATIVE PERCENT 0 %  0-6   BASOPHILS RELATIVE PERCENT 0 %  0-1   NEUTROPHILS ABSOLUTE COUNT 2 30 Thousands/?L  1 85-7 62   LYMPHOCYTES ABSOLUTE COUNT 1 58 Thousands/?L  0 60-4 47   MONOCYTES ABSOLUTE COUNT 0 76 Thousand/?L  0 17-1 22   EOSINOPHILS ABSOLUTE COUNT 0 00 Thousand/?L  0 00-0 61   BASOPHILS ABSOLUTE COUNT 0 02 Thousands/?L  0 00-0 10   - Patient Instructions: This bloodwork is non-fasting  Please drink two glasses of water morning of bloodwork  - Patient Instructions: This bloodwork is non-fasting  Please drink two glasses of water morning of bloodwork       (1) URINALYSIS w URINE C/S REFLEX (will reflex a microscopy if leukocytes, occult blood, or nitrites are not within normal limits) 13CHL1027 02:15PM Siena CARRASQUILLO Order Number: [id number]   [Feb 2, 2017 7:55PM Neal Delvalle]  message sent to contact patient with results     Test Name Result Flag Reference   COLOR Yellow     CLARITY Clear     PH UA 6 0  4 5-8 0   LEUKOCYTE ESTERASE UA Negative  Negative   NITRITE UA Negative  Negative   PROTEIN UA Negative mg/dl  Negative   GLUCOSE UA Negative mg/dl  Negative   KETONES UA Negative mg/dl  Negative   UROBILINOGEN UA 1 0 E U /dl  0 2, 1 0 E U /dl   BILIRUBIN UA Negative  Negative   BLOOD UA Negative  Negative   SPECIFIC GRAVITY UA >=1 030  1 003-1 030       Signatures   Electronically signed by Delicia Fonseca DO; Feb 2 2017  8:10PM EST                       (Author)

## 2018-01-11 NOTE — RESULT NOTES
Verified Results  (1) RPR 48MPB8988 02:15PM Aldo Bravo Order Number: [id number]     Test Name Result Flag Reference   RPR Non-Reactive  Non-Reactive
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
## 2018-01-11 NOTE — RESULT NOTES
Verified Results  (1) COMPREHENSIVE METABOLIC PANEL 92QUN3147 61:14EX [employer]    Order Number: [id number]     Test Name Result Flag Reference   SODIUM 146 mmol/L H 136-145   POTASSIUM 3 3 mmol/L L 3 5-5 3   CHLORIDE 109 mmol/L H 100-108   CARBON DIOXIDE 28 mmol/L  21-32   ANION GAP (CALC) 9 mmol/L  4-13   BLOOD UREA NITROGEN 13 mg/dL  5-25   CREATININE 1 16 mg/dL  0 60-1 30   Standardized to IDMS reference method   CALCIUM 10 1 mg/dL  8 3-10 1   BILI, TOTAL 0 40 mg/dL  0 20-1 00   ALK PHOSPHATAS 63 U/L     ALT (SGPT) 16 U/L  12-78   Specimen collection should occur prior to Sulfasalazine administration due to the potential for falsely depressed results  AST(SGOT) 14 U/L  5-45   Specimen collection should occur prior to Sulfasalazine administration due to the potential for falsely depressed results  ALBUMIN 3 9 g/dL  3 5-5 0   TOTAL PROTEIN 7 6 g/dL  6 4-8 2   eGFR 51 ml/min/1 73sq m     [facility] Disease Education Program recommendations are as follows:  GFR calculation is accurate only with a steady state creatinine  Chronic Kidney disease less than 60 ml/min/1 73 sq  meters  Kidney failure less than 15 ml/min/1 73 sq  meters  GLUCOSE FASTING 100 mg/dL H 65-99   Specimen collection should occur prior to Sulfasalazine administration due to the potential for falsely depressed results  Specimen collection should occur prior to Sulfapyridine administration due to the potential for falsely elevated results       (1) PHOSPHORUS 11ZCR8232 12:45PM [employer] Order Number: [id number]     Test Name Result Flag Reference   PHOSPHORUS 2 2 mg/dL L 2 3-4 1     (1) PTH N-TERMINAL (INTACT) 71WCC9712 12:45PM [employer] Order Number: [id number]     Test Name Result Flag Reference   PARATHYROID HORMONE INTACT 128 5 pg/mL H 14 0-72 0

## 2018-01-13 VITALS
SYSTOLIC BLOOD PRESSURE: 134 MMHG | HEIGHT: 66 IN | OXYGEN SATURATION: 97 % | WEIGHT: 139.25 LBS | BODY MASS INDEX: 22.38 KG/M2 | RESPIRATION RATE: 18 BRPM | HEART RATE: 92 BPM | TEMPERATURE: 97.8 F | DIASTOLIC BLOOD PRESSURE: 68 MMHG

## 2018-01-13 VITALS
OXYGEN SATURATION: 96 % | DIASTOLIC BLOOD PRESSURE: 82 MMHG | HEART RATE: 80 BPM | SYSTOLIC BLOOD PRESSURE: 140 MMHG | WEIGHT: 137 LBS | TEMPERATURE: 96.5 F | RESPIRATION RATE: 16 BRPM | BODY MASS INDEX: 22.11 KG/M2

## 2018-01-13 VITALS
SYSTOLIC BLOOD PRESSURE: 150 MMHG | OXYGEN SATURATION: 99 % | DIASTOLIC BLOOD PRESSURE: 80 MMHG | HEART RATE: 84 BPM | HEIGHT: 66 IN | WEIGHT: 150.38 LBS | BODY MASS INDEX: 24.17 KG/M2 | TEMPERATURE: 97.9 F | RESPIRATION RATE: 18 BRPM

## 2018-01-13 NOTE — RESULT NOTES
Verified Results  VAS LOWER LIMB VENOUS DUPLEX STUDY, COMPLETE BILATERAL 62Gas2266 01:58PM Yaquelin Monge Weiss     Test Name Result Flag Reference   VAS LOWER LIMB VENOUS DUPLEX STUDY, COMPLETE BILATERAL (Report)     [facility] REPORT   CLINICAL:   Indications: Bilateral Edema, unspecified [R60 9]  The patient presents with   bilateral leg swelling x 2 months  R>L   Operative History:   appendectomy   Cholecystectomy   Risk Factors: The patient has no history of obesity, DVT, limb trauma or   malignancy  Clinical:Left Lower Limb   There is edema  Right Lower Limb   There is edema  CONCLUSION:   Impression:   RIGHT LOWER LIMB:   No evidence of acute or chronic deep vein thrombosis  No evidence of superficial thrombophlebitis noted  Doppler evaluation shows a normal response to augmentation maneuvers  Popliteal, posterior tibial and anterior tibial arterial Doppler waveforms are   monophasic  LEFT LOWER LIMB:   No evidence of acute or chronic deep vein thrombosis  No evidence of superficial thrombophlebitis noted  Doppler evaluation shows a normal response to augmentation maneuvers  Popliteal, posterior tibial and anterior tibial arterial Doppler waveforms are   triphasic        SIGNATURE:   Electronically Signed by: Gretchen Ledesma MD on 2016-09-20 10:16:38 PM

## 2018-01-13 NOTE — RESULT NOTES
Verified Results  (1) LIPID PANEL, FASTING 80FAV9030 10:08AM [employer] Order Number: [id number]     Test Name Result Flag Reference   CHOLESTEROL 164 mg/dL     HDL,DIRECT 53 mg/dL  40-60   Specimen collection should occur prior to Metamizole administration due to the potential for falsley depressed results  LDL CHOLESTEROL CALCULATED 91 mg/dL  0-100   Triglyceride:        Normal <150 mg/dl   Borderline High 150-199 mg/dl   High 200-499 mg/dl   Very High >499 mg/dl      Cholesterol:       Desirable <200 mg/dl    Borderline High 200-239 mg/dl    High >239 mg/dl      HDL Cholesterol:       High>59 mg/dL    Low <41 mg/dL      This screening LDL is a calculated result  It does not have the accuracy of the Direct Measured LDL in the monitoring of patients with hyperlipidemia and/or statin therapy  Direct Measure LDL (OMD406) must be ordered separately in these patients  TRIGLYCERIDES 100 mg/dL  <=150   Specimen collection should occur prior to N-Acetylcysteine or Metamizole administration due to the potential for falsely depressed results  (1) COMPREHENSIVE METABOLIC PANEL 74EMZ0339 60:03QQ [employer] Order Number: [id number]     Test Name Result Flag Reference   SODIUM 141 mmol/L  136-145   POTASSIUM 3 4 mmol/L L 3 5-5 3   CHLORIDE 106 mmol/L  100-108   CARBON DIOXIDE 29 mmol/L  21-32   ANION GAP (CALC) 6 mmol/L  4-13   BLOOD UREA NITROGEN 8 mg/dL  5-25   CREATININE 0 92 mg/dL  0 60-1 30   Standardized to IDMS reference method   CALCIUM 10 2 mg/dL H 8 3-10 1   BILI, TOTAL 0 50 mg/dL  0 20-1 00   ALK PHOSPHATAS 60 U/L     ALT (SGPT) 17 U/L  12-78   Specimen collection should occur prior to Sulfasalazine administration due to the potential for falsely depressed results  AST(SGOT) 16 U/L  5-45   Specimen collection should occur prior to Sulfasalazine administration due to the potential for falsely depressed results     ALBUMIN 3 8 g/dL  3 5-5 0 TOTAL PROTEIN 7 2 g/dL  6 4-8 2   CORRECTED CALCIUM 10 4 mg/dL H 8 3-10 1   eGFR 67 ml/min/1 73sq m     [facility] Disease Education Program recommendations are as follows:  GFR calculation is accurate only with a steady state creatinine  Chronic Kidney disease less than 60 ml/min/1 73 sq  meters  Kidney failure less than 15 ml/min/1 73 sq  meters  GLUCOSE FASTING 93 mg/dL  65-99   Specimen collection should occur prior to Sulfasalazine administration due to the potential for falsely depressed results  Specimen collection should occur prior to Sulfapyridine administration due to the potential for falsely elevated results  (1) CBC/PLT/DIFF 37HJW1106 10:08AM Iris Fregoso Order Number: [id number]     Test Name Result Flag Reference   WBC COUNT 5 00 Thousand/uL  4 31-10 16   RBC COUNT 4 01 Million/uL  3 81-5 12   HEMOGLOBIN 11 1 g/dL L 11 5-15 4   HEMATOCRIT 34 3 % L 34 8-46  1   MCV 86 fL  82-98   MCH 27 7 pg  26 8-34 3   MCHC 32 4 g/dL  31 4-37 4   RDW 14 1 %  11 6-15 1   MPV 10 7 fL  8 9-12 7   PLATELET COUNT 740 Thousands/uL  149-390   NEUTROPHILS RELATIVE PERCENT 63 %  43-75   LYMPHOCYTES RELATIVE PERCENT 21 %  14-44   MONOCYTES RELATIVE PERCENT 16 % H 4-12   EOSINOPHILS RELATIVE PERCENT 0 %  0-6   BASOPHILS RELATIVE PERCENT 0 %  0-1   NEUTROPHILS ABSOLUTE COUNT 3 13 Thousands/? ??L  1 85-7 62   LYMPHOCYTES ABSOLUTE COUNT 1 06 Thousands/? ??L  0 60-4 47   MONOCYTES ABSOLUTE COUNT 0 80 Thousand/? ??L  0 17-1 22   EOSINOPHILS ABSOLUTE COUNT 0 00 Thousand/? ??L  0 00-0 61   BASOPHILS ABSOLUTE COUNT 0 01 Thousands/? ??L  0 00-0 10     (1) TSH 16Oct2017 10:08AM [facility] Order Number: [id number]     Test Name Result Flag Reference   TSH 3 195 uIU/mL  0 358-3 740   Patients undergoing fluorescein dye angiography may retain small amounts of fluorescein in the body for 48-72 hours post procedure  Samples containing fluorescein can produce falsely depressed TSH values  If the patient had this procedure,a specimen should be resubmitted post fluorescein clearance  The recommended reference ranges for TSH during pregnancy are as follows:  First trimester 0 1 to 2 5 uIU/mL  Second trimester  0 2 to 3 0 uIU/mL  Third trimester 0 3 to 3 0 uIU/m     (1) VITAMIN D 25-HYDROXY 16Oct2017 10:08AM Zahira Ortega    Order Number: [id number]     Test Name Result Flag Reference   VIT D 25-HYDROX 26 1 ng/mL L 30 0-100 0   This assay is a certified procedure of the CDC Vitamin D Standardization Certification Program (VDSCP)     Deficiency <20ng/ml   Insufficiency 20-30ng/ml   Sufficient  ng/ml     *Patients undergoing fluorescein dye angiography may retain small amounts of fluorescein in the body for 48-72 hours post procedure  Samples containing fluorescein can produce falsely elevated Vitamin D values  If the patient had this procedure, a specimen should be resubmitted post fluorescein clearance       (1) VITAMIN B12 16Oct2017 10:08AM Ajay Daniel Order Number: [id number]     Test Name Result Flag Reference   VITAMIN B12 627 pg/mL  100-900     (1) PTH N-TERMINAL (INTACT) [id number] 10:08AM Ajay Arias Order Number: [id number]     Test Name Result Flag Reference   PARATHYROID HORMONE INTACT 76 7 pg/mL H 14 0-72 0

## 2018-01-13 NOTE — PSYCH
Psych Med Mgmt    Appearance: was calm and cooperative  Observed mood: elevated  Observed mood: affect appropriate  Speech: a normal rate  Thought processes: disorganized  Hallucinations: auditory hallucinations  Thought Content: paranoid ideation  Abnormal Thoughts: The patient has no suicidal thoughts and no homicidal thoughts  Orientation: The patient is oriented to person, place and time  Recent and Remote Memory: long term memory intact  Attention Span And Concentration: concentration impaired  Insight: Limited insight  Judgment: Her judgment was limited  Treatment Recommendations: Encouraged patient to take medications consistently  Talked about setting alarms to remind her to take them  Want to re-assess patient in 2 weeks, with hope of improvement with medication compliance  She reports normal appetite, normal energy level, no weight change and decrease in number of sleep hours   Patient accompanied by brother for follow up for psychosis, including auditory hallucinations  Brother offers concerns that patient is not taking medications consistently, and behavior has been becoming more concerning  For 10 days following appointment, patient stayed at brother's house- he made sure that patient was taking medication, and for that time period, auditory hallucinations subsided  Patient was sleeping through the night and was not panicking throughout the day  Since returning home last week, she has been non-compliant and voices have returned  She called the police last week, as well as this morning reporting that there was someone breaking into her house  Police showed up and found nothing- officer spoke with brother when brother arrived at the scene and officers asked if brother thought she needed to be admitted to [facility] for psychiatric evaluation   At this point, brother is understandably reluctant to send patient for inpatient treatment, but is aware of the consequences of these actions, and has tried to impress that upon patient  Brother says that patient has a daughter with whom she has not spoken to for 3 years, and believes that daughter needs to be involved in care since he can only do so much as a sibling  Of note, also reports that patient is a hoarder  Appetite is still good, but sleep has been interrupted lately secondary to auditory hallucinations that awaken and scare patient  Patient is minimizing her condition and laughs inappropriately because she "thinks that its so crazy that its funny " Explained to patient that medication compliance is important to her well being and mental stability  Assessment    1  Psychosis (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 9) (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)    Review of Systems    Constitutional: No fever, no chills, feels well, no tiredness, no recent weight gain or loss  Cardiovascular: no complaints of slow or fast heart rate, no chest pain, no palpitations  Respiratory: no complaints of shortness of breath, no wheezing, no dyspnea on exertion  Gastrointestinal: no complaints of abdominal pain, no constipation, no nausea, no diarrhea, no vomiting  Genitourinary: no complaints of dysuria, no incontinence, no pelvic pain, no urinary frequency  Musculoskeletal: no complaints of arthralgia, no myalgias, no limb pain, no joint stiffness  Integumentary: no complaints of skin rash, no itching, no dry skin  Neurological: no complaints of headache, no confusion, no numbness, no dizziness  Active Problems    1  Abnormal blood chemistry (790 6) (R79 9)   2  Anemia (285 9) (D64 9)   3  Auditory hallucinations (780 1) (R44 0)   4  Bereavement (V62 82) (Z63 4)   5  Constipation (564 00) (K59 00)   6  Edema (782 3) (R60 9)   7  Encounter for breast cancer screening other than mammogram (V76 10) (Z12 39)   8  Encounter for screening mammogram for malignant neoplasm of breast (V76 12)   (Z12 31)   9  Glaucoma (365 9) (H40 9)   10  Hallucinations, visual (368 16) (R44 1)   11   Hypercalcemia (275 42) (E83 52)   12  Hyperlipidemia (272 4) (E78 5)   13  Hypertension (401 9) (I10)   14  Hypokalemia (276 8) (E87 6)   15  Hypomagnesemia (275 2) (E83 42)   16  Impacted cerumen of right ear (380 4) (H61 21)   17  Lower back pain (724 2) (M54 5)   18  Mass of right side of neck (784 2) (R22 1)   19  Need for prophylactic vaccination and inoculation against influenza (V04 81) (Z23)   20  Need for vaccination for pneumococcus (V03 82) (Z23)   21  Osteoporosis (733 00) (M81 0)   22  Psychosis (298 9) (F29)   23  Screening for osteoporosis (V82 81) (Z13 820)   24  Special screening for malignant neoplasm of colon (V76 51) (Z12 11)   25  Vitamin D deficiency (268 9) (E55 9)    Past Medical History    1  History of Acute hepatitis c (070 51) (B17 10)   2  History of Allergic Reaction (995 3)   3  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)   4  History of Cellulitis of left leg (682 6) (L03 116)   5  History of Gallbladder disease (575 9) (K82 9)   6  History of Neck strain (847 0) (S16 1XXA)   7  History of Otitis externa, unspecified laterality    Allergies    1  Amoxicillin TABS   2  Dilantin CAPS   3  Erythromycin Base TABS    Current Meds   1  Aleve 220 MG Oral Capsule; Take 1 capsule twice daily; Therapy: 71Gsg3555 to (Evaluate:82Vrr8602); Last Rx:17Pxf5673 Ordered   2  AmLODIPine Besylate 5 MG Oral Tablet; TAKE 1 AND 1/2 TABLETS DAILY; Therapy: 59BTL6723 to (Virginie Velez)  Requested for: 39IKU3398; Last   Rx:11Fqz8636 Ordered   3  Atorvastatin Calcium 20 MG Oral Tablet; Take 1 tablet by mouth daily; Therapy: 17Fsr3192 to (Evaluate:60Ppi2740)  Requested for: 54Rlx0793; Last   Rx:74Ipc4624 Ordered   4  Co Q 10 100 MG Oral Capsule; TAKE 1 CAPSULE TWICE DAILY; Therapy: 92WEN6468 to Recorded   5  Furosemide 20 MG Oral Tablet; TAKE 1 TABLET DAILY; Therapy: 28OUU6614 to (Evaluate:19Apr2017)  Requested for: 21Oct2016; Last   Rx:21Oct2016 Ordered   6   Losartan Potassium 100 MG Oral Tablet; TAKE 1 TABLET DAILY; Therapy: 17NXI3115 to (Wicho Ibarra)  Requested for: 17KWA9474; Last   Rx:2016 Ordered   7  Magnesium Oxide 400 MG Oral Capsule; take 1 capsule daily; Therapy: 60OBJ2234 to (Evaluate:2017); Last Rx:32Nth9286 Ordered   8  [place]-3 Fish Oil 1000 MG Oral Capsule; TAKE 1 CAPSULE DAILY; Therapy: 54QTK8474 to ([phone]) Recorded   9  Potassium Chloride Dahlia ER 20 MEQ Oral Tablet Extended Release; TAKE 1 TABLET   DAILY; Therapy: 37SEI5082 to (Evaluate:2017)  Requested for: 91ZQG0992; Last   Rx:2016 Ordered   10  RisperiDONE 1 MG Oral Tablet; TAKE 1 TABLET EVERY MORNING AND 1 TABLET    EVERY EVENING; Therapy: 14QDM0747 to (Evaluate:2017); Last Rx:2017 Ordered   11  Vitamin D 2000 UNIT Oral Capsule; take 1 capsule daily; Therapy: 49Taj4022 to (Last Rx:82Zmx9249) Ordered    Family Psych History  Mother    1  Family history of Coronary Artery Disease (V17 49)   2  Family history of Mother  At Age 80  Daughter    3  Family history of Family Health Status Children 1  Living Daughters  Brother    4  Family history of Alcoholism   5  Family history of Heart Disease (V17 49)   6  Family history of Prostate Cancer (V16 42)    Social History    · Bereavement (S56 06) (Z63 4)   · Former smoker (V15 82) (D49 963)   · Marital History -  (V61 03)   · Never Drank Alcohol   · Occupation: Retired    End of Encounter Meds    1  Furosemide 20 MG Oral Tablet; TAKE 1 TABLET DAILY; Therapy: 36ARD0569 to (Evaluate:2017)  Requested for: 2016; Last   Rx:2016 Ordered    2  Co Q 10 100 MG Oral Capsule; TAKE 1 CAPSULE TWICE DAILY; Therapy: 85ZVZ9396 to Recorded   3  [place]-3 Fish Oil 1000 MG Oral Capsule; TAKE 1 CAPSULE DAILY; Therapy: 48NJW0713 to (Evaluate:2013) Recorded    4  Atorvastatin Calcium 20 MG Oral Tablet; Take 1 tablet by mouth daily;    Therapy: 08Uxw4108 to (Evaluate:2017)  Requested for: 40Vdz1564; Last Rx: 58VNG2643 Ordered    5  AmLODIPine Besylate 5 MG Oral Tablet; TAKE 1 AND 1/2 TABLETS DAILY; Therapy: 85AMF0393 to (George Danielle)  Requested for: 47ZVF7381; Last   Rx:95Vtc6998 Ordered   6  Losartan Potassium 100 MG Oral Tablet; TAKE 1 TABLET DAILY; Therapy: 33GOO6389 to (Lula Linn)  Requested for: 57WOH9415; Last   Rx:16Mte5721 Ordered    7  Magnesium Oxide 400 MG Oral Capsule; take 1 capsule daily; Therapy: 11VVU9937 to (Evaluate:08Mar2017); Last Rx:67Imk0451 Ordered   8  Potassium Chloride Dahlia ER 20 MEQ Oral Tablet Extended Release; TAKE 1 TABLET   DAILY; Therapy: 59NBV2074 to (Evaluate:19Apr2017)  Requested for: 79JRH9026; Last   Rx:29Ram3443 Ordered    9  RisperiDONE 1 MG Oral Tablet (RisperDAL); TAKE 1 TABLET EVERY MORNING AND 1   TABLET EVERY EVENING; Therapy: 07MCH8228 to (Evaluate:62Jpz8188); Last Rx:88Rlr2078 Ordered    10  Aleve 220 MG Oral Capsule; Take 1 capsule twice daily; Therapy: 17Yur7544 to (Evaluate:74Mig3177); Last Rx:07Qkp1625 Ordered    11  Vitamin D 2000 UNIT Oral Capsule; take 1 capsule daily;     Therapy: 75Gfl5974 to (Last Rx:98Xhk0321) Ordered    Future Appointments    Date/Time Provider Specialty Site   03/15/2017 10:00 AM Awais Montoya MD Internal Medicine [facility] INTERNAL MED     Signatures   Electronically signed by : Kailee Sears, [facility]; Feb 22 2017  4:57PM EST                       (Author)    Electronically signed by : Landon Lopes MD; Feb 27 2017  4:26PM EST

## 2018-01-14 VITALS
OXYGEN SATURATION: 97 % | BODY MASS INDEX: 23.66 KG/M2 | WEIGHT: 147.25 LBS | TEMPERATURE: 98 F | RESPIRATION RATE: 18 BRPM | HEART RATE: 98 BPM | DIASTOLIC BLOOD PRESSURE: 78 MMHG | SYSTOLIC BLOOD PRESSURE: 166 MMHG | HEIGHT: 66 IN

## 2018-01-14 VITALS
OXYGEN SATURATION: 97 % | SYSTOLIC BLOOD PRESSURE: 140 MMHG | BODY MASS INDEX: 24.33 KG/M2 | DIASTOLIC BLOOD PRESSURE: 70 MMHG | RESPIRATION RATE: 18 BRPM | HEART RATE: 88 BPM | WEIGHT: 151.38 LBS | HEIGHT: 66 IN

## 2018-01-15 NOTE — PSYCH
Psych Med Mgmt    Appearance: was calm and cooperative  Observed mood: mood appropriate  Observed mood: affect appropriate  Speech: a normal rate  Thought processes: coherent/organized  Hallucinations: no hallucinations present  Thought Content: no delusions  Abnormal Thoughts: The patient has no suicidal thoughts  Orientation: The patient is oriented to person, place and time  Recent and Remote Memory: short term memory intact and long term memory intact  Attention Span And Concentration: concentration intact  Insight: Limited insight  Judgment: Her judgment was intact  She reports normal appetite, normal energy level, no weight change and normal number of sleep hours  Patient presents for follow up accompanied by her brother  She reports that she has been doing well on her Risperdal  She has not had any auditory hallucinations since her discharge from the hospital in March  She has been compliant with the medication, and tolerating it well  Only side effect is drooling, which she states is tolerable  She is living independently, and there have not been any issues  She does her own cooking and cleaning with occasional help from her brother and friends when they are around  Her sleeping pattern is varied because she takes naps during the day  Assessment    1  Psychosis (298 9) (F29)    Active Problems    1  Abnormal blood chemistry (790 6) (R79 9)   2  Anemia (285 9) (D64 9)   3  Auditory hallucinations (780 1) (R44 0)   4  Bereavement (V62 82) (Z63 4)   5  Constipation (564 00) (K59 00)   6  Edema (782 3) (R60 9)   7  Encounter for breast cancer screening other than mammogram (V76 10) (Z12 39)   8  Encounter for screening mammogram for malignant neoplasm of breast (V76 12)   (Z12 31)   9  Glaucoma (365 9) (H40 9)   10  Hallucinations, visual (368 16) (R44 1)   11  Hypercalcemia (275 42) (E83 52)   12  Hyperlipidemia (272 4) (E78 5)   13  Hypertension (401 9) (I10)   14  Hypokalemia (276 8) (E87 6)   15  Hypomagnesemia (275 2) (E83 42)   16  Impacted cerumen of right ear (380 4) (H61 21)   17  Lower back pain (724 2) (M54 5)   18  Mass of right side of neck (784 2) (R22 1)   19  Need for prophylactic vaccination and inoculation against influenza (V04 81) (Z23)   20  Need for vaccination for pneumococcus (V03 82) (Z23)   21  Osteoporosis (733 00) (M81 0)   22  Psychosis (298 9) (F29)   23  Screening for osteoporosis (V82 81) (Z13 820)   24  Special screening for malignant neoplasm of colon (V76 51) (Z12 11)   25  Vitamin D deficiency (268 9) (E55 9)    Past Medical History    1  History of Acute hepatitis c (070 51) (B17 10)   2  History of Allergic Reaction (995 3)   3  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)   4  History of Cellulitis of left leg (682 6) (L03 116)   5  History of Gallbladder disease (575 9) (K82 9)   6  History of Neck strain (847 0) (S16 1XXA)   7  History of Otitis externa, unspecified laterality    Allergies    1  Amoxicillin TABS   2  Dilantin CAPS   3  Erythromycin Base TABS    Current Meds   1  Alendronate Sodium 70 MG Oral Tablet; take 1 tablet by mouth every week on an empty   stomach with 6-8 oz of water  No food / meds for 30 min  Remain Upright; Therapy: 42TIL2960 to [phone])  Requested for: 58Tjf1684; Last   Rx:08Gge1506 Ordered   2  Aleve 220 MG Oral Capsule; Take 1 capsule twice daily; Therapy: 13Mwo1815 to (Evaluate:17Tks0432); Last Rx:38Oic5732 Ordered   3  AmLODIPine Besylate 5 MG Oral Tablet; TAKE 1 AND 1/2 TABLETS DAILY; Therapy: 10UIV1312 to [phone])  Requested for: 89Cgy7411; Last   Rx:13Ong5500 Ordered   4  Atorvastatin Calcium 20 MG Oral Tablet; Take 1 tablet by mouth daily; Therapy: 60Rxt7315 to (Evaluate:14Oct2017)  Requested for: 30Wed2702; Last   Rx:09Gex9467 Ordered   5  Co Q 10 100 MG Oral Capsule; TAKE 1 CAPSULE TWICE DAILY; Therapy: 94VFJ4255 to Recorded   6   Furosemide 20 MG Oral Tablet; TAKE 1 TABLET DAILY; Therapy: 81BBG7241 to (Evaluate:04Gek4795)  Requested for: 46NAU8276; Last   Rx:2017 Ordered   7  Losartan Potassium 100 MG Oral Tablet; TAKE 1 TABLET DAILY; Therapy: 33PNF7584 to (Terry Brunner)  Requested for: 98TAI3977; Last   Rx:05Meb5273 Ordered   8  Magnesium Oxide 400 MG Oral Capsule; take 1 capsule daily; Therapy: 37IFG4424 to (Evaluate:2017); Last Rx:91Gaq6466 Ordered   9  [place]-3 Fish Oil 1000 MG Oral Capsule; TAKE 1 CAPSULE DAILY; Therapy: 66DTQ1281 to () Recorded   10  Potassium Chloride Dahlia ER 20 MEQ Oral Tablet Extended Release; TAKE 1 TABLET    DAILY; Therapy: 36BYH4829 to (Evaluate:2017)  Requested for: 2016; Last    Rx:2016 Ordered   11  RisperiDONE 1 MG Oral Tablet; TAKE 1 TABLET EVERY MORNING AND 1 TABLET    EVERY EVENING; Therapy: 84DPC8274 to (Evaluate:2017); Last Rx:87Xfq9181 Ordered   12  Vitamin D 2000 UNIT Oral Capsule; take 1 capsule daily; Therapy: 25Zke8680 to (Last Rx:69Yyb5755) Ordered    Family Psych History  Mother    1  Family history of Coronary Artery Disease (V17 49)   2  Family history of Mother  At Age 80  Daughter    3  Family history of Family Health Status Children 1  Living Daughters  Brother    4  Family history of Alcoholism   5  Family history of Heart Disease (V17 49)   6  Family history of Prostate Cancer (V16 42)    Social History    · Bereavement (W44 07) (Z63 4)   · Former smoker (V15 82) (O69 367)   · Marital History -  (V61 03)   · Never Drank Alcohol   · Occupation: Retired    End of Encounter Meds    1  Furosemide 20 MG Oral Tablet; TAKE 1 TABLET DAILY; Therapy: 81JVZ8586 to (Evaluate:48Gcv5891)  Requested for: 81RCL5924; Last   Rx:2017 Ordered    2  Co Q 10 100 MG Oral Capsule; TAKE 1 CAPSULE TWICE DAILY; Therapy: 79BWN0017 to Recorded   3  [place]-3 Fish Oil 1000 MG Oral Capsule; TAKE 1 CAPSULE DAILY;    Therapy: 05OEP9284 to (Evaluate:2013) Recorded    4  Atorvastatin Calcium 20 MG Oral Tablet; Take 1 tablet by mouth daily; Therapy: 26Pcq6900 to (Evaluate:14Oct2017)  Requested for: 18Vrd0008; Last   Rx:90Ltq6348 Ordered    5  AmLODIPine Besylate 5 MG Oral Tablet; TAKE 1 AND 1/2 TABLETS DAILY; Therapy: 46LQM7700 to (21 )  Requested for: 07Ehn1051; Last   Rx:64Xdx9760 Ordered   6  Losartan Potassium 100 MG Oral Tablet; TAKE 1 TABLET DAILY; Therapy: 84GWB5891 to (Ross Coronado)  Requested for: 59UKR1380; Last   Rx:60Rex8789 Ordered    7  Magnesium Oxide 400 MG Oral Capsule; take 1 capsule daily; Therapy: 56SAZ6906 to (Evaluate:08Mar2017); Last Rx:83Vam8017 Ordered   8  Potassium Chloride Dahlia ER 20 MEQ Oral Tablet Extended Release; TAKE 1 TABLET   DAILY; Therapy: 90VJT6389 to (Evaluate:19Apr2017)  Requested for: 12BCJ9933; Last   Rx:05Sgz0474 Ordered    9  Alendronate Sodium 70 MG Oral Tablet; take 1 tablet by mouth every week on an empty   stomach with 6-8 oz of water  No food / meds for 30 min  Remain Upright; Therapy: 54DDL2107 to (21 )  Requested for: 16Qaa5064; Last   Rx:05Qxx8672 Ordered    10  RisperiDONE 1 MG Oral Tablet (RisperDAL); TAKE 1 TABLET EVERY MORNING AND 1    TABLET EVERY EVENING; Therapy: 08BJE4872 to (Evaluate:02Ouw0735); Last Rx:27Uwo0175 Ordered    11  Aleve 220 MG Oral Capsule; Take 1 capsule twice daily; Therapy: 79Hts7515 to (Evaluate:78Drg8739); Last Rx:84Feb8694 Ordered    12  Vitamin D 2000 UNIT Oral Capsule; take 1 capsule daily;     Therapy: 76HXN3724 to (Last Rx:83Xpi9545) Ordered    Future Appointments    Date/Time Provider Specialty Site   06/28/2017 10:00 AM Jaja Montoya MD Internal Medicine [facility] INTERNAL MED     Signatures   Electronically signed by : Gregory Woodruff [facility]; Jun 13 2017  3:09PM EST                       (Author)    Electronically signed by : Mian Melendrez MD; Jun 13 2017  4:30PM EST

## 2018-01-15 NOTE — MISCELLANEOUS
Assessment    1  Hypertension (401 9) (I10)   2  Psychosis (298 9) (F29)    Discussion/Summary  Discussion Summary:     1  Psychosis  No auditory or visual hallucinations, on Risperdal 1 mg bid  F/u with psych tomorrow  2  HTN  Instructed to take her BP medications when she gets home  3  Hyperlipidemia  4  Edema  Take diuretic in AM with K supplement  Regular f/u in 3 months  Counseling Documentation With Imm: The patient was counseled regarding instructions for management, impressions  History of Present Illness  TCM Communication [facility]: The patient is being contacted for follow-up after hospitalization  She was hospitalized at [facility]  The dates of hospitalization: 3/3/17-3/10/17, date of admission: 3/3/17, date of discharge: 3/10/17  She was discharged to home, with her brother Bruce Gay  Medications reviewed and updated today  She scheduled a follow up appointment  Follow-up appointments with other specialists: Dr Yovany Rosario on 3/21/17  The patient is currently asymptomatic  Counseling was provided to the patient  Communication performed and completed by Zachariah Glynn 3/13/17   HPI: Ms Erica Gomez is feeling better  She is taking 1 mg bid since her discharge, prescribed 0 5 in AM  She is still living with her brother, taking her medications regularly  She forgot to take her blood pressure medications this morning  She is sleeping through the night  She denies hearing any voices, does not "see things" anymore  She returned to her apartment today for a change of clothes and says she "realized" what she has done  She thought she was in someone else's apartment and was taking all the clothes out of the closet  She has a good appetite, drinking adequate fluids  Psychotic Disorders (Brief): The patient is being seen for follow-up of a recent hospitalization for a psychotic disorder  The patient is currently asymptomatic  No associated symptoms are reported  Current treatment includes typical antipsychotics  Hypertension (Follow-Up): The patient states she has been stable with her blood pressure control since the last visit  Symptoms: denies dyspnea, denies chest pain and stable lower extremity edema  Associated symptoms include no headache  Home monitoring: The patient is not checking blood pressure at home  Medications: the patient is adherent with her medication regimen  Review of Systems  Complete-Female:   Constitutional: not feeling poorly and not feeling tired  Eyes: no eyesight problems  Cardiovascular: lower extremity edema, but no chest pain  Respiratory: no shortness of breath during exertion  Gastrointestinal: no abdominal pain  Neurological: no headache and no confusion  Psychiatric: no sleep disturbances  no feelings of weakness      Active Problems    1  Abnormal blood chemistry (790 6) (R79 9)   2  Anemia (285 9) (D64 9)   3  Auditory hallucinations (780 1) (R44 0)   4  Bereavement (V62 82) (Z63 4)   5  Constipation (564 00) (K59 00)   6  Edema (782 3) (R60 9)   7  Encounter for breast cancer screening other than mammogram (V76 10) (Z12 39)   8  Encounter for screening mammogram for malignant neoplasm of breast (V76 12)   (Z12 31)   9  Glaucoma (365 9) (H40 9)   10  Hallucinations, visual (368 16) (R44 1)   11  Hypercalcemia (275 42) (E83 52)   12  Hyperlipidemia (272 4) (E78 5)   13  Hypertension (401 9) (I10)   14  Hypokalemia (276 8) (E87 6)   15  Hypomagnesemia (275 2) (E83 42)   16  Impacted cerumen of right ear (380 4) (H61 21)   17  Lower back pain (724 2) (M54 5)   18  Mass of right side of neck (784 2) (R22 1)   19  Need for prophylactic vaccination and inoculation against influenza (V04 81) (Z23)   20  Need for vaccination for pneumococcus (V03 82) (Z23)   21  Osteoporosis (733 00) (M81 0)   22  Psychosis (298 9) (F29)   23  Screening for osteoporosis (V82 81) (Z13 820)   24  Special screening for malignant neoplasm of colon (V76 51) (Z12 11)   25   Vitamin D deficiency (268 9) (E55 9)    Past Medical History    1  History of Acute hepatitis c (070 51) (B17 10)   2  History of Allergic Reaction (995 3)   3  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)   4  History of Cellulitis of left leg (682 6) (L03 116)   5  History of Gallbladder disease (575 9) (K82 9)   6  History of Neck strain (847 0) (S16 1XXA)   7  History of Otitis externa, unspecified laterality    Surgical History    1  History of Aneurysm Repair Intracran Complex Verteb-Basilar Circulation   2  History of Appendectomy   3  History of Cholecystectomy   4  History of Total Abdominal Hysterectomy    Family History  Mother    1  Family history of Coronary Artery Disease (V17 49)   2  Family history of Mother  At Age 80  Daughter    3  Family history of Family Health Status Children 1  Living Daughters  Brother    4  Family history of Alcoholism   5  Family history of Heart Disease (V17 49)   6  Family history of Prostate Cancer (V16 42)    Social History    · Bereavement (P06 25) (Z63 4)   · Former smoker (U06 33) (Y37 554)   · Marital History -  (V61 03)   · Never Drank Alcohol   · Occupation: Retired  Social History Reviewed: The social history was reviewed and is unchanged  Current Meds   1  Aleve 220 MG Oral Capsule; Take 1 capsule twice daily; Therapy: 70Bfa0631 to (Evaluate:58Qdq6904); Last Rx:59Fmc4449 Ordered   2  AmLODIPine Besylate 5 MG Oral Tablet; TAKE 1 AND 1/2 TABLETS DAILY; Therapy: 86DEN2505 to [phone])  Requested for: 37RPZ1190; Last   Rx:65Cpc0448 Ordered   3  Atorvastatin Calcium 20 MG Oral Tablet; Take 1 tablet by mouth daily; Therapy: 54Apd9673 to (Evaluate:70Ijt5562)  Requested for: 43Yyi5821; Last   Rx:13Mba6318 Ordered   4  Co Q 10 100 MG Oral Capsule; TAKE 1 CAPSULE TWICE DAILY; Therapy: 11BZH2704 to Recorded   5  Furosemide 20 MG Oral Tablet; TAKE 1 TABLET DAILY;    Therapy: 14SUP0268 to (Evaluate:2017)  Requested for: 59SFN2089; Last Rx: 21Oct2016 Ordered   6  Losartan Potassium 100 MG Oral Tablet; TAKE 1 TABLET DAILY; Therapy: 84XPJ7670 to (Kiet Ro)  Requested for: 29QDZ7065; Last   Rx:03Nov2016 Ordered   7  Magnesium Oxide 400 MG Oral Capsule; take 1 capsule daily; Therapy: 70ROI4299 to (Evaluate:08Mar2017); Last Rx:40Twd4911 Ordered   8  Wray-3 Fish Oil 1000 MG Oral Capsule; TAKE 1 CAPSULE DAILY; Therapy: 75NAN1589 to ([phone]) Recorded   9  Potassium Chloride Dahlia ER 20 MEQ Oral Tablet Extended Release; TAKE 1 TABLET   DAILY; Therapy: 67KMK1071 to (Evaluate:19Apr2017)  Requested for: 28NWA0353; Last   Rx:21Oct2016 Ordered   10  RisperiDONE 1 MG Oral Tablet; TAKE 1 TABLET EVERY MORNING AND 1 TABLET    EVERY EVENING; Therapy: 61AYP5711 to (Evaluate:08May2017); Last Rx:87Oyp9404 Ordered   11  Vitamin D 2000 UNIT Oral Capsule; take 1 capsule daily; Therapy: 16YVF8757 to (Last Rx:04Yqb1068) Ordered  Medication List Reviewed: The medication list was reviewed and updated today  Allergies    1  Amoxicillin TABS   2  Dilantin CAPS   3  Erythromycin Base TABS    Vitals  Signs   Recorded: 20Mar2017 12:56PM   Heart Rate: 70  Respiration: 18  Systolic: 489  Diastolic: 78  Height: 5 ft 5 5 in  Weight: 152 lb 4 00 oz  BMI Calculated: 24 95  BSA Calculated: 1 76  O2 Saturation: 98    Physical Exam    Constitutional   General appearance: No acute distress, well appearing and well nourished  comfortable and clothing appropriate  Head and Face   Head and face: Normal     Eyes   Pupils and irises: Equal, round, reactive to light  Pulmonary   Respiratory effort: No increased work of breathing or signs of respiratory distress  Auscultation of lungs: Clear to auscultation  Cardiovascular   Auscultation of heart: Normal rate and rhythm, normal S1 and S2, no murmurs  Examination of extremities for edema and/or varicosities: Abnormal   bilateral pretibial pitting edema     Abdomen   Abdomen: Non-tender, no masses  Musculoskeletal   Gait and station: Normal     Neurologic   Cortical function: Normal mental status  Psychiatric   Mood and affect: Normal        Health Management  Encounter for breast cancer screening other than mammogram   Digital Bilateral Screening Mammogram With CAD; every 1 year; Last 97EMU3546; Next Due:  70CFA4671; Active  Health Maintenance   Medicare Annual Wellness Visit; every 1 year; Next Due: 16VOP7184;  Overdue    Future Appointments    Date/Time Provider Specialty Site   03/21/2017 03:30 PM Mary Avila [facility]     Signatures   Electronically signed by : Kael Perkins MD; Mar 20 2017  1:22PM EST                       (Author)

## 2018-01-15 NOTE — RESULT NOTES
Message   some of blood test results are back  liver, kidney, electrolyte, thyroid function & blood count results look fine  urine test results look fine but Jennie appears mildly dehydrated  other lab test results are in process still  Recomend to continue with current treatment plan & see psychiatrist   If symptoms worsen go to Emergency Room     Verified Results  (1) CBC/PLT/DIFF 04UPA9150 02:15PM Melida Marquez    Order Number: [id number]     Test Name Result Flag Reference   WBC COUNT 4 66 Thousand/uL  4 31-10 16   RBC COUNT 4 87 Million/uL  3 81-5 12   HEMOGLOBIN 13 5 g/dL  11 5-15 4   HEMATOCRIT 42 2 %  34 8-46  1   MCV 87 fL  82-98   MCH 27 7 pg  26 8-34 3   MCHC 32 0 g/dL  31 4-37 4   RDW 13 9 %  11 6-15 1   MPV 11 0 fL  8 9-12 7   PLATELET COUNT 646 Thousands/uL  149-390   NEUTROPHILS RELATIVE PERCENT 50 %  43-75   LYMPHOCYTES RELATIVE PERCENT 34 %  14-44   MONOCYTES RELATIVE PERCENT 16 % H 4-12   EOSINOPHILS RELATIVE PERCENT 0 %  0-6   BASOPHILS RELATIVE PERCENT 0 %  0-1   NEUTROPHILS ABSOLUTE COUNT 2 30 Thousands/?L  1 85-7 62   LYMPHOCYTES ABSOLUTE COUNT 1 58 Thousands/?L  0 60-4 47   MONOCYTES ABSOLUTE COUNT 0 76 Thousand/?L  0 17-1 22   EOSINOPHILS ABSOLUTE COUNT 0 00 Thousand/?L  0 00-0 61   BASOPHILS ABSOLUTE COUNT 0 02 Thousands/?L  0 00-0 10   - Patient Instructions: This bloodwork is non-fasting  Please drink two glasses of water morning of bloodwork  - Patient Instructions: This bloodwork is non-fasting  Please drink two glasses of water morning of bloodwork

## 2018-01-16 NOTE — RESULT NOTES
Verified Results  (1) COMPREHENSIVE METABOLIC PANEL 82EAM6005 87:46EZ GFG Groupene Hands   TW Order Number: [id number]  TW Order Number: [id number]     Test Name Result Flag Reference   GLUCOSE,RANDM 93 mg/dL     If the patient is fasting, the ADA then defines impaired fasting glucose as > 100 mg/dL and diabetes as > or equal to 123 mg/dL  SODIUM 139 mmol/L  136-145   POTASSIUM 3 4 mmol/L L 3 5-5 3   CHLORIDE 103 mmol/L  100-108   CARBON DIOXIDE 27 mmol/L  21-32   ANION GAP (CALC) 9 mmol/L  4-13   BLOOD UREA NITROGEN 12 mg/dL  5-25   CREATININE 0 98 mg/dL  0 60-1 30   Standardized to IDMS reference method   CALCIUM 10 5 mg/dL H 8 3-10 1   BILI, TOTAL 1 00 mg/dL  0 20-1 00   ALK PHOSPHATAS 70 U/L     ALT (SGPT) 22 U/L  12-78   AST(SGOT) 23 U/L  5-45   ALBUMIN 4 0 g/dL  3 5-5 0   TOTAL PROTEIN 7 8 g/dL  6 4-8 2   eGFR Non-African American      >60 0 ml/min/1 73sq m   - Patient Instructions: This is a fasting blood test  Water,black tea or black  coffee only after 9:00pm the night before test Drink 2 glasses of water the morning of test - Patient Instructions: This bloodwork is non-fasting  Please drink two glasses of   water morning of bloodwork  National Kidney Disease Education Program recommendations are as follows:  GFR calculation is accurate only with a steady state creatinine  Chronic Kidney disease less than 60 ml/min/1 73 sq  meters  Kidney failure less than 15 ml/min/1 73 sq  meters       (1) PTH N-TERMINAL (INTACT) 17UHB2632 10:43AM Vicene Hands   TW Order Number: [id number]  TW Order Number: [id number]     Test Name Result Flag Reference   PARATHYROID HORMONE INTACT 56 3 pg/mL  14 0-72 0     (1) VITAMIN D 25-HYDROXY 27SCF2971 10:43AM Irineo Neri Order Number: [id number]  TW Order Number: [id number]     Test Name Result Flag Reference   VIT D 25-HYDROX 25 1 ng/mL L 30 0-100 0   This assay is a certified procedure of the CDC Vitamin D Standardization Certification Program (VDSCP)     Deficiency <20ng/ml   Insufficiency 20-30ng/ml   Sufficient  ng/ml     *Patients undergoing fluorescein dye angiography may retain small amounts of fluorescein in the body for 48-72 hours post procedure  Samples containing fluorescein can produce falsely elevated Vitamin D values  If the patient had this procedure, a specimen should be resubmitted post fluorescein clearance  (1) LIPID PANEL, FASTING 60KCJ7695 10:43AM Gilles Long    Order Number: [id number]   Order Number: [id number]     Test Name Result Flag Reference   CHOLESTEROL 145 mg/dL     HDL,DIRECT 61 mg/dL H 40-60   Specimen collection should occur prior to Metamizole administration due to the potential for falsely depressed results  LDL CHOLESTEROL CALCULATED 69 mg/dL  0-100   - Patient Instructions: This is a fasting blood test  Water,black tea or black  coffee only after 9:00pm the night before test   Drink 2 glasses of water the morning of test     - Patient Instructions: This is a fasting blood test  Water,black tea or black  coffee only after 9:00pm the night before test Drink 2 glasses of water the morning of test - Patient Instructions: This bloodwork is non-fasting  Please drink two glasses of   water morning of bloodwork  Triglyceride:         Normal              <150 mg/dl       Borderline High    150-199 mg/dl       High               200-499 mg/dl       Very High          >499 mg/dl  Cholesterol:         Desirable        <200 mg/dl      Borderline High  200-239 mg/dl      High             >239 mg/dl  HDL Cholesterol:        High    >59 mg/dL      Low     <41 mg/dL  LDL CALCULATED:    This screening LDL is a calculated result  It does not have the accuracy of the Direct Measured LDL in the monitoring of patients with hyperlipidemia and/or statin therapy     Direct Measure LDL (SHQ875) must be ordered separately in these patients  TRIGLYCERIDES 73 mg/dL  <=150   Specimen collection should occur prior to N-Acetylcysteine or Metamizole administration due to the potential for falsely depressed results  (1) CBC/PLT/DIFF 00KYN6800 10:43AM Nesha Dahl   TW Order Number: [id number]  TW Order Number: [id number]     Test Name Result Flag Reference   WBC COUNT 5 34 Thousand/uL  4 31-10 16   RBC COUNT 4 44 Million/uL  3 81-5 12   HEMOGLOBIN 12 4 g/dL  11 5-15 4   HEMATOCRIT 37 5 %  34 8-46  1   MCV 85 fL  82-98   MCH 27 9 pg  26 8-34 3   MCHC 33 1 g/dL  31 4-37 4   RDW 13 6 %  11 6-15 1   MPV 10 4 fL  8 9-12 7   PLATELET COUNT 373 Thousands/uL  149-390   NEUTROPHILS RELATIVE PERCENT 51 %  43-75   LYMPHOCYTES RELATIVE PERCENT 31 %  14-44   MONOCYTES RELATIVE PERCENT 18 % H 4-12   EOSINOPHILS RELATIVE PERCENT 0 %  0-6   BASOPHILS RELATIVE PERCENT 0 %  0-1   NEUTROPHILS ABSOLUTE COUNT 2 72 Thousands/?L  1 85-7 62   LYMPHOCYTES ABSOLUTE COUNT 1 66 Thousands/?L  0 60-4 47   MONOCYTES ABSOLUTE COUNT 0 94 Thousand/?L  0 17-1 22   EOSINOPHILS ABSOLUTE COUNT 0 00 Thousand/?L  0 00-0 61   BASOPHILS ABSOLUTE COUNT 0 02 Thousands/?L  0 00-0 10   - Patient Instructions: This bloodwork is non-fasting  Please drink two glasses of water morning of bloodwork  - Patient Instructions: This bloodwork is non-fasting  Please drink two glasses of water morning of bloodwork  (1) MAGNESIUM 04OWT3147 10:43AM Nesha Dahl   TW Order Number: [id number]  TW Order Number: [id number]     Test Name Result Flag Reference   MAGNESIUM 1 3 mg/dL L 1 6-2 6   - Patient Instructions: This is a fasting blood test  Water,black tea or black  coffee only after 9:00pm the night before test Drink 2 glasses of water the morning of test - Patient Instructions: This bloodwork is non-fasting  Please drink two glasses of   water morning of bloodwork       (1) TSH 31OQQ8697 10:43AM Nesha Dahl   TW Order Number: [id number]   Order Number: [id number]     Test Name Result Flag Reference   TSH 2 578 uIU/mL  0 358-3 740   - Patient Instructions: This bloodwork is non-fasting  Please drink two glasses of water morning of bloodwork  - Patient Instructions: This is a fasting blood test  Water,black tea or black  coffee only after 9:00pm the night before test Drink 2 glasses of water the morning of test - Patient Instructions: This bloodwork is non-fasting  Please drink two glasses of   water morning of bloodwork  Patients undergoing fluorescein dye angiography may retain small amounts of fluorescein in the body for 48-72 hours post procedure  Samples containing fluorescein can produce falsely depressed TSH values  If the patient had this procedure,a specimen should be resubmitted post fluorescein clearance            The recommended reference ranges for TSH during pregnancy are as follows:  First trimester 0 1 to 2 5 uIU/mL  Second trimester  0 2 to 3 0 uIU/mL  Third trimester 0 3 to 3 0 uIU/m

## 2018-01-17 NOTE — RESULT NOTES
Verified Results  (1) COMPREHENSIVE METABOLIC PANEL 31EKO3197 01:89BK Lelan Stage Order Number: [id number]     Test Name Result Flag Reference   GLUCOSE,RANDM 88 mg/dL     If the patient is fasting, the ADA then defines impaired fasting glucose as > 100 mg/dL and diabetes as > or equal to 123 mg/dL  SODIUM 144 mmol/L  136-145   POTASSIUM 3 5 mmol/L  3 5-5 3   CHLORIDE 107 mmol/L  100-108   CARBON DIOXIDE 30 mmol/L  21-32   ANION GAP (CALC) 7 mmol/L  4-13   BLOOD UREA NITROGEN 13 mg/dL  5-25   CREATININE 0 84 mg/dL  0 60-1 30   Standardized to IDMS reference method   CALCIUM 10 1 mg/dL  8 3-10 1   BILI, TOTAL 0 40 mg/dL  0 20-1 00   ALK PHOSPHATAS 91 U/L     ALT (SGPT) 19 U/L  12-78   AST(SGOT) 20 U/L  5-45   ALBUMIN 3 8 g/dL  3 5-5 0   TOTAL PROTEIN 7 7 g/dL  6 4-8 2   eGFR Non-African American      >60 0 ml/min/1 73sq m   [facility] Energy Disease Education Program recommendations are as follows:  GFR calculation is accurate only with a steady state creatinine  Chronic Kidney disease less than 60 ml/min/1 73 sq  meters  Kidney failure less than 15 ml/min/1 73 sq  meters  (1) TSH WITH FT4 REFLEX 15Uqn2568 02:15PM Lelan Stage Order Number: [id number]     Test Name Result Flag Reference   TSH 1 716 uIU/mL  0 358-3 740   Patients undergoing fluorescein dye angiography may retain small amounts of fluorescein in the body for 48-72 hours post procedure  Samples containing fluorescein can produce falsely depressed TSH values  If the patient had this procedure,a specimen should be resubmitted post fluorescein clearance            The recommended reference ranges for TSH during pregnancy are as follows:  First trimester 0 1 to 2 5 uIU/mL  Second trimester  0 2 to 3 0 uIU/mL  Third trimester 0 3 to 3 0 uIU/m

## 2018-01-22 VITALS
BODY MASS INDEX: 21.62 KG/M2 | RESPIRATION RATE: 18 BRPM | HEART RATE: 52 BPM | WEIGHT: 134.5 LBS | DIASTOLIC BLOOD PRESSURE: 78 MMHG | TEMPERATURE: 97.9 F | SYSTOLIC BLOOD PRESSURE: 188 MMHG | HEIGHT: 66 IN | OXYGEN SATURATION: 95 %

## 2018-01-22 VITALS
WEIGHT: 152.25 LBS | HEART RATE: 70 BPM | OXYGEN SATURATION: 98 % | DIASTOLIC BLOOD PRESSURE: 78 MMHG | HEIGHT: 66 IN | SYSTOLIC BLOOD PRESSURE: 148 MMHG | BODY MASS INDEX: 24.47 KG/M2 | RESPIRATION RATE: 18 BRPM

## 2018-01-22 VITALS — SYSTOLIC BLOOD PRESSURE: 156 MMHG | DIASTOLIC BLOOD PRESSURE: 78 MMHG

## 2018-01-23 NOTE — MISCELLANEOUS
Discussion/Summary  Discussion Summary:   Daughter cancelled appt  wound care per [facility]  History of Present Illness  TCM Communication [facility]: The patient is being contacted for follow-up after hospitalization and 12/22/17  She was hospitalized [facility]  The date of admission: 12/5/17, date of discharge: 12/18/17  Diagnosis: Acute Encephalopathy  She was discharged to home  Medications were not reviewed today  She scheduled a follow up appointment  Counseling was provided to patient's caretaker  Fabiana Bustillo  Communication performed and completed by CS/AE 12/18/17      Active Problems    1  Abnormal blood chemistry (790 6) (R79 9)   2  Anemia (285 9) (D64 9)   3  Auditory hallucinations (780 1) (R44 0)   4  Bereavement (V62 82) (Z63 4)   5  Constipation (564 00) (K59 00)   6  Edema (782 3) (R60 9)   7  Encounter for breast cancer screening other than mammogram (V76 10) (Z12 39)   8  Encounter for screening mammogram for malignant neoplasm of breast (V76 12)   (Z12 31)   9  Glaucoma (365 9) (H40 9)   10  Hallucinations, visual (368 16) (R44 1)   11  Hypercalcemia (275 42) (E83 52)   12  Hyperlipidemia (272 4) (E78 5)   13  Hypertension (401 9) (I10)   14  Hypokalemia (276 8) (E87 6)   15  Hypomagnesemia (275 2) (E83 42)   16  Impacted cerumen of right ear (380 4) (H61 21)   17  Need for prophylactic vaccination and inoculation against influenza (V04 81) (Z23)   18  Need for vaccination for pneumococcus (V03 82) (Z23)   19  Osteoporosis (733 00) (M81 0)   20  Psychosis (298 9) (F29)   21  Screening for osteoporosis (V82 81) (Z13 820)   22  Special screening for malignant neoplasm of colon (V76 51) (Z12 11)   23  Vitamin B12 deficiency (266 2) (E53 8)   24  Vitamin D deficiency (268 9) (E55 9)    Past Medical History    1  History of Acute hepatitis c (070 51) (B17 10)   2  History of Allergic Reaction (995 3)   3  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)   4   History of Cellulitis of left leg (682 6) (L03 116)   5  History of Gallbladder disease (575 9) (K82 9)   6  History of low back pain (V13 59) (Z87 39)   7  History of Mass of right side of neck (784 2) (R22 1)   8  History of Neck strain (847 0) (S16 1XXA)   9  History of Otitis externa, unspecified laterality    Surgical History    1  History of Aneurysm Repair Intracran Complex Verteb-Basilar Circulation   2  History of Appendectomy   3  History of Cholecystectomy   4  History of Total Abdominal Hysterectomy    Family History  Mother    1  Denied: Family history of Alcoholism and drug addiction in family   2  Denied: Family history of Anxiety and depression   3  Family history of Coronary Artery Disease (V17 49)   4  Family history of Mother  At Age 80  Father    11  Denied: Family history of Alcoholism and drug addiction in family   6  Denied: Family history of Anxiety and depression  Child    7  Denied: Family history of Alcoholism and drug addiction in family   6  Denied: Family history of Anxiety and depression  Daughter    5  Family history of Family Health Status Children 1  Living Daughters  Sibling    10  Denied: Family history of Anxiety and depression  Brother    6  Family history of Alcoholism   12  Family history of Heart Disease (V17 49)   13  Family history of Prostate Cancer (V16 42)    Social History    · Bereavement (I96 41) (Z63 4)   · Former smoker (V15 82) (R07 102)   · Marital History -  (V61 03)   · Never Drank Alcohol   · Occupation: Retired    Current Meds   1  Alendronate Sodium 70 MG Oral Tablet; take 1 tablet by mouth every week on an empty   stomach with 6-8 oz of water  No food / meds for 30 min  Remain Upright; Therapy: 75KMS3945 to ( 64 30 83)  Requested for: 2017; Last   Rx:2017 Ordered   2  Aleve 220 MG Oral Capsule; Take 1 capsule twice daily; Therapy: 66Onp8157 to (Evaluate:23Wrk8898); Last Rx:55Luz0434 Ordered   3  Atorvastatin Calcium 20 MG Oral Tablet;  Take 1 tablet by mouth daily; Therapy: 74Zbb7963 to (Evaluate:14Oct2017)  Requested for: 95Eem1540; Last   Rx:05Yuu8577 Ordered   4  Co Q 10 100 MG Oral Capsule; TAKE 1 CAPSULE TWICE DAILY; Therapy: 70EMH9273 to Recorded   5  CVS Vitamin B-12 1000 MCG Oral Tablet; take 1 tablet by mouth every day; Therapy: 49KZO6795 to (Evaluate:11Yxx1903); Last Rx:28Jun2017 Ordered   6  Furosemide 20 MG Oral Tablet; TAKE 1 TABLET DAILY; Therapy: 12PVS2794 to (Evaluate:05Vwm1656)  Requested for: 12DGQ3964; Last   Rx:17May2017 Ordered   7  Losartan Potassium 100 MG Oral Tablet; TAKE 1 TABLET DAILY; Therapy: 86ULS4708 to (Evaluate:27Hvo5359)  Requested for: 88NQA0605; Last   Rx:28Jun2017 Ordered   8  Magnesium Oxide 400 MG Oral Capsule; take 1 capsule daily; Therapy: 89SHO6285 to (Evaluate:08Mar2017); Last Rx:78Uak0632 Ordered   9  Metoprolol Tartrate 25 MG Oral Tablet; TAKE 1 TABLET TWICE DAILY; Therapy: 05OJJ4167 to (Evaluate:56Xlh6013)  Requested for: 34HMX4475; Last   Rx:18Oct2017 Ordered   10  Stevenson-3 Fish Oil 1000 MG Oral Capsule; TAKE 1 CAPSULE DAILY; Therapy: 57ENE4103 to (0477 11 28 98) Recorded   11  Potassium Chloride Dahlia ER 20 MEQ Oral Tablet Extended Release; TAKE 1 TABLET    DAILY WITH FUROSEMIDE; Therapy: 73FRW8582 to (Evaluate:08Mrx5730)  Requested for: 24NPW2517; Last    Rx:28Jun2017 Ordered   12  Potassium Chloride ER 10 MEQ Oral Capsule Extended Release; take 1 capsule daily; Therapy: 61RTE5493 to (Evaluate:16Alv3206)  Requested for: 12GID9355; Last    Rx:13Nov2017 Ordered   13  RisperiDONE 1 MG Oral Tablet; TAKE 1 TABLET EVERY MORNING AND 1 TABLET    EVERY EVENING; Therapy: 72GIQ9241 to (Evaluate:22Zix7610)  Requested for: 39LIH8808; Last    Rx:28Jun2017 Ordered   14  Vitamin B-12 1000 MCG Oral Tablet; TAKE 1 TABLET BY MOUTH EVERY DAY; Therapy: 69Fpd2851 to (Last Rx:11Sep2017) Ordered   15  Vitamin D 2000 UNIT Oral Capsule; take 1 capsule daily;     Therapy: 09Eul8402 to (Last Rx: 68BSW0620) Ordered    Allergies    1  Amoxicillin TABS   2  Dilantin CAPS   3  Erythromycin Base TABS    Health Management  Encounter for breast cancer screening other than mammogram   Digital Bilateral Screening Mammogram With CAD; every 1 year; Last 12BKH0493; Next Due:  77ARE5746; Overdue  Health Maintenance   Medicare Annual Wellness Visit; every 1 year; Next Due: 88UCC7768; Overdue    Future Appointments    Date/Time Provider Specialty Site   01/04/2018 01:40 PM MARISABEL Burkett   Endocrinology [facility] ENDOCRINOLOGY [facility]   01/09/2018 12:30 PM Angi Montoya MD Internal Medicine [address] [place] [address] INTERNAL MED   02/19/2018 01:30 PM Angi Montoya MD Internal Medicine [address] [place] [address] INTERNAL [facility]     Signatures   Electronically signed by : Mendel Chase, MD; Dec 22 2017  5:51PM EST                       (Author)

## 2018-01-31 ENCOUNTER — TELEPHONE (OUTPATIENT)
Dept: INTERNAL MEDICINE CLINIC | Facility: CLINIC | Age: 83
End: 2018-01-31

## 2018-01-31 NOTE — TELEPHONE ENCOUNTER
THEY ARE CHANGING  THE WOUND CARE ROUTINE  SANTYL OINTMENT-IS BEING D/C'D  THEY ARE GOING TO USE CALCIUM ALGINATE W/ SILVER DAILY AND COVER W/ A DRY DRESSING  SHE WILL SEND YOU ORDERS TO SIGN

## 2018-02-06 ENCOUNTER — TELEPHONE (OUTPATIENT)
Dept: INTERNAL MEDICINE CLINIC | Facility: CLINIC | Age: 83
End: 2018-02-06

## 2018-02-06 NOTE — TELEPHONE ENCOUNTER
PAPITO FROM A CALLED  THEY WOULD LIKE TO BRING A  IN TO HELP THE FAMILY  THEY NEED A VERBAL OKAY FOR THIS     [address] [place] [address]

## 2018-02-10 PROBLEM — E53.8 VITAMIN B12 DEFICIENCY: Status: ACTIVE | Noted: 2017-06-28

## 2018-02-10 PROBLEM — R44.0 AUDITORY HALLUCINATIONS: Status: ACTIVE | Noted: 2017-02-02

## 2018-02-10 PROBLEM — R44.1 HALLUCINATIONS, VISUAL: Status: ACTIVE | Noted: 2017-02-02

## 2018-02-13 ENCOUNTER — OFFICE VISIT (OUTPATIENT)
Dept: INTERNAL MEDICINE CLINIC | Facility: CLINIC | Age: 83
End: 2018-02-13
Payer: COMMERCIAL

## 2018-02-13 VITALS
OXYGEN SATURATION: 97 % | WEIGHT: 118 LBS | HEIGHT: 66 IN | RESPIRATION RATE: 18 BRPM | HEART RATE: 88 BPM | DIASTOLIC BLOOD PRESSURE: 78 MMHG | SYSTOLIC BLOOD PRESSURE: 136 MMHG | TEMPERATURE: 97.9 F | BODY MASS INDEX: 18.96 KG/M2

## 2018-02-13 DIAGNOSIS — I10 HTN (HYPERTENSION), BENIGN: ICD-10-CM

## 2018-02-13 DIAGNOSIS — E83.52 HYPERCALCEMIA: Primary | ICD-10-CM

## 2018-02-13 DIAGNOSIS — L98.422 SKIN ULCER OF SACRUM WITH FAT LAYER EXPOSED (HCC): ICD-10-CM

## 2018-02-13 DIAGNOSIS — F02.80 LATE ONSET ALZHEIMER'S DISEASE WITHOUT BEHAVIORAL DISTURBANCE (HCC): ICD-10-CM

## 2018-02-13 DIAGNOSIS — G30.1 LATE ONSET ALZHEIMER'S DISEASE WITHOUT BEHAVIORAL DISTURBANCE (HCC): ICD-10-CM

## 2018-02-13 PROBLEM — R53.81 PHYSICAL DECONDITIONING: Status: ACTIVE | Noted: 2017-12-20

## 2018-02-13 PROCEDURE — 99214 OFFICE O/P EST MOD 30 MIN: CPT | Performed by: INTERNAL MEDICINE

## 2018-02-13 RX ORDER — POTASSIUM CHLORIDE 750 MG/1
1 CAPSULE, EXTENDED RELEASE ORAL DAILY
COMMUNITY
Start: 2017-11-13 | End: 2018-04-11 | Stop reason: HOSPADM

## 2018-02-13 RX ORDER — CHOLECALCIFEROL (VITAMIN D3) 25 MCG
CAPSULE ORAL
Refills: 0 | COMMUNITY
Start: 2017-12-18 | End: 2018-04-11 | Stop reason: HOSPADM

## 2018-02-13 RX ORDER — CALCIUM CARBONATE/VITAMIN D3 500-10/5ML
1 LIQUID (ML) ORAL DAILY
COMMUNITY
Start: 2016-09-09 | End: 2021-12-26 | Stop reason: HOSPADM

## 2018-02-13 RX ORDER — POTASSIUM CHLORIDE 20 MEQ/1
TABLET, EXTENDED RELEASE ORAL
COMMUNITY
Start: 2012-12-04 | End: 2018-04-11 | Stop reason: HOSPADM

## 2018-02-13 RX ORDER — COLLAGENASE SANTYL 250 [ARB'U]/G
OINTMENT TOPICAL
Refills: 0 | COMMUNITY
Start: 2017-12-28 | End: 2018-04-11 | Stop reason: HOSPADM

## 2018-02-13 RX ORDER — RISPERIDONE 1 MG/1
1 TABLET, FILM COATED ORAL 2 TIMES DAILY
COMMUNITY
Start: 2017-02-07 | End: 2018-02-13 | Stop reason: ALTCHOICE

## 2018-02-13 RX ORDER — COVID-19 ANTIGEN TEST
1 KIT MISCELLANEOUS 2 TIMES DAILY
COMMUNITY
Start: 2014-09-03 | End: 2021-12-26 | Stop reason: HOSPADM

## 2018-02-13 RX ORDER — TRAMADOL HYDROCHLORIDE 50 MG/1
TABLET ORAL
COMMUNITY
Start: 2017-12-18 | End: 2021-12-26 | Stop reason: HOSPADM

## 2018-02-13 RX ORDER — LANOLIN ALCOHOL/MO/W.PET/CERES
1 CREAM (GRAM) TOPICAL DAILY
COMMUNITY
Start: 2017-09-11 | End: 2018-04-11 | Stop reason: HOSPADM

## 2018-02-13 RX ORDER — CHLORAL HYDRATE 500 MG
1 CAPSULE ORAL DAILY
COMMUNITY
Start: 2012-12-28 | End: 2021-12-26 | Stop reason: HOSPADM

## 2018-02-13 RX ORDER — DOCUSATE SODIUM 100 MG/1
CAPSULE, LIQUID FILLED ORAL
COMMUNITY
Start: 2018-01-09 | End: 2018-04-11 | Stop reason: HOSPADM

## 2018-02-13 RX ORDER — QUETIAPINE FUMARATE 25 MG/1
25 TABLET, FILM COATED ORAL
Qty: 90 TABLET | Refills: 0 | Status: SHIPPED | OUTPATIENT
Start: 2018-02-13 | End: 2021-12-26 | Stop reason: HOSPADM

## 2018-02-13 RX ORDER — FUROSEMIDE 20 MG/1
1 TABLET ORAL DAILY
COMMUNITY
Start: 2012-12-30 | End: 2018-04-11 | Stop reason: HOSPADM

## 2018-02-13 RX ORDER — MULTIVIT-MIN/IRON/FOLIC ACID/K 18-600-40
1 CAPSULE ORAL DAILY
COMMUNITY
Start: 2018-01-09

## 2018-02-13 RX ORDER — ALENDRONATE SODIUM 70 MG/1
TABLET ORAL
COMMUNITY
Start: 2016-10-21

## 2018-02-13 RX ORDER — LOSARTAN POTASSIUM 100 MG/1
1 TABLET ORAL DAILY
COMMUNITY
Start: 2012-12-30 | End: 2018-04-11 | Stop reason: HOSPADM

## 2018-02-13 RX ORDER — ATORVASTATIN CALCIUM 20 MG/1
1 TABLET, FILM COATED ORAL
COMMUNITY
Start: 2012-08-28 | End: 2018-03-22 | Stop reason: SDUPTHER

## 2018-02-14 ENCOUNTER — LAB REQUISITION (OUTPATIENT)
Dept: LAB | Facility: HOSPITAL | Age: 83
End: 2018-02-14
Payer: COMMERCIAL

## 2018-02-14 ENCOUNTER — TELEPHONE (OUTPATIENT)
Dept: INTERNAL MEDICINE CLINIC | Facility: CLINIC | Age: 83
End: 2018-02-14

## 2018-02-14 DIAGNOSIS — I10 ESSENTIAL (PRIMARY) HYPERTENSION: ICD-10-CM

## 2018-02-14 DIAGNOSIS — G30.1 ALZHEIMER'S DISEASE WITH LATE ONSET (CODE) (HCC): ICD-10-CM

## 2018-02-14 DIAGNOSIS — L98.422 NON-PRESSURE CHRONIC ULCER OF BACK WITH FAT LAYER EXPOSED (HCC): ICD-10-CM

## 2018-02-14 DIAGNOSIS — E83.52 HYPERCALCEMIA: ICD-10-CM

## 2018-02-14 DIAGNOSIS — F02.80 DEMENTIA IN OTHER DISEASES CLASSIFIED ELSEWHERE WITHOUT BEHAVIORAL DISTURBANCE (HCC): ICD-10-CM

## 2018-02-14 LAB
ALBUMIN SERPL BCP-MCNC: 2.7 G/DL (ref 3.5–5)
ALP SERPL-CCNC: 71 U/L (ref 46–116)
ALT SERPL W P-5'-P-CCNC: 12 U/L (ref 12–78)
ANION GAP SERPL CALCULATED.3IONS-SCNC: 5 MMOL/L (ref 4–13)
AST SERPL W P-5'-P-CCNC: 13 U/L (ref 5–45)
BASOPHILS # BLD AUTO: 0.02 THOUSANDS/ΜL (ref 0–0.1)
BASOPHILS NFR BLD AUTO: 0 % (ref 0–1)
BILIRUB SERPL-MCNC: 0.2 MG/DL (ref 0.2–1)
BUN SERPL-MCNC: 10 MG/DL (ref 5–25)
CALCIUM ALBUM COR SERPL-MCNC: 11.4 MG/DL (ref 8.3–10.1)
CALCIUM SERPL-MCNC: 10.4 MG/DL (ref 8.3–10.1)
CHLORIDE SERPL-SCNC: 105 MMOL/L (ref 100–108)
CO2 SERPL-SCNC: 30 MMOL/L (ref 21–32)
CREAT SERPL-MCNC: 0.61 MG/DL (ref 0.6–1.3)
EOSINOPHIL # BLD AUTO: 0 THOUSAND/ΜL (ref 0–0.61)
EOSINOPHIL NFR BLD AUTO: 0 % (ref 0–6)
ERYTHROCYTE [DISTWIDTH] IN BLOOD BY AUTOMATED COUNT: 15 % (ref 11.6–15.1)
GFR SERPL CREATININE-BSD FRML MDRD: 98 ML/MIN/1.73SQ M
GLUCOSE SERPL-MCNC: 95 MG/DL (ref 65–140)
HCT VFR BLD AUTO: 37.5 % (ref 34.8–46.1)
HGB BLD-MCNC: 11.8 G/DL (ref 11.5–15.4)
LYMPHOCYTES # BLD AUTO: 1.15 THOUSANDS/ΜL (ref 0.6–4.47)
LYMPHOCYTES NFR BLD AUTO: 22 % (ref 14–44)
MAGNESIUM SERPL-MCNC: 1.5 MG/DL (ref 1.6–2.6)
MCH RBC QN AUTO: 27.6 PG (ref 26.8–34.3)
MCHC RBC AUTO-ENTMCNC: 31.5 G/DL (ref 31.4–37.4)
MCV RBC AUTO: 88 FL (ref 82–98)
MONOCYTES # BLD AUTO: 0.7 THOUSAND/ΜL (ref 0.17–1.22)
MONOCYTES NFR BLD AUTO: 14 % (ref 4–12)
NEUTROPHILS # BLD AUTO: 3.32 THOUSANDS/ΜL (ref 1.85–7.62)
NEUTS SEG NFR BLD AUTO: 64 % (ref 43–75)
PHOSPHATE SERPL-MCNC: 2.8 MG/DL (ref 2.3–4.1)
PLATELET # BLD AUTO: 297 THOUSANDS/UL (ref 149–390)
PMV BLD AUTO: 11.4 FL (ref 8.9–12.7)
POTASSIUM SERPL-SCNC: 3.6 MMOL/L (ref 3.5–5.3)
PREALB SERPL-MCNC: 17.3 MG/DL (ref 18–40)
PROT SERPL-MCNC: 6.5 G/DL (ref 6.4–8.2)
RBC # BLD AUTO: 4.28 MILLION/UL (ref 3.81–5.12)
SODIUM SERPL-SCNC: 140 MMOL/L (ref 136–145)
WBC # BLD AUTO: 5.19 THOUSAND/UL (ref 4.31–10.16)

## 2018-02-14 PROCEDURE — 84100 ASSAY OF PHOSPHORUS: CPT | Performed by: INTERNAL MEDICINE

## 2018-02-14 PROCEDURE — 83735 ASSAY OF MAGNESIUM: CPT | Performed by: INTERNAL MEDICINE

## 2018-02-14 PROCEDURE — 84134 ASSAY OF PREALBUMIN: CPT | Performed by: INTERNAL MEDICINE

## 2018-02-14 PROCEDURE — 80053 COMPREHEN METABOLIC PANEL: CPT | Performed by: INTERNAL MEDICINE

## 2018-02-14 PROCEDURE — 85025 COMPLETE CBC W/AUTO DIFF WBC: CPT | Performed by: INTERNAL MEDICINE

## 2018-02-14 NOTE — TELEPHONE ENCOUNTER
Aaron Bill from [address] [place] [address] called and states they need a verbal order to continue therapy and wound care   Call Aaron Bill at [phone]

## 2018-02-15 ENCOUNTER — TELEPHONE (OUTPATIENT)
Dept: INTERNAL MEDICINE CLINIC | Facility: CLINIC | Age: 83
End: 2018-02-15

## 2018-02-15 NOTE — TELEPHONE ENCOUNTER
Pt daughter Margie Monique notified  States pt does not eat much dairy- would like to know what else could be causing this?      Call Margie Amaya at [phone]

## 2018-02-15 NOTE — TELEPHONE ENCOUNTER
----- Message from Juan Hernandez MD sent at 2/15/2018 12:41 PM EST -----  Can speak to daughter  Her calcium levels remain high, need to limit amount of dairy, including yogurt (give oatmeal for breakfast instead)  Also would benefit with magnesium supplement 1 to 2 times a day - at least 400 mg daily  Need to increase protein intake,such as beans, fish, meat  Give protein shake 1-2x a day  Rest of labs okay, no more anemia  Strep culture was negative  . Patient did improve with antibiotic so will have him complete full course.

## 2018-02-15 NOTE — TELEPHONE ENCOUNTER
I thought she eats yogurt every day? That has a lot of calcium  Can also be in the multivitamin or other over the counter supplements  Should not be taking any of this

## 2018-02-19 ENCOUNTER — TELEPHONE (OUTPATIENT)
Dept: INTERNAL MEDICINE CLINIC | Facility: CLINIC | Age: 83
End: 2018-02-19

## 2018-02-19 NOTE — TELEPHONE ENCOUNTER
Left message to advise that Jennie had apt with Dr Reza Parmar today but Jennie does not need to come in  She was here last week   Advised to call if questions

## 2018-03-21 DIAGNOSIS — E78.2 MIXED HYPERLIPIDEMIA: Primary | ICD-10-CM

## 2018-03-22 RX ORDER — ATORVASTATIN CALCIUM 20 MG/1
TABLET, FILM COATED ORAL
Qty: 90 TABLET | Refills: 1 | Status: ON HOLD | OUTPATIENT
Start: 2018-03-22 | End: 2021-12-25 | Stop reason: SDUPTHER

## 2018-04-06 ENCOUNTER — TELEPHONE (OUTPATIENT)
Dept: INTERNAL MEDICINE CLINIC | Facility: CLINIC | Age: 83
End: 2018-04-06

## 2018-04-06 NOTE — TELEPHONE ENCOUNTER
Janice called from [facility] [address], in regards to patients wound located in sacral area  She states there is a pin point area that has puffiness and drainage  Pt is complaining of increased pain  Concerned she may develop abscess if wound closes completely  States pt also has low grade fever of 100 7 (today)  Please advise

## 2018-04-08 ENCOUNTER — HOSPITAL ENCOUNTER (INPATIENT)
Facility: HOSPITAL | Age: 83
LOS: 3 days | Discharge: HOME HEALTH | DRG: 871 | End: 2018-04-11
Attending: INTERNAL MEDICINE | Admitting: INTERNAL MEDICINE
Payer: COMMERCIAL

## 2018-04-08 ENCOUNTER — APPOINTMENT (EMERGENCY)
Dept: CT IMAGING | Facility: HOSPITAL | Age: 83
DRG: 871 | End: 2018-04-08
Payer: COMMERCIAL

## 2018-04-08 DIAGNOSIS — L89.152 DECUBITUS ULCER OF SACRAL REGION, STAGE 2 (HCC): Primary | ICD-10-CM

## 2018-04-08 DIAGNOSIS — L02.91 ABSCESS: ICD-10-CM

## 2018-04-08 DIAGNOSIS — E46 PROTEIN-CALORIE MALNUTRITION, UNSPECIFIED SEVERITY (HCC): ICD-10-CM

## 2018-04-08 PROBLEM — A41.9 SEPSIS (HCC): Status: ACTIVE | Noted: 2018-04-08

## 2018-04-08 LAB
ANION GAP SERPL CALCULATED.3IONS-SCNC: 6 MMOL/L (ref 4–13)
BASOPHILS # BLD AUTO: 0.03 THOUSANDS/ΜL (ref 0–0.1)
BASOPHILS NFR BLD AUTO: 0 % (ref 0–1)
BUN SERPL-MCNC: 10 MG/DL (ref 5–25)
CALCIUM SERPL-MCNC: 10.5 MG/DL (ref 8.3–10.1)
CHLORIDE SERPL-SCNC: 104 MMOL/L (ref 100–108)
CO2 SERPL-SCNC: 28 MMOL/L (ref 21–32)
CREAT SERPL-MCNC: 0.68 MG/DL (ref 0.6–1.3)
EOSINOPHIL # BLD AUTO: 0 THOUSAND/ΜL (ref 0–0.61)
EOSINOPHIL NFR BLD AUTO: 0 % (ref 0–6)
ERYTHROCYTE [DISTWIDTH] IN BLOOD BY AUTOMATED COUNT: 13.4 % (ref 11.6–15.1)
GFR SERPL CREATININE-BSD FRML MDRD: 94 ML/MIN/1.73SQ M
GLUCOSE SERPL-MCNC: 92 MG/DL (ref 65–140)
HCT VFR BLD AUTO: 34.4 % (ref 34.8–46.1)
HGB BLD-MCNC: 10.9 G/DL (ref 11.5–15.4)
LACTATE SERPL-SCNC: 1.3 MMOL/L (ref 0.5–2)
LYMPHOCYTES # BLD AUTO: 1.14 THOUSANDS/ΜL (ref 0.6–4.47)
LYMPHOCYTES NFR BLD AUTO: 10 % (ref 14–44)
MCH RBC QN AUTO: 26.3 PG (ref 26.8–34.3)
MCHC RBC AUTO-ENTMCNC: 31.7 G/DL (ref 31.4–37.4)
MCV RBC AUTO: 83 FL (ref 82–98)
MONOCYTES # BLD AUTO: 1.95 THOUSAND/ΜL (ref 0.17–1.22)
MONOCYTES NFR BLD AUTO: 17 % (ref 4–12)
NEUTROPHILS # BLD AUTO: 8.27 THOUSANDS/ΜL (ref 1.85–7.62)
NEUTS SEG NFR BLD AUTO: 73 % (ref 43–75)
PLATELET # BLD AUTO: 379 THOUSANDS/UL (ref 149–390)
PMV BLD AUTO: 9.9 FL (ref 8.9–12.7)
POTASSIUM SERPL-SCNC: 4.5 MMOL/L (ref 3.5–5.3)
RBC # BLD AUTO: 4.14 MILLION/UL (ref 3.81–5.12)
SODIUM SERPL-SCNC: 138 MMOL/L (ref 136–145)
WBC # BLD AUTO: 11.39 THOUSAND/UL (ref 4.31–10.16)

## 2018-04-08 PROCEDURE — 74177 CT ABD & PELVIS W/CONTRAST: CPT

## 2018-04-08 PROCEDURE — 99284 EMERGENCY DEPT VISIT MOD MDM: CPT

## 2018-04-08 PROCEDURE — 83605 ASSAY OF LACTIC ACID: CPT | Performed by: INTERNAL MEDICINE

## 2018-04-08 PROCEDURE — 85025 COMPLETE CBC W/AUTO DIFF WBC: CPT | Performed by: PHYSICIAN ASSISTANT

## 2018-04-08 PROCEDURE — 99223 1ST HOSP IP/OBS HIGH 75: CPT | Performed by: INTERNAL MEDICINE

## 2018-04-08 PROCEDURE — 80048 BASIC METABOLIC PNL TOTAL CA: CPT | Performed by: PHYSICIAN ASSISTANT

## 2018-04-08 PROCEDURE — 36415 COLL VENOUS BLD VENIPUNCTURE: CPT | Performed by: PHYSICIAN ASSISTANT

## 2018-04-08 PROCEDURE — 87040 BLOOD CULTURE FOR BACTERIA: CPT | Performed by: INTERNAL MEDICINE

## 2018-04-08 RX ORDER — SENNOSIDES 8.6 MG
1 TABLET ORAL DAILY
Status: DISCONTINUED | OUTPATIENT
Start: 2018-04-09 | End: 2018-04-08

## 2018-04-08 RX ORDER — SODIUM CHLORIDE 9 MG/ML
50 INJECTION, SOLUTION INTRAVENOUS CONTINUOUS
Status: DISCONTINUED | OUTPATIENT
Start: 2018-04-08 | End: 2018-04-09

## 2018-04-08 RX ORDER — QUETIAPINE FUMARATE 25 MG/1
25 TABLET, FILM COATED ORAL
Status: DISCONTINUED | OUTPATIENT
Start: 2018-04-08 | End: 2018-04-08

## 2018-04-08 RX ORDER — DOCUSATE SODIUM 100 MG/1
100 CAPSULE, LIQUID FILLED ORAL DAILY
Status: DISCONTINUED | OUTPATIENT
Start: 2018-04-09 | End: 2018-04-08 | Stop reason: SDUPTHER

## 2018-04-08 RX ORDER — TRAMADOL HYDROCHLORIDE 50 MG/1
50 TABLET ORAL EVERY 6 HOURS PRN
Status: DISCONTINUED | OUTPATIENT
Start: 2018-04-08 | End: 2018-04-11 | Stop reason: HOSPADM

## 2018-04-08 RX ORDER — POTASSIUM CHLORIDE 750 MG/1
10 TABLET, EXTENDED RELEASE ORAL DAILY
Status: DISCONTINUED | OUTPATIENT
Start: 2018-04-09 | End: 2018-04-08

## 2018-04-08 RX ORDER — POLYVINYL ALCOHOL 14 MG/ML
1 SOLUTION/ DROPS OPHTHALMIC
Status: DISCONTINUED | OUTPATIENT
Start: 2018-04-08 | End: 2018-04-11 | Stop reason: HOSPADM

## 2018-04-08 RX ORDER — CHLORAL HYDRATE 500 MG
1000 CAPSULE ORAL DAILY
Status: DISCONTINUED | OUTPATIENT
Start: 2018-04-09 | End: 2018-04-08

## 2018-04-08 RX ORDER — FUROSEMIDE 20 MG/1
20 TABLET ORAL DAILY
Status: DISCONTINUED | OUTPATIENT
Start: 2018-04-09 | End: 2018-04-08

## 2018-04-08 RX ORDER — POTASSIUM CHLORIDE 20 MEQ/1
20 TABLET, EXTENDED RELEASE ORAL DAILY
Status: DISCONTINUED | OUTPATIENT
Start: 2018-04-09 | End: 2018-04-08 | Stop reason: ALTCHOICE

## 2018-04-08 RX ORDER — ATORVASTATIN CALCIUM 20 MG/1
20 TABLET, FILM COATED ORAL
Status: DISCONTINUED | OUTPATIENT
Start: 2018-04-08 | End: 2018-04-11 | Stop reason: HOSPADM

## 2018-04-08 RX ORDER — LOSARTAN POTASSIUM 50 MG/1
100 TABLET ORAL DAILY
Status: DISCONTINUED | OUTPATIENT
Start: 2018-04-09 | End: 2018-04-08

## 2018-04-08 RX ORDER — ALENDRONATE SODIUM 70 MG/1
70 TABLET ORAL
Status: DISCONTINUED | OUTPATIENT
Start: 2018-04-09 | End: 2018-04-08

## 2018-04-08 RX ORDER — MELATONIN
2000 DAILY
Status: DISCONTINUED | OUTPATIENT
Start: 2018-04-09 | End: 2018-04-11 | Stop reason: HOSPADM

## 2018-04-08 RX ORDER — NAPROXEN 250 MG/1
250 TABLET ORAL 2 TIMES DAILY
Status: DISCONTINUED | OUTPATIENT
Start: 2018-04-08 | End: 2018-04-08

## 2018-04-08 RX ORDER — ONDANSETRON 2 MG/ML
4 INJECTION INTRAMUSCULAR; INTRAVENOUS EVERY 6 HOURS PRN
Status: DISCONTINUED | OUTPATIENT
Start: 2018-04-08 | End: 2018-04-11 | Stop reason: HOSPADM

## 2018-04-08 RX ORDER — DOCUSATE SODIUM 100 MG/1
100 CAPSULE, LIQUID FILLED ORAL 2 TIMES DAILY
Status: DISCONTINUED | OUTPATIENT
Start: 2018-04-08 | End: 2018-04-08

## 2018-04-08 RX ORDER — CHOLECALCIFEROL (VITAMIN D3) 125 MCG
1000 CAPSULE ORAL DAILY
Status: DISCONTINUED | OUTPATIENT
Start: 2018-04-09 | End: 2018-04-08 | Stop reason: SDUPTHER

## 2018-04-08 RX ORDER — ACETAMINOPHEN 325 MG/1
650 TABLET ORAL EVERY 6 HOURS PRN
Status: DISCONTINUED | OUTPATIENT
Start: 2018-04-08 | End: 2018-04-11 | Stop reason: HOSPADM

## 2018-04-08 RX ORDER — CHOLECALCIFEROL (VITAMIN D3) 125 MCG
100 CAPSULE ORAL 2 TIMES DAILY
Status: DISCONTINUED | OUTPATIENT
Start: 2018-04-08 | End: 2018-04-08

## 2018-04-08 RX ORDER — LOSARTAN POTASSIUM 50 MG/1
100 TABLET ORAL DAILY
Status: DISCONTINUED | OUTPATIENT
Start: 2018-04-09 | End: 2018-04-08 | Stop reason: SDUPTHER

## 2018-04-08 RX ORDER — MAGNESIUM HYDROXIDE/ALUMINUM HYDROXICE/SIMETHICONE 120; 1200; 1200 MG/30ML; MG/30ML; MG/30ML
15 SUSPENSION ORAL EVERY 6 HOURS PRN
Status: DISCONTINUED | OUTPATIENT
Start: 2018-04-08 | End: 2018-04-11 | Stop reason: HOSPADM

## 2018-04-08 RX ORDER — CHOLECALCIFEROL (VITAMIN D3) 125 MCG
1000 CAPSULE ORAL DAILY
Status: DISCONTINUED | OUTPATIENT
Start: 2018-04-09 | End: 2018-04-08

## 2018-04-08 RX ADMIN — Medication 100 MG: at 21:39

## 2018-04-08 RX ADMIN — METRONIDAZOLE 500 MG: 500 SOLUTION INTRAVENOUS at 21:40

## 2018-04-08 RX ADMIN — Medication 400 MG: at 23:26

## 2018-04-08 RX ADMIN — ACETAMINOPHEN 650 MG: 325 TABLET ORAL at 21:39

## 2018-04-08 RX ADMIN — CEFEPIME HYDROCHLORIDE 1000 MG: 1 INJECTION, SOLUTION INTRAVENOUS at 20:48

## 2018-04-08 RX ADMIN — SODIUM CHLORIDE 500 ML: 0.9 INJECTION, SOLUTION INTRAVENOUS at 18:00

## 2018-04-08 RX ADMIN — IOHEXOL 100 ML: 350 INJECTION, SOLUTION INTRAVENOUS at 16:03

## 2018-04-08 RX ADMIN — ATORVASTATIN CALCIUM 20 MG: 20 TABLET, FILM COATED ORAL at 21:39

## 2018-04-08 RX ADMIN — SODIUM CHLORIDE 50 ML/HR: 0.9 INJECTION, SOLUTION INTRAVENOUS at 23:27

## 2018-04-08 NOTE — H&P
History and Physical - [facility]    Patient Information: Nicole Del Real 80 y o  female MRN: [id number]  Unit/Bed#: ED 05 Encounter: [id number]  Admitting Physician: Karlene Mendoza MD  PCP: Anju Parra MD  Date of Admission:  04/08/18    Assessment/Plan:    Hospital Problem List:     Principal Problem:    Sepsis [facility])  Active Problems:    Hypercalcemia    HTN (hypertension), benign    Alzheimer's disease    Protein-calorie malnutrition ([facility] [address] )    Osteoporosis    Skin ulcer of sacrum with fat layer exposed ([facility] [address] )    Abscess      Plan for the Primary Problem(s):    · Sepsis given fever leukocytosis will check lactic acid, check blood cultures  · Sacral Decubitus ulcer with abscess will provide her with IV cefepime metronidazole, infectious Disease, surgery consultation, wound care, frequent position change    Plan for Additional Problems:     · Severe protein calorie malnutrition encourage p o  intake  · Dementia monitor for delirium  · Hypercalcemia monitor  · Hypertension  · Out of bed as able      Discussed with the patient and her daughter at bedside in detail  Daughter is the POA she is agreeable with ongoing management and consult requested      VTE Prophylaxis: Enoxaparin (Lovenox)  / sequential compression device   Code Status:  Full code  POLST: There is no POLST form on file for this patient (pre-hospital)    Anticipated Length of Stay:  Patient will be admitted on an Inpatient basis with an anticipated length of stay of  More than 2 midnights  Justification for Hospital Stay:  Decubitus ulcer/abscess requiring further evaluation and management as outlined      Chief Complaint:       Worsening decubitus ulcer and drainage      History of Present Illness:    Nicole Del Real is a 80 y o  female who presents with worsening decubitus ulcer and drainage from the sacral ulcer since last for 5 days  History is obtained from the daughter at bedside as well as the ED physician    Patient has history of dementia and is a poor historian  Daughter reports 4-5 days history of worsening decubitus ulcer as well as purulent discharge and foul smell  The visiting nurses noticed increased purulence a mild degree of fever was also reported, per the daughter patient is lethargic sleeping more than usual poor appetite and generalized weakness with not doing well  No urinary symptoms  No history of cough chest tightness wheezing  Denies chest pain diaphoresis palpitations presyncope syncope  Patient has history of chronic ambulate dysfunction and ambulates with a walker  No history of falls recently  Review of Systems:    Review of Systems   Unable to perform ROS: Dementia       Past Medical and Surgical History:     Past Medical History:   Diagnosis Date    Acute hepatitis C 12/30/2012    Anxiety     Cellulitis of left leg 09/03/2014    Depression     Gallbladder disease 08/28/2012    HLD (hyperlipidemia) 11/24/2017    HTN (hypertension), benign 11/24/2017    Mass of right side of neck 09/08/2016    Peripheral edema 11/24/2017    Psychiatric illness     Psychosis     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  Authorizing Provider   acetaminophen (TYLENOL) 325 mg tablet Take 2 tablets by mouth every 6 (six) hours as needed for fever 12/5/17   Yasmin Benton MD   alendronate (FOSAMAX) 70 mg tablet Take by mouth 10/21/16   Historical Provider, MD   atorvastatin (LIPITOR) 20 mg tablet TAKE 1 TABLET AT BEDTIME 3/22/18   Steve Kc MD   Cholecalciferol (VITAMIN D) 2000 units CAPS Take 1 capsule by mouth daily 1/9/18   Historical Provider, MD   Coenzyme Q10 (CO Q 10) 100 MG CAPS Take 1 capsule by mouth 2 (two) times a day 12/28/12   Historical Provider, MD   CVS D3 1000 units capsule TAKE 1 CPASULE DAILY   12/18/17   Historical Provider, MD   cyanocobalamin (CVS VITAMIN B-12) 1000 MCG tablet Take 1 tablet by mouth daily 6/28/17   Historical Provider, MD   cyanocobalamin (VITAMIN B-12) 1,000 mcg tablet Take 1 tablet by mouth daily 9/11/17   Historical Provider, MD   docusate sodium (COLACE) 100 mg capsule Take 1 capsule by mouth 2 (two) times a day 12/5/17   Eduard Holland MD   docusate sodium (COLACE) 100 mg capsule Take by mouth 1/9/18   Historical Provider, MD   furosemide (LASIX) 20 mg tablet Take 1 tablet by mouth daily 12/30/12   Historical Provider, MD   losartan (COZAAR) 100 MG tablet Take 100 mg by mouth daily    Historical Provider, MD   losartan (COZAAR) 100 MG tablet Take 1 tablet by mouth daily 12/30/12   Historical Provider, MD   Magnesium Oxide 400 MG CAPS Take 1 capsule by mouth daily 9/9/16   Historical Provider, MD   metoprolol tartrate (LOPRESSOR) 25 mg tablet Take 25 mg by mouth every 12 (twelve) hours    Historical Provider, MD   metoprolol tartrate (LOPRESSOR) 25 mg tablet Take 1 tablet by mouth 2 (two) times a day 10/18/17   Historical Provider, MD   Multiple Vitamins-Minerals (ZINC PO) Take by mouth    Historical Provider, MD   multivitamin (THERAGRAN) TABS Take 1 tablet by mouth daily 12/5/17   Eduard Holland MD   Naproxen Sodium (ALEVE) 220 MG CAPS Take 1 capsule by mouth 2 (two) times a day 9/3/14   Historical Provider, MD   Omega-3 Fatty Acids (OMEGA-3 FISH OIL) 1000 MG CAPS Take 1 capsule by mouth daily 12/28/12   Historical Provider, MD   polyvinyl alcohol (LIQUIFILM TEARS) 1 4 % ophthalmic solution Administer 1 drop to both eyes every 3 (three) hours as needed for dry eyes 12/5/17   Eduard Holland MD   potassium chloride (K-DUR,KLOR-CON) 20 mEq tablet Take by mouth 12/4/12   Historical Provider, MD   potassium chloride (MICRO-K) 10 MEQ CR capsule Take 1 capsule by mouth daily 11/13/17   Historical Provider, MD   QUEtiapine (SEROquel) 25 mg tablet Take 1 tablet (25 mg total) by mouth daily at bedtime 2/13/18   Asael Mccall MD   SANTYL ointment APPLY TO WOUND DAILY FOR 2 WEEKS, CLEAN WOUND BEFORE APPLY, USE NORMAL SALINE 12/28/17   Historical Provider, MD   traMADol Tl Exon) 50 mg tablet Take by mouth 12/18/17   Historical Provider, MD     I have reviewed home medications with patient family member  Allergies:    Allergies   Allergen Reactions    Amoxicillin     Dilantin [Phenytoin]     Erythromycin        Social History:     Marital Status:    Occupation:   Patient Pre-hospital Living Situation: home  Patient Pre-hospital Level of Mobility:  Ambulatory dysfunction, ambulates with a walker  Patient Pre-hospital Diet Restrictions: no  Substance Use History:   History   Alcohol Use No     History   Smoking Status    Former Smoker   Smokeless Tobacco    Never Used     History   Drug Use No       Family History:    Family History   Problem Relation Age of Onset    Coronary artery disease Mother     No Known Problems Father     No Known Problems Sister     Alcohol abuse Brother     Heart disease Brother     Cancer Brother     No Known Problems Maternal Aunt     No Known Problems Paternal Aunt     No Known Problems Maternal Uncle     No Known Problems Paternal Uncle     No Known Problems Maternal Grandfather     No Known Problems Maternal Grandmother     No Known Problems Paternal Grandfather     No Known Problems Paternal Grandmother     No Known Problems Cousin     ADD / ADHD Neg Hx     Anxiety disorder Neg Hx     Bipolar disorder Neg Hx     Dementia Neg Hx     Depression Neg Hx     Drug abuse Neg Hx     OCD Neg Hx     Paranoid behavior Neg Hx     Schizophrenia Neg Hx     Seizures Neg Hx     Self-Injury Neg Hx     Suicide Attempts Neg Hx        Physical Exam:     Vitals:   Blood Pressure: 93/52 (04/08/18 1733)  Pulse: 104 (04/08/18 1733)  Temperature: 99 1 °F (37 3 °C) (04/08/18 1202)  Temp Source: Oral (04/08/18 1202)  Respirations: 16 (04/08/18 1733)  SpO2: 97 % (04/08/18 1733)    Physical Exam    Features of severe protein calorie malnutrition noted  Comfortably lying in bed  Neck supple  Lungs diminished breath sounds at bases  Heart sounds S1-S2 noted tachycardia noted  Abdomen soft nontender  Pulses present  Sacral decubitus ulcer with abscess pointing and purulent discharge noted  Pedal edema noted  Awake obeys simple commands  No rash    Additional Data:     Lab Results: I have personally reviewed pertinent reports  Results from last 7 days  Lab Units 04/08/18  1504   WBC Thousand/uL 11 39*   HEMOGLOBIN g/dL 10 9*   HEMATOCRIT % 34 4*   PLATELETS Thousands/uL 379   NEUTROS PCT % 73   LYMPHS PCT % 10*   MONOS PCT % 17*   EOS PCT % 0       Results from last 7 days  Lab Units 04/08/18  1504   SODIUM mmol/L 138   POTASSIUM mmol/L 4 5   CHLORIDE mmol/L 104   CO2 mmol/L 28   BUN mg/dL 10   CREATININE mg/dL 0 68   CALCIUM mg/dL 10 5*   GLUCOSE RANDOM mg/dL 92           Imaging: I have personally reviewed pertinent reports  Ct Abdomen Pelvis With Contrast    Result Date: 4/8/2018  Narrative: CT ABDOMEN AND PELVIS WITH IV CONTRAST INDICATION:   gluteal/sacral decub ulcer with possible abscess  COMPARISON: None  TECHNIQUE:  CT examination of the abdomen and pelvis was performed  Axial, sagittal, and coronal 2D reformatted images were created from the source data and submitted for interpretation  Radiation dose length product (DLP) for this visit:  237 mGy-cm   This examination, like all CT scans performed in the [place], was performed utilizing techniques to minimize radiation dose exposure, including the use of iterative reconstruction and automated exposure control  IV Contrast:  100 mL of iohexol (OMNIPAQUE) Enteric Contrast:  Enteric contrast was not administered   FINDINGS: ABDOMEN LOWER CHEST:  No clinically significant abnormality identified in the visualized lower chest  LIVER/BILIARY TREE:  Unremarkable  GALLBLADDER:  Gallbladder is surgically absent  SPLEEN:  Unremarkable  PANCREAS:  Unremarkable  ADRENAL GLANDS:  Unremarkable  KIDNEYS/URETERS:  Unremarkable  No hydronephrosis  STOMACH AND BOWEL:  There is colonic diverticulosis without evidence of acute diverticulitis  APPENDIX:  No findings to suggest appendicitis  ABDOMINOPELVIC CAVITY:  No ascites or free intraperitoneal air  No lymphadenopathy  VESSELS:  More significant atherosclerotic vascular calcifications are noted than would be expected for the patient's age  No evidence of aortic aneurysm  PELVIS REPRODUCTIVE ORGANS:  Patient is status post hysterectomy  URINARY BLADDER:  Unremarkable  ABDOMINAL WALL/INGUINAL REGIONS:  There are 2 subcutaneous fluid collections posterior to the coccyx, measuring 3 3 x 2 7 cm at the right paramedian aspect on image 2/58, and a 2nd collection slightly more inferiorly in the midline measuring 4 2 x 3 8 cm  These collections might communicate with one another, and are compatible with abscesses  OSSEOUS STRUCTURES:  No acute fracture or destructive osseous lesion  Impression: Decubitus subcutaneous ulcer(s) posterior to the coccyx  Colonic diverticulosis without diverticulitis  Workstation performed: [id number]       EKG, Pathology, and Other Studies Reviewed on Admission:   · EKG:  Normal sinus tachycardia on telemetry    Allscripts / Epic Records Reviewed: Yes     ** Please Note: This note has been constructed using a voice recognition system   **

## 2018-04-08 NOTE — ED PROVIDER NOTES
History  Chief Complaint   Patient presents with    Abscess     pt has had a bed sore since december 5th  pt is seeing vna and they were concerned about an abscess to the area since she has had a fever since friday of 100     Patient is a 80 year female with past medical history of hypertension, hyperlipidemia, sellar bowel or artery aneurysm, and hepatitis C presents to the emergency department for evaluation of decubitus ulcer  History mostly obtained from patient's daughter  Patient was reportedly admitted to a nursing facility back in December where she developed after 5 days a large sacral decubitus ulcer  Daughter states that with home care and visiting nurses the wound has drastically improved in size  For last 2-3 days daughter states that mother has been reporting increased pain at the site  Mom visiting nurses states the patient has been having low-grade fevers of 100 5 I believe that there is a little area that is more fluctuant and are concerned for underlying abscess  Patient states that she feels tired but otherwise fine  No headaches, dizziness, lightheadedness, chest pain, shortness of breath, abdominal pain, nausea, vomiting, diarrhea  Patient with no other complaints and states that when she is lying on her back the wound is not bothering her too much         History provided by:  Patient  Abscess   Location:  Pelvis  Pelvic abscess location:  Gluteal cleft  Size:  2  Abscess quality: draining, painful, redness and warmth    Red streaking: no    Duration:  3 days  Progression:  Worsening  Pain details:     Quality:  Sharp    Severity:  Mild    Duration:  3 days    Timing:  Constant    Progression:  Worsening  Chronicity:  Recurrent  Context: skin injury    Context: not diabetes and not immunosuppression    Relieved by:  Draining/squeezing  Exacerbated by: Pressure    Ineffective treatments:  None tried  Associated symptoms: no fatigue, no fever, no headaches, no nausea and no vomiting Risk factors: no family hx of MRSA and no prior abscess        Prior to Admission Medications   Prescriptions Last Dose Informant Patient Reported? Taking? CVS D3 1000 units capsule  Child Yes No   Sig: TAKE 1 CPASULE DAILY     Cholecalciferol (VITAMIN D) 2000 units CAPS  Child Yes No   Sig: Take 1 capsule by mouth daily   Coenzyme Q10 (CO Q 10) 100 MG CAPS  Child Yes No   Sig: Take 1 capsule by mouth 2 (two) times a day   Magnesium Oxide 400 MG CAPS  Child Yes No   Sig: Take 1 capsule by mouth daily   Multiple Vitamins-Minerals (ZINC PO)  Child Yes No   Sig: Take by mouth   Naproxen Sodium (ALEVE) 220 MG CAPS  Child Yes No   Sig: Take 1 capsule by mouth 2 (two) times a day   Omega-3 Fatty Acids (OMEGA-3 FISH OIL) 1000 MG CAPS  Child Yes No   Sig: Take 1 capsule by mouth daily   QUEtiapine (SEROquel) 25 mg tablet   No No   Sig: Take 1 tablet (25 mg total) by mouth daily at bedtime   SANTYL ointment  Child Yes No   Sig: APPLY TO WOUND DAILY FOR 2 WEEKS, CLEAN WOUND BEFORE APPLY, USE NORMAL SALINE   acetaminophen (TYLENOL) 325 mg tablet  Child No No   Sig: Take 2 tablets by mouth every 6 (six) hours as needed for fever   alendronate (FOSAMAX) 70 mg tablet  Child Yes No   Sig: Take by mouth   atorvastatin (LIPITOR) 20 mg tablet   No No   Sig: TAKE 1 TABLET AT BEDTIME   cyanocobalamin (CVS VITAMIN B-12) 1000 MCG tablet  Child Yes No   Sig: Take 1 tablet by mouth daily   cyanocobalamin (VITAMIN B-12) 1,000 mcg tablet  Child Yes No   Sig: Take 1 tablet by mouth daily   docusate sodium (COLACE) 100 mg capsule  Child No No   Sig: Take 1 capsule by mouth 2 (two) times a day   docusate sodium (COLACE) 100 mg capsule  Child Yes No   Sig: Take by mouth   furosemide (LASIX) 20 mg tablet  Child Yes No   Sig: Take 1 tablet by mouth daily   losartan (COZAAR) 100 MG tablet  Child Yes No   Sig: Take 100 mg by mouth daily   losartan (COZAAR) 100 MG tablet  Child Yes No   Sig: Take 1 tablet by mouth daily   metoprolol tartrate (LOPRESSOR) 25 mg tablet  Child Yes No   Sig: Take 25 mg by mouth every 12 (twelve) hours   metoprolol tartrate (LOPRESSOR) 25 mg tablet  Child Yes No   Sig: Take 1 tablet by mouth 2 (two) times a day   multivitamin (THERAGRAN) TABS  Child No No   Sig: Take 1 tablet by mouth daily   polyvinyl alcohol (LIQUIFILM TEARS) 1 4 % ophthalmic solution  Child No No   Sig: Administer 1 drop to both eyes every 3 (three) hours as needed for dry eyes   potassium chloride (K-DUR,KLOR-CON) 20 mEq tablet  Child Yes No   Sig: Take by mouth   potassium chloride (MICRO-K) 10 MEQ CR capsule  Child Yes No   Sig: Take 1 capsule by mouth daily   traMADol (ULTRAM) 50 mg tablet  Child Yes No   Sig: Take by mouth      Facility-Administered Medications: None       Past Medical History:   Diagnosis Date    Acute hepatitis C 12/30/2012    Anxiety     Cellulitis of left leg 09/03/2014    Depression     Gallbladder disease 08/28/2012    HLD (hyperlipidemia) 11/24/2017    HTN (hypertension), benign 11/24/2017    Mass of right side of neck 09/08/2016    Peripheral edema 11/24/2017    Psychiatric illness     Psychosis     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    S/P clipping       Past Surgical History:   Procedure Laterality Date    APPENDECTOMY      CEREBRAL ANEURYSM REPAIR      CHOLECYSTECTOMY      HYSTERECTOMY         Family History   Problem Relation Age of Onset    Coronary artery disease Mother     No Known Problems Father     No Known Problems Sister     Alcohol abuse Brother     Heart disease Brother     Cancer Brother     No Known Problems Maternal Aunt     No Known Problems Paternal Aunt     No Known Problems Maternal Uncle     No Known Problems Paternal Uncle     No Known Problems Maternal Grandfather     No Known Problems Maternal Grandmother     No Known Problems Paternal Grandfather     No Known Problems Paternal Grandmother     No Known Problems Cousin     ADD / ADHD Neg Hx     Anxiety disorder Neg Hx     Bipolar disorder Neg Hx     Dementia Neg Hx     Depression Neg Hx     Drug abuse Neg Hx     OCD Neg Hx     Paranoid behavior Neg Hx     Schizophrenia Neg Hx     Seizures Neg Hx     Self-Injury Neg Hx     Suicide Attempts Neg Hx      I have reviewed and agree with the history as documented  Social History   Substance Use Topics    Smoking status: Former Smoker    Smokeless tobacco: Never Used    Alcohol use No        Review of Systems   Constitutional: Negative for activity change, chills, fatigue and fever  HENT: Negative for congestion, ear pain, mouth sores, sore throat and trouble swallowing  Eyes: Negative for photophobia and visual disturbance  Respiratory: Negative for chest tightness, shortness of breath and wheezing  Cardiovascular: Negative for chest pain and palpitations  Gastrointestinal: Negative for abdominal pain, constipation, diarrhea, nausea and vomiting  Genitourinary: Negative for decreased urine volume, difficulty urinating, dysuria, genital sores and hematuria  Musculoskeletal: Negative for arthralgias, myalgias, neck pain and neck stiffness  Skin: Positive for wound  Negative for rash  Neurological: Negative for dizziness, syncope, weakness, light-headedness, numbness and headaches  Hematological: Negative for adenopathy  All other systems reviewed and are negative        Physical Exam  ED Triage Vitals   Temperature Pulse Respirations Blood Pressure SpO2   04/08/18 1202 04/08/18 1202 04/08/18 1202 04/08/18 1202 04/08/18 1202   99 1 °F (37 3 °C) 104 18 120/58 97 %      Temp Source Heart Rate Source Patient Position - Orthostatic VS BP Location FiO2 (%)   04/08/18 1202 04/08/18 1202 04/08/18 1441 04/08/18 1202 --   Oral Monitor Lying Left arm       Pain Score       04/08/18 1202       8           Orthostatic Vital Signs  Vitals:    04/08/18 1733 04/08/18 2006 04/08/18 2109 04/08/18 2300   BP: 93/52 149/61 139/62 100/50   Pulse: 104 103 97 96   Patient Position - Orthostatic VS: Lying Lying Lying Lying       Physical Exam   Constitutional: She is oriented to person, place, and time  She appears well-developed and well-nourished  No distress  HENT:   Head: Normocephalic and atraumatic  Right Ear: External ear normal    Left Ear: External ear normal    Nose: Nose normal    Mouth/Throat: Oropharynx is clear and moist    Eyes: Conjunctivae and EOM are normal  Pupils are equal, round, and reactive to light  No scleral icterus  Neck: Normal range of motion  Neck supple  Cardiovascular: Normal rate, regular rhythm, normal heart sounds and intact distal pulses  Exam reveals no gallop and no friction rub  No murmur heard  Pulmonary/Chest: Effort normal and breath sounds normal  No respiratory distress  She has no wheezes  Abdominal: Soft  She exhibits no distension  There is no tenderness  There is no guarding  Musculoskeletal: Normal range of motion  She exhibits no edema, tenderness or deformity  Lymphadenopathy:     She has no cervical adenopathy  Neurological: She is alert and oriented to person, place, and time  No cranial nerve deficit or sensory deficit  She exhibits normal muscle tone  Skin: Skin is warm and dry  Capillary refill takes less than 2 seconds  She is not diaphoretic  No erythema  Nursing note and vitals reviewed        ED Medications  Medications   acetaminophen (TYLENOL) tablet 650 mg (650 mg Oral Given 4/8/18 2139)   polyvinyl alcohol (LIQUIFILM TEARS) 1 4 % ophthalmic solution 1 drop (not administered)   traMADol (ULTRAM) tablet 50 mg (not administered)   cholecalciferol (VITAMIN D3) tablet 2,000 Units (not administered)   atorvastatin (LIPITOR) tablet 20 mg (20 mg Oral Given 4/8/18 2139)   sodium chloride 0 9 % infusion (50 mL/hr Intravenous New Bag 4/8/18 2327)   ondansetron (ZOFRAN) injection 4 mg (not administered)   aluminum-magnesium hydroxide-simethicone (MYLANTA) 200-200-20 mg/5 mL oral suspension 15 mL (not administered)   enoxaparin (LOVENOX) subcutaneous injection 30 mg (not administered)   cefepime (MAXIPIME) IVPB (premix) 1,000 mg (1,000 mg Intravenous New Bag 4/8/18 2048)   metroNIDAZOLE (FLAGYL) IVPB (premix) 500 mg (500 mg Intravenous New Bag 4/8/18 2140)   magnesium oxide (MAG-OX) tablet 400 mg (400 mg Oral Given 4/8/18 2326)   iohexol (OMNIPAQUE) 350 MG/ML injection (MULTI-DOSE) 100 mL (100 mL Intravenous Given 4/8/18 1603)   sodium chloride 0 9 % bolus 500 mL (0 mL Intravenous Stopped 4/8/18 2034)       Diagnostic Studies  Results Reviewed     Procedure Component Value Units Date/Time    Lactic acid, plasma [[id number]]  (Normal) Collected:  04/08/18 2013    Lab Status:  Final result Specimen:  Blood from Arm, Left Updated:  04/08/18 2045     LACTIC ACID 1 3 mmol/L     Narrative:         Result may be elevated if tourniquet was used during collection  Blood culture [[id number]] Collected:  04/08/18 2013    Lab Status: In process Specimen:  Blood from Arm, Left Updated:  04/08/18 2022    Blood culture [[id number]] Collected:  04/08/18 2013    Lab Status: In process Specimen:  Blood from Arm, Right Updated:  04/08/18 5887    Basic metabolic panel [[id number]]  (Abnormal) Collected:  04/08/18 1504    Lab Status:  Final result Specimen:  Blood from Arm, Right Updated:  04/08/18 1525     Sodium 138 mmol/L      Potassium 4 5 mmol/L      Chloride 104 mmol/L      CO2 28 mmol/L      Anion Gap 6 mmol/L      BUN 10 mg/dL      Creatinine 0 68 mg/dL      Glucose 92 mg/dL      Calcium 10 5 (H) mg/dL      eGFR 94 ml/min/1 73sq m     Narrative:         National Kidney Disease Education Program recommendations are as follows:  GFR calculation is accurate only with a steady state creatinine  Chronic Kidney disease less than 60 ml/min/1 73 sq  meters  Kidney failure less than 15 ml/min/1 73 sq  meters      CBC and differential [[id number]]  (Abnormal) Collected:  04/08/18 1504    Lab Status:  Final result Specimen:  Blood from Arm, Right Updated:  04/08/18 1515     WBC 11 39 (H) Thousand/uL      RBC 4 14 Million/uL      Hemoglobin 10 9 (L) g/dL      Hematocrit 34 4 (L) %      MCV 83 fL      MCH 26 3 (L) pg      MCHC 31 7 g/dL      RDW 13 4 %      MPV 9 9 fL      Platelets 294 Thousands/uL      Neutrophils Relative 73 %      Lymphocytes Relative 10 (L) %      Monocytes Relative 17 (H) %      Eosinophils Relative 0 %      Basophils Relative 0 %      Neutrophils Absolute 8 27 (H) Thousands/µL      Lymphocytes Absolute 1 14 Thousands/µL      Monocytes Absolute 1 95 (H) Thousand/µL      Eosinophils Absolute 0 00 Thousand/µL      Basophils Absolute 0 03 Thousands/µL                  CT abdomen pelvis with contrast   Final Result by Marge Benson MD (04/08 1620)      Decubitus subcutaneous ulcer(s) posterior to the coccyx  Colonic diverticulosis without diverticulitis  Workstation performed: [id number]                    Procedures  Procedures       Phone Contacts  ED Phone Contact    ED Course  ED Course                          Initial Sepsis Screening     [address] Name 04/08/18 3514                Is the patient's history suggestive of a new or worsening infection? (!)  Yes (Proceed)  -MD        Suspected source of infection soft tissue  -MD        Are two or more of the following signs & symptoms of infection both present and new to the patient? No  -MD        Indicate SIRS criteria Tachycardia > 90 bpm  -MD        If the answer is yes to both questions, suspicion of sepsis is present          If severe sepsis is present AND tissue hypoperfusion perists in the hour after fluid resuscitation or lactate > 4, the patient meets criteria for SEPTIC SHOCK          Are any of the following organ dysfunction criteria present within 6 hours of suspected infection and SIRS criteria that are NOT considered to be chronic conditions?           Organ dysfunction          Date of presentation of severe sepsis   Time of presentation of severe sepsis          Tissue hypoperfusion persists in the hour after crystalloid fluid administration, evidenced, by either:          Was hypotension present within one hour of the conclusion of crystalloid fluid administration?         Date of presentation of septic shock          Time of presentation of septic shock            User Key  (r) = Recorded By, (t) = Taken By, (c) = Cosigned By    Initials Name Provider Type    MD Kailee Altamirano, NORMAN Physician Assistant                  MDM  Number of Diagnoses or Management Options  Abscess: new and requires workup  Decubitus ulcer of sacral region, stage 2: new and requires workup  Diagnosis management comments: Differential diagnosis to include but not limited to:  Decubitus ulcer, cellulitis, abscess, fourniers gangrene,     Patient is a 3year-old female presents emergency department for evaluation of wound  Patient has a chronic sacral decubitus ulcer wound that has drastically improved over the last 3 months  Over last 2-3 days patient has been reporting worsening pain at the site  Visiting nurses state that the wound is having more discharge than normal as well as low-grade fevers in the patient and some swelling redness noted as well  Their concern for abscess and would like patient evaluated  Patient otherwise feels okay, states that she is just tired  States the pain is worse when she is lying on her back but is not having any pain currently laying on her side  There is a wound noted in the intergluteal cleft, that is about 2 cm in diameter  There is some white drainage coming from wound, CT to rule out deep abscess  Will further discuss with the surgery and likely have patient follow up with wound care         Amount and/or Complexity of Data Reviewed  Clinical lab tests: ordered and reviewed  Tests in the radiology section of CPT®: ordered and reviewed    Risk of Complications, Morbidity, and/or Mortality  Presenting problems: moderate  Diagnostic procedures: moderate  Management options: moderate  General comments: Discussed patient with Dr Jose E Kennedy with surgeries appears the patient does have abscess behind coccygeal ulcer  He believes that this should be treated medically with IV and requests I discussed with slim  He states he will follow patient as a consultant if surgical intervention is needed  Discussed with shreya who accepts patient on admission for IV antibiotics for infected decubitus ulcer with underlying abscess  Antibiotics delayed in emergency department as we did not have a weight documented for patient  Patient Progress  Patient progress: stable    CritCare Time    Disposition  Final diagnoses:   Decubitus ulcer of sacral region, stage 2   Abscess     Time reflects when diagnosis was documented in both MDM as applicable and the Disposition within this note     Time User Action Codes Description Comment    4/8/2018  5:55 PM Eugenie Mckenna Add [L89 152] Decubitus ulcer of sacral region, stage 2     4/8/2018  5:55 PM Eugenie Mckenna Add [L02 91] Abscess     4/8/2018  7:03 PM Jolynn HOGUE Modify [L02 91] Abscess       ED Disposition     ED Disposition Condition Comment    Admit  Case was discussed with Thaddeus Zuñiga and the patient's admission status was agreed to be Admission Status: inpatient status to the service of Dr Thaddeus Zuñiga   Follow-up Information    None       Current Discharge Medication List      CONTINUE these medications which have NOT CHANGED    Details   acetaminophen (TYLENOL) 325 mg tablet Take 2 tablets by mouth every 6 (six) hours as needed for fever  Qty: 30 tablet, Refills: 0      alendronate (FOSAMAX) 70 mg tablet Take by mouth      atorvastatin (LIPITOR) 20 mg tablet TAKE 1 TABLET AT BEDTIME  Qty: 90 tablet, Refills: 1    Associated Diagnoses: Mixed hyperlipidemia      !!  Cholecalciferol (VITAMIN D) 2000 units CAPS Take 1 capsule by mouth daily Coenzyme Q10 (CO Q 10) 100 MG CAPS Take 1 capsule by mouth 2 (two) times a day      !! CVS D3 1000 units capsule TAKE 1 CPASULE DAILY    Refills: 0      !! cyanocobalamin (CVS VITAMIN B-12) 1000 MCG tablet Take 1 tablet by mouth daily      !! cyanocobalamin (VITAMIN B-12) 1,000 mcg tablet Take 1 tablet by mouth daily      !! docusate sodium (COLACE) 100 mg capsule Take 1 capsule by mouth 2 (two) times a day  Qty: 10 capsule, Refills: 0      !! docusate sodium (COLACE) 100 mg capsule Take by mouth      furosemide (LASIX) 20 mg tablet Take 1 tablet by mouth daily      !! losartan (COZAAR) 100 MG tablet Take 100 mg by mouth daily      !! losartan (COZAAR) 100 MG tablet Take 1 tablet by mouth daily      Magnesium Oxide 400 MG CAPS Take 1 capsule by mouth daily      !! metoprolol tartrate (LOPRESSOR) 25 mg tablet Take 25 mg by mouth every 12 (twelve) hours      !! metoprolol tartrate (LOPRESSOR) 25 mg tablet Take 1 tablet by mouth 2 (two) times a day      Multiple Vitamins-Minerals (ZINC PO) Take by mouth      multivitamin (THERAGRAN) TABS Take 1 tablet by mouth daily  Refills: 0      Naproxen Sodium (ALEVE) 220 MG CAPS Take 1 capsule by mouth 2 (two) times a day      Omega-3 Fatty Acids (OMEGA-3 FISH OIL) 1000 MG CAPS Take 1 capsule by mouth daily      polyvinyl alcohol (LIQUIFILM TEARS) 1 4 % ophthalmic solution Administer 1 drop to both eyes every 3 (three) hours as needed for dry eyes  Qty: 15 mL, Refills: 0      potassium chloride (K-DUR,KLOR-CON) 20 mEq tablet Take by mouth      potassium chloride (MICRO-K) 10 MEQ CR capsule Take 1 capsule by mouth daily      QUEtiapine (SEROquel) 25 mg tablet Take 1 tablet (25 mg total) by mouth daily at bedtime  Qty: 90 tablet, Refills: 0    Associated Diagnoses: Late onset Alzheimer's disease without behavioral disturbance      SANTYL ointment APPLY TO WOUND DAILY FOR 2 WEEKS, CLEAN WOUND BEFORE APPLY, USE NORMAL SALINE  Refills: 0      traMADol (ULTRAM) 50 mg tablet Take by mouth       !! - Potential duplicate medications found  Please discuss with provider  No discharge procedures on file      ED Provider  Electronically Signed by           Cali Crandall PA-C  04/09/18 9320

## 2018-04-09 LAB
ANION GAP SERPL CALCULATED.3IONS-SCNC: 7 MMOL/L (ref 4–13)
BUN SERPL-MCNC: 9 MG/DL (ref 5–25)
CALCIUM SERPL-MCNC: 10.1 MG/DL (ref 8.3–10.1)
CHLORIDE SERPL-SCNC: 106 MMOL/L (ref 100–108)
CO2 SERPL-SCNC: 26 MMOL/L (ref 21–32)
CREAT SERPL-MCNC: 0.7 MG/DL (ref 0.6–1.3)
ERYTHROCYTE [DISTWIDTH] IN BLOOD BY AUTOMATED COUNT: 13.6 % (ref 11.6–15.1)
GFR SERPL CREATININE-BSD FRML MDRD: 93 ML/MIN/1.73SQ M
GLUCOSE SERPL-MCNC: 88 MG/DL (ref 65–140)
HCT VFR BLD AUTO: 31.6 % (ref 34.8–46.1)
HGB BLD-MCNC: 9.8 G/DL (ref 11.5–15.4)
MCH RBC QN AUTO: 25.8 PG (ref 26.8–34.3)
MCHC RBC AUTO-ENTMCNC: 31 G/DL (ref 31.4–37.4)
MCV RBC AUTO: 83 FL (ref 82–98)
PLATELET # BLD AUTO: 337 THOUSANDS/UL (ref 149–390)
PMV BLD AUTO: 9.9 FL (ref 8.9–12.7)
POTASSIUM SERPL-SCNC: 3.5 MMOL/L (ref 3.5–5.3)
RBC # BLD AUTO: 3.8 MILLION/UL (ref 3.81–5.12)
SODIUM SERPL-SCNC: 139 MMOL/L (ref 136–145)
WBC # BLD AUTO: 9.42 THOUSAND/UL (ref 4.31–10.16)

## 2018-04-09 PROCEDURE — G8979 MOBILITY GOAL STATUS: HCPCS

## 2018-04-09 PROCEDURE — 85027 COMPLETE CBC AUTOMATED: CPT | Performed by: INTERNAL MEDICINE

## 2018-04-09 PROCEDURE — 97163 PT EVAL HIGH COMPLEX 45 MIN: CPT

## 2018-04-09 PROCEDURE — 99232 SBSQ HOSP IP/OBS MODERATE 35: CPT | Performed by: PHYSICIAN ASSISTANT

## 2018-04-09 PROCEDURE — 99223 1ST HOSP IP/OBS HIGH 75: CPT | Performed by: INTERNAL MEDICINE

## 2018-04-09 PROCEDURE — G8978 MOBILITY CURRENT STATUS: HCPCS

## 2018-04-09 PROCEDURE — 97530 THERAPEUTIC ACTIVITIES: CPT

## 2018-04-09 PROCEDURE — 80048 BASIC METABOLIC PNL TOTAL CA: CPT | Performed by: INTERNAL MEDICINE

## 2018-04-09 RX ADMIN — VITAMIN D, TAB 1000IU (100/BT) 2000 UNITS: 25 TAB at 08:27

## 2018-04-09 RX ADMIN — CEFAZOLIN SODIUM 1000 MG: 1 SOLUTION INTRAVENOUS at 22:11

## 2018-04-09 RX ADMIN — Medication 400 MG: at 21:25

## 2018-04-09 RX ADMIN — ENOXAPARIN SODIUM 30 MG: 30 INJECTION SUBCUTANEOUS at 08:27

## 2018-04-09 RX ADMIN — METRONIDAZOLE 500 MG: 500 SOLUTION INTRAVENOUS at 10:23

## 2018-04-09 RX ADMIN — METRONIDAZOLE 500 MG: 500 SOLUTION INTRAVENOUS at 02:34

## 2018-04-09 RX ADMIN — CEFEPIME HYDROCHLORIDE 1000 MG: 1 INJECTION, SOLUTION INTRAVENOUS at 06:19

## 2018-04-09 RX ADMIN — ATORVASTATIN CALCIUM 20 MG: 20 TABLET, FILM COATED ORAL at 21:25

## 2018-04-09 RX ADMIN — CEFAZOLIN SODIUM 1000 MG: 1 SOLUTION INTRAVENOUS at 16:34

## 2018-04-09 NOTE — PLAN OF CARE
Problem: PHYSICAL THERAPY ADULT  Goal: Performs mobility at highest level of function for planned discharge setting  See evaluation for individualized goals  Treatment/Interventions: Functional transfer training, LE strengthening/ROM, Elevations, Therapeutic exercise, Endurance training, Cognitive reorientation, Patient/family training, Equipment eval/education, Bed mobility, Gait training, Spoke to nursing, Spoke to case management  Equipment Recommended: Luiza Nuñez       See flowsheet documentation for full assessment, interventions and recommendations  Prognosis: Fair  Problem List: Decreased strength, Decreased endurance, Impaired balance, Decreased mobility, Decreased coordination, Decreased cognition, Impaired judgement, Decreased safety awareness, Impaired hearing  Assessment: Pt is a 80 y o  female seen for PT evaluation s/p admit to [address] [place] [address] on 4/8/2018 w/ Sepsis ([facility] [address] )  Order placed for PT  Prior to admission, pt was independent w/ all functional mobility w/ walker most recently (but had cane in past), lived in one floor environment, had 2 ADRIEL with railing and lived alone,but family took turns A her  Upon evaluation: Pt requires S assistance for bed mobility and transfers and intermittent min assistance for ambulation with rolling walker  Pt's clinical presentation is currently unstable/unpredictable given the functional mobility deficits above, especially weakness, decreased endurance, gait deviations and decreased functional mobility tolerance, coupled with fall risks including hx of falls, impaired balance, impaired coordination and decreased cognition, and combined with medical complications of abnormal H&H  Pt to benefit from continued skilled PT tx while in hospital and upon DC to address deficits as defined above and maximize level of functional mobility   From PT/mobility standpoint, recommendation at time of d/c would be Home PT, with rolling walker and intermittent family assistance from a mobility standpoint pending progress in order to maximize pt's functional independence and consistency w/ mobility in order to facilitate return to PLOF  Recommend trial with walker next 1-2 sessions, trial with cane next 1-2 sessions, ther ex next 1-2 sessions and OT consult  Barriers to Discharge: Decreased caregiver support  Barriers to Discharge Comments: Per visitor, pt splits time with family members, and had home health nursing, PT/speech prior to admission  Recommendation: Home with family support, Home PT          See flowsheet documentation for full assessment

## 2018-04-09 NOTE — CONSULTS
Consultation - Infectious Disease   Jennie Cunha 80 y o  female MRN: [id number]  Unit/Bed#: -01 Encounter: [id number]    IMPRESSION & RECOMMENDATIONS:   1  Abscess  Secondary to chronic sacral decubitus ulcer  Moderate purulent drainage  Patient is afebrile with leukocytosis  Blood cultures are pending  Patient is clinically stable with minimal pain  She is tolerating the antibiotics without difficulty    -stop IV cefepime   -stop IV flagyl   -start IV Cefazolin, 1gm q8 hours   -check CBCD tomorrow   -follow up blood cultures   -frequent turning and repositioning    2  Sacral decubitus ulcer  Chronic in nature  Now with 2 subcutaneous fluid collections posterior to the coccyx which are compatible with abscesses  -serial wound exams   -frequent turning and repositioning   -follow up surgery   -follow up wound care    3  Leukocytosis  Likely secondary to 1  WBC improvement today from 11 39 to 9 42  Patient is afebrile  Blood cultures are pending    -antibiotics as above  -check CBCD tomorrow  -follow up blood cultures  -no additional ID work up at this time    4  Dementia    -monitor for delirium   -serial exams    HISTORY OF PRESENT ILLNESS:  Reason for Consult: Abscess, Decubitus ulcer of the sacral region, stage 2  HPI: Bari Sandoval is a 80y o  year old female  She was brought to the [facility] Emergency Department on 04/8/18 because her daughter was concerned that her sacral wound may be infected  The patient has had an open wound on her sacrum since December 5, 2017  The wound started while in a nursing facility  The patient has been sent home and the wound has become better with thorough care from her daughter and visiting nurse  However, when checked by the visiting nurse earlier in the day, there was concern about foul smelling drainage and it was recommended she come to the emergency department for assessment      Upon arrival to the ED the patient's temperature was 99 1° with a heart rate of 104  Patient's white blood cell count was 11 39, creatinine 0 68 with a GFR of 94  The wound assessment revealed foul smelling purulent drainage and an area of induration on the right portion of the wound  Blood cultures were collected  The patient was sent for a CT scan of the abdomen and pelvis which revealed small abscess at the site of the wound  Patient was started on cefepime and Flagyl  She was admitted to the floor for additional medical management  Since being seen on the floor the patient was assessed by surgery  Wound Care has been consulted  Infectious Disease was asked for input on antibiotics  Of significance the patient has a past medical and surgical history of a ruptured cerebellar artery, psychosis, edema, right neck mass, hypertension, hyperlipidemia, dementia, gallbladder disease, depression, left leg cellulitis, anxiety, heat hep C, hysterectomy, cholecystectomy, repair of cerebral aneurysm, and appendectomy  REVIEW OF SYSTEMS:  Difficult to obtain as patient has dementia  She is oriented only to self  She denies pain at this time but states overnight it was hard to sleep because the wound was hurting her at that time  She denies nausea and reports she is hungry  She denies coughing and states it is not difficult to take a deep breath      PAST MEDICAL HISTORY:  Past Medical History:   Diagnosis Date    Acute hepatitis C 12/30/2012    Anxiety     Cellulitis of left leg 09/03/2014    Depression     Gallbladder disease 08/28/2012    HLD (hyperlipidemia) 11/24/2017    HTN (hypertension), benign 11/24/2017    Mass of right side of neck 09/08/2016    Peripheral edema 11/24/2017    Psychiatric illness     Psychosis     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 ([facility] [address] ) 01/01/1989    S/P clipping     Past Surgical History:   Procedure Laterality Date    APPENDECTOMY      CEREBRAL ANEURYSM REPAIR      CHOLECYSTECTOMY      HYSTERECTOMY FAMILY HISTORY:  Non-contributory    SOCIAL HISTORY:  Social History   History   Alcohol Use No     History   Drug Use No     History   Smoking Status    Former Smoker   Smokeless Tobacco    Never Used       ALLERGIES:  Allergies   Allergen Reactions    Amoxicillin     Dilantin [Phenytoin]     Erythromycin      MEDICATIONS:  All current active medications have been reviewed  ANTIBIOTICS:  Cefepime 2  Flagyl 2  Antibiotics 2    PHYSICAL EXAM:  HR:  [] 88  Resp:  [16-18] 18  BP: ()/(50-65) 105/54  SpO2:  [92 %-99 %] 92 %  Temp (24hrs), Av 5 °F (37 5 °C), Min:99 °F (37 2 °C), Max:100 °F (37 8 °C)  Current: Temperature: 99 °F (37 2 °C)    Intake/Output Summary (Last 24 hours) at 18 1258  Last data filed at 18 1113   Gross per 24 hour   Intake              620 ml   Output              600 ml   Net               20 ml       General Appearance:  Appearing well, nontoxic, and in no distress   Head:  Normocephalic, without obvious abnormality, atraumatic   Eyes:  Conjunctiva pink and sclera anicteric, both eyes   Nose: Nares normal, mucosa normal, no drainage   Throat: Oropharynx moist without lesions   Neck: Supple, symmetrical, no adenopathy, no tenderness/mass/nodules   Lungs:   Clear to auscultation bilaterally, respirations unlabored   Heart:  RRR; no murmur, rub or gallop   Abdomen:   Soft, non-tender, non-distended, positive bowel sounds    Extremities: No cyanosis or clubbing, +1 edema both feet   Skin: No rashes or lesions  Sacral decubitus with purulent drainage   Lymph nodes: Cervical, supraclavicular nodes normal   Neurologic: Alert , oriented to person only, extremity strength 5/5 and symmetric     LABS, IMAGING, & OTHER STUDIES:  Lab Results:  I have personally reviewed pertinent labs      Results from last 7 days  Lab Units 18  0502 18  1504   WBC Thousand/uL 9 42 11 39*   HEMOGLOBIN g/dL 9 8* 10 9*   PLATELETS Thousands/uL 337 379       Results from last 7 days  Lab Units 04/09/18  0502 04/08/18  1504   SODIUM mmol/L 139 138   POTASSIUM mmol/L 3 5 4 5   CHLORIDE mmol/L 106 104   CO2 mmol/L 26 28   ANION GAP mmol/L 7 6   BUN mg/dL 9 10   CREATININE mg/dL 0 70 0 68   EGFR ml/min/1 73sq m 93 94   GLUCOSE RANDOM mg/dL 88 92   CALCIUM mg/dL 10 1 10 5*     Imaging Studies:   I have personally reviewed pertinent imaging study reports and images in PACS  CT ABDOMEN PELVIS 4/8/18  FINDINGS:  ABDOMEN  LOWER CHEST:  No clinically significant abnormality identified in the visualized lower chest   LIVER/BILIARY TREE:  Unremarkable  GALLBLADDER: Padmaja Leija is surgically absent  SPLEEN:  Unremarkable  PANCREAS:  Unremarkable  ADRENAL GLANDS:  Unremarkable  KIDNEYS/URETERS:  Unremarkable  No hydronephrosis  STOMACH AND BOWEL: Hannah Paddock is colonic diverticulosis without evidence of acute diverticulitis  APPENDIX:  No findings to suggest appendicitis  ABDOMINOPELVIC CAVITY:  No ascites or free intraperitoneal air  No lymphadenopathy  VESSELS:  More significant atherosclerotic vascular calcifications are noted than would be expected for the patient's age  Glorianne Query evidence of aortic aneurysm  PELVIS  REPRODUCTIVE ORGANS:  Patient is status post hysterectomy  URINARY BLADDER:  Unremarkable  ABDOMINAL WALL/INGUINAL REGIONS: Hannah Paddock are 2 subcutaneous fluid collections posterior to the coccyx, measuring 3 3 x 2 7 cm at the right paramedian aspect on image 2/58, and a 2nd collection slightly more inferiorly in the midline measuring 4 2 x 3 8   cm   These collections might communicate with one another, and are compatible with abscesses  OSSEOUS STRUCTURES:  No acute fracture or destructive osseous lesion  Impression:     Decubitus subcutaneous ulcer(s) posterior to the coccyx  Colonic diverticulosis without diverticulitis       Other Studies:   Blood cultures pending

## 2018-04-09 NOTE — CASE MANAGEMENT
Initial Clinical Review    Admission: Date/Time/Statement: 4/8/18 @ 1757     Orders Placed This Encounter   Procedures    Inpatient Admission (expected length of stay for this patient is greater than two midnights)     Standing Status:   Standing     Number of Occurrences:   1     Order Specific Question:   Admitting Physician     Answer:   Terrell Meneses     Order Specific Question:   Level of Care     Answer:   Med Surg [16]     Order Specific Question:   Estimated length of stay     Answer:   More than 2 Midnights     Order Specific Question:   Certification     Answer:   I certify that inpatient services are medically necessary for this patient for a duration of greater than two midnights  See H&P and MD Progress Notes for additional information about the patient's course of treatment  ED: Date/Time/Mode of Arrival:   ED Arrival Information     Expected Arrival Acuity Means of Arrival Escorted By Service Admission Type    - 4/8/2018 11:20 Urgent Walk-In Family Member General Medicine Urgent    Arrival Complaint    abscess          Chief Complaint:   Chief Complaint   Patient presents with    Abscess     pt has had a bed sore since december 5th  pt is seeing vna and they were concerned about an abscess to the area since she has had a fever since friday of 100       History of Illness: 80 y o  female who presents with worsening decubitus ulcer and drainage from the sacral ulcer since last for 5 days  History is obtained from the daughter at bedside as well as the ED physician  Patient has history of dementia and is a poor historian  Daughter reports 4-5 days history of worsening decubitus ulcer as well as purulent discharge and foul smell  The visiting nurses noticed increased purulence a mild degree of fever was also reported, per the daughter patient is lethargic sleeping more than usual poor appetite and generalized weakness with not doing well      ED Vital Signs:   ED Triage Vitals Temperature Pulse Respirations Blood Pressure SpO2   04/08/18 1202 04/08/18 1202 04/08/18 1202 04/08/18 1202 04/08/18 1202   99 1 °F (37 3 °C) 104 18 120/58 97 %      Temp Source Heart Rate Source Patient Position - Orthostatic VS BP Location FiO2 (%)   04/08/18 1202 04/08/18 1202 04/08/18 1441 04/08/18 1202 --   Oral Monitor Lying Left arm       Pain Score       04/08/18 1202       8        Wt Readings from Last 1 Encounters:   02/13/18 53 5 kg (118 lb)       Vital Signs (abnormal):  Maximum temperature 100  Exam - Features of severe protein calorie malnutrition noted  Lungs diminished breath sounds at bases  Heart sounds S1-S2 noted tachycardia noted  Sacral decubitus ulcer with abscess pointing and purulent discharge noted  Pedal edema noted    Abnormal Labs/Diagnostic Test Results:   Calcium 10 5  Wbc 11 39, hgb 10 9, hct 34 4  Ct abdomen - Decubitus subcutaneous ulcer(s) posterior to the coccyx  Colonic diverticulosis without diverticulitis  Labs 4/9-  Wbc 9 42   hgb 9 8, hct 31 6  ED Treatment: blood cultures  Medication Administration from 04/08/2018 1120 to 04/08/2018 2055       Date/Time Order Dose Route Action Action by Comments     04/08/2018 1603 iohexol (OMNIPAQUE) 350 MG/ML injection (MULTI-DOSE) 100 mL 100 mL Intravenous Given Kriste Fragmin      04/08/2018 2034 sodium chloride 0 9 % bolus 500 mL 0 mL Intravenous Stopped Lisa De La Cruz RN      04/08/2018 1800 sodium chloride 0 9 % bolus 500 mL 500 mL Intravenous [address] Yazmin De Anda RN      04/08/2018 2048 cefepime (MAXIPIME) IVPB (premix) 1,000 mg 1,000 mg Intravenous New Bag Mic Yazmin De Anda RN           Past Medical/Surgical History:    Active Ambulatory Problems     Diagnosis Date Noted    Psychosis 03/03/2017    Acute encephalopathy 11/24/2017    Hypercalcemia 11/24/2017    Leukocytosis 11/24/2017    HTN (hypertension), benign 11/24/2017    Peripheral edema 11/24/2017    HLD (hyperlipidemia) 11/24/2017    Hyperlipidemia     Hypokalemia 11/26/2017    Alzheimer's disease 11/26/2017    Toenail deformity 11/26/2017    [facility] 12[facility]2017    Auditory hallucinations 02/02/2017    Constipation 12/23/2016    Edema 08/29/2012    Glaucoma 08/28/2012    Hallucinations, visual 02/02/2017    Hypomagnesemia 12/23/2016    Vitamin B12 deficiency 06/28/2017    Vitamin D deficiency 07/20/2015    Osteoporosis 10/21/2016    Skin ulcer of sacrum with fat layer exposed ([facility] ) 02/13/2018    Physical deconditioning 12/20/2017    Anemia 05/18/2016     Resolved Ambulatory Problems     Diagnosis Date Noted    No Resolved Ambulatory Problems     Past Medical History:   Diagnosis Date    Acute hepatitis C 12/30/2012    Anxiety     Cellulitis of left leg 09/03/2014    Depression     Gallbladder disease 08/28/2012    HLD (hyperlipidemia) 11/24/2017    HTN (hypertension), benign 11/24/2017    Mass of right side of neck 09/08/2016    Peripheral edema 11/24/2017    Psychiatric illness     Psychosis     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 ([facility] ) 01/01/1989       Admitting Diagnosis: Abscess [L02 91]  Decubitus ulcer of sacral region, stage 2 [L89 152]    Age/Sex: 80 y o  female  Assessment/Plan: Sepsis given fever leukocytosis will check lactic acid, check blood cultures  · Sacral Decubitus ulcer with abscess will provide her with IV cefepime metronidazole, infectious Disease, surgery consultation, wound care, frequent position change     Plan for Additional Problems:    Severe protein calorie malnutrition encourage p o  intake  · Dementia monitor for delirium  · Hypercalcemia monitor  · Hypertension  Out of bed as able    Admission Orders: 4/8/2018  1757 INPATIENT   Scheduled Meds:   Current Facility-Administered Medications:  acetaminophen 650 mg Oral Q6H PRN Luis Dennis MD    aluminum-magnesium hydroxide-simethicone 15 mL Oral Q6H PRN Luis Dennis MD    atorvastatin 20 mg Oral HS Natanael Valencia MD    cefepime 1,000 mg Intravenous Q12H Natanael Valencia MD Last Rate: 1,000 mg (04/09/18 5604)   cholecalciferol 2,000 Units Oral Daily Natanael Valencia MD    enoxaparin 30 mg Subcutaneous Daily Natanael Valencia MD    magnesium oxide 400 mg Oral HS Cleopatra Arriaza PA-C    metroNIDAZOLE 500 mg Intravenous Q8H Natanael Valencia MD Last Rate: 500 mg (04/09/18 1023)   ondansetron 4 mg Intravenous Q6H PRN Natanael Valencia MD    polyvinyl alcohol 1 drop Both Eyes Q3H PRN Natanael Valencia MD    sodium chloride 50 mL/hr Intravenous Continuous Natanael Valencia MD Last Rate: 50 mL/hr (04/08/18 2327)   traMADol 50 mg Oral Q6H PRN Natanael Valencia MD      Continuous Infusions:   sodium chloride 50 mL/hr Last Rate: 50 mL/hr (04/08/18 2327)     PRN Meds:   acetaminophen    aluminum-magnesium hydroxide-simethicone    ondansetron    polyvinyl alcohol    traMADol    OTHER ORDERS:  Consult surgery; ID; wound care  Wound care daily - flush with saline  scds  PT    Per surgery - 2x2cm granular opening over lower sacrum  Probed and tracks~3cm superiorly  Small amount of mucoid drainage  Appears to be chronic     Local care  Frequent turning  Protein supplements  Will monitor      Thank you,  [facility] in the [facility] by Nima Chin for 2017  Network Utilization Review Department  Phone: [phone]; Fax [phone]  ATTENTION: The Network Utilization Review Department is now centralized for our 7 Facilities  Make a note that we have a new phone and fax numbers for our Department  Please call with any questions or concerns to [phone] and carefully follow the prompts so that you are directed to the right person  All voicemails are confidential  Fax any determinations, approvals, denials, and requests for initial or continue stay review clinical to [phone]   Due to HIGH CALL volume, it would be easier if you could please send faxed requests to expedite your requests and in part, help us provide discharge notifications faster

## 2018-04-09 NOTE — CONSULTS
Pt meets criteria for chronic, severe protein calorie malnutrition as documented in 4/9/2018 nutrition note (intake meeting <75% estimated energy needs for > 1 month, severe depletion of muscle mass noted at clavicle)  Please obtain updated weight as able  Last weight documented at 2/13/2018 encounter was 118#, revealing a 34# weight loss (22%) over 11 months  Discussed protein food sources w/ pt and encouraged increased intake at meals  Offered Ensure supplements, but pt not agreeable at this time  Willing to try [facility] BID to optimize wound healing

## 2018-04-09 NOTE — ASSESSMENT & PLAN NOTE
Malnutrition Findings:       muscle wasting    BMI Findings:   BMI 19 34    Encourage oral intake and add supplements

## 2018-04-09 NOTE — PHYSICAL THERAPY NOTE
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 ([facility] [address] ) 01/01/1989    S/P clipping     Length Of Stay: 1  Performed at least 2 patient identifiers during session: Name and Birthday    PHYSICAL THERAPY EVALUATION :    04/09/18 5314   Note Type   Note type Eval/Treat   Pain Assessment   Pain Assessment No/denies pain   Home Living   Type of [address] Two level;Stairs to enter with rails  (2STE)   Home Equipment Cane;Walker  (Pt has most recently been using rolling walker)   Prior Function   Level of [place] Independent with ADLs and functional mobility; Needs assistance with IADLs   Lives With Alone; Family   Falls in the last 6 months 0   Restrictions/Precautions   Weight Bearing Precautions Per Order No   General   Family/Caregiver Present Yes   Cognition   Overall Cognitive Status Impaired   Arousal/Participation Cooperative   Memory Decreased recall of precautions;Decreased recall of recent events;Decreased short term memory   Following Commands Follows one step commands inconsistently   RUE Strength   RUE Overall Strength Within Functional Limits - able to perform ADL tasks with strength   LUE Strength   LUE Overall Strength Within Functional Limits - able to perform ADL tasks with strength   Strength RLE   R Hip Flexion 4/5   R Knee Extension 4-/5   R Ankle Dorsiflexion 4/5   Strength LLE   L Hip Flexion 4/5   L Knee Extension 4-/5   L Ankle Dorsiflexion 4/5   Coordination   Movements are Fluid and Coordinated 0   Coordination and Movement Description bradykinetic   Sensation [facility] vision; hearing limited)   Light Touch   RLE Light Touch Grossly intact   LLE Light Touch Grossly intact   Transfers   Sit to Stand 5  Supervision   Additional items Assist x 1; Increased time required;Verbal cues;Armrests  (hand placement)   Stand to Sit 5  Supervision   Additional items Assist x 1; Increased time required;Verbal cues;Armrests  (hand placement)   Ambulation/Elevation   Gait pattern Excessively slow  (takes more than reasonable time around obstacles  )   Gait Assistance 4  Minimal assist  (for 5% of gait trial with obstacle negotiation, otherwise S )   Additional items Assist x 1;Verbal cues   Assistive Device Rolling walker   Distance 240'   Stair Management Assistance Not tested   Balance   Static Sitting Good   Static Standing Fair -   Ambulatory Poor +   Endurance Deficit   Endurance Deficit Yes   Endurance Deficit Description limited amb distance and standing tolerance   Activity Tolerance   Activity Tolerance Patient limited by fatigue   Medical Staff Made Aware spoke to Mani Mike 85 from case management   Nurse Made Aware spoke to Norah HEMPHILL   Assessment   Prognosis Fair   Problem List Decreased strength;Decreased endurance; Impaired balance;Decreased mobility; Decreased coordination;Decreased cognition; Impaired judgement;Decreased safety awareness; Impaired hearing   Barriers to Discharge Decreased caregiver support   Barriers to Discharge Comments Per visitor, pt splits time with family members, and had home health nursing, PT/speech prior to admission   Goals   Patient Goals to go home   STG Expiration Date 04/19/18   Short Term Goal #1 Pt will perform transfers modified I, amb w/least restrictive device for 450' w/o uncorrected losses of balance, perform 2 steps w/S      Treatment Day 1  (treatment documented in progress note)   Plan   Treatment/Interventions Functional transfer training;LE strengthening/ROM; Elevations; Therapeutic exercise; Endurance training;Cognitive reorientation;Patient/family training;Equipment eval/education; Bed mobility;Gait training;Spoke to nursing;Spoke to case management   PT Frequency 5x/wk   Recommendation   Recommendation Home with family support;Home PT   Equipment Recommended Walker;Cane   Barthel Index   Feeding 5   Bathing 0   Grooming Score 0   Dressing Score 5   Bladder Score 5   Bowels Score 10   Toilet Use Score 5   Transfers (Bed/Chair) Score 10   Mobility (Level Surface) Score 10   Stairs Score 0   Barthel Index Score 50   (Please find full objective findings from PT assessment regarding body systems outlined above)  Assessment: Pt is a 80 y o  female seen for PT evaluation s/p admit to [facility] on 4/8/2018 w/ Sepsis ([address] )  Order placed for PT  Prior to admission, pt was independent w/ all functional mobility w/ walker most recently (but had cane in past), lived in one floor environment, had 2 ADRIEL with railing and lived alone,but family took turns A her  Upon evaluation: Pt requires S assistance for bed mobility and transfers and intermittent min assistance for ambulation with rolling walker  Pt's clinical presentation is currently unstable/unpredictable given the functional mobility deficits above, especially weakness, decreased endurance, gait deviations and decreased functional mobility tolerance, coupled with fall risks including hx of falls, impaired balance, impaired coordination and decreased cognition, and combined with medical complications of abnormal H&H  Pt to benefit from continued skilled PT tx while in hospital and upon DC to address deficits as defined above and maximize level of functional mobility   From PT/mobility standpoint, recommendation at time of d/c would be Home PT, with rolling walker and intermittent family assistance from a mobility standpoint pending progress in order to maximize pt's functional independence and consistency w/ mobility in order to facilitate return to PLOF  Recommend trial with walker next 1-2 sessions, trial with cane next 1-2 sessions, ther ex next 1-2 sessions and OT consult  The following objective measures were performed on IE: Barthel Index 50/100  Comorbidities affecting pt's physical performance at time of assessment include: HTN and ruptured anewurism of PICA s/p clipping, psychiatric illness  Personal factors affecting pt at time of IE include: anxiety, depression, steps to enter environment, past experience, behavioral pattern and inability to perform IADLs  Gianfranco Overton PT, DPT      PHYSICAL THERAPY TREATMENT NOTE  Time In: 15:52  Time DBK:25:28   Total Time: 10min  S:  Pt agreeable to perform additional mobility tasks  O:  Pt performed transfers S @ commode , but needs verbal instruction for hand placement and technique  Amb w/ rolling walker for 20' S  Standing tolerance with 1-2 UE support for 4 min while performing dual task  A:  Pt requires no physical assistance to perform mobility, and requires 25-50% verbal instruction or tactile feedback to perform transfers and gait with proper form and technique  P:  Recommend addition of therapeutic exercises to current functional mobility program, especially balance activities, as well as stairs       Elizabeth Chavez PT, DPT

## 2018-04-09 NOTE — CONSULTS
Patient is being followed up acute care surgery for pressure injury  Wound care orders placed by surgical team  Wound care will sign off, please re-consult if needed

## 2018-04-09 NOTE — ASSESSMENT & PLAN NOTE
· Spontaneously drained  · Local wound care per surgery  · ID consult appreciated    · Continue IV antibiotics

## 2018-04-09 NOTE — CONSULTS
Consultation - General Surgery   Jennie Yumiko Brigida 80 y o  female MRN: [id number]  Unit/Bed#: -01 Encounter: [id number]    Assessment/Plan     Assessment:  Patient is an 51-year-old female with a decubitus ulcer with purulent drainage     Plan:  Wound is spontaneously draining and doesn't need to be opened or debrided  Sitz baths  Consult wound care for long term management  Frequent repositioning and offloading  IV abx, ID consult pending  Primary care per SLIM    History of Present Illness     HPI:  Jennifer Em is a 80 y o  female who presents with past medical history of Alzheimer's, hypertension who presents her drainage of a sacral ulcer in fevers  Per chart review was has been worsening for the last five days, has been foul smelling and had purulent and discharge  She has visiting nurses managed with wound which is been present since December  Per the patient's family and she has been more tired and had a low weaker appetite  Ulcer is generally healthy in appearance except for a posterior/right area of induration  about [id number] which is spontaneously draining purulent material from a small opening  Inpatient consult to Acute Care Surgery     Date/Time 4/9/2018 6:25 AM     Performed by  Mady Rosas     Authorized by Francisco Javier Sainz              Review of Systems   Constitutional: Positive for activity change, appetite change, fatigue and fever  HENT: Negative  Eyes: Negative  Respiratory: Negative  Cardiovascular: Negative  Gastrointestinal: Negative  Endocrine: Negative  Genitourinary: Negative  Musculoskeletal: Negative  Skin: Positive for wound  Neurological: Positive for weakness  Hematological: Negative  Psychiatric/Behavioral: Negative          Historical Information   Past Medical History:   Diagnosis Date    Acute hepatitis C 12/30/2012    Anxiety     Cellulitis of left leg 09/03/2014    Depression     Gallbladder disease 08/28/2012    HLD (hyperlipidemia) 11/24/2017    HTN (hypertension), benign 11/24/2017    Mass of right side of neck 09/08/2016    Peripheral edema 11/24/2017    Psychiatric illness     Psychosis     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    S/P clipping     Past Surgical History:   Procedure Laterality Date    APPENDECTOMY      CEREBRAL ANEURYSM REPAIR      CHOLECYSTECTOMY      HYSTERECTOMY       Social History   History   Alcohol Use No     History   Drug Use No     History   Smoking Status    Former Smoker   Smokeless Tobacco    Never Used     Family History: non-contributory    Meds/Allergies   all current active meds have been reviewed  Allergies   Allergen Reactions    Amoxicillin     Dilantin [Phenytoin]     Erythromycin        Objective   First Vitals:   Blood Pressure: 120/58 (04/08/18 1202)  Pulse: 104 (04/08/18 1202)  Temperature: 99 1 °F (37 3 °C) (04/08/18 1202)  Temp Source: Oral (04/08/18 1202)  Respirations: 18 (04/08/18 1202)  SpO2: 97 % (04/08/18 1202)    Current Vitals:   Blood Pressure: 100/50 (04/08/18 2300)  Pulse: 96 (04/08/18 2300)  Temperature: 99 5 °F (37 5 °C) (04/08/18 2300)  Temp Source: Oral (04/08/18 2300)  Respirations: 18 (04/08/18 2300)  SpO2: 92 % (04/08/18 2300)      Intake/Output Summary (Last 24 hours) at 04/09/18 0625  Last data filed at 04/08/18 2034   Gross per 24 hour   Intake              500 ml   Output                0 ml   Net              500 ml       Invasive Devices     Peripheral Intravenous Line            Peripheral IV 04/08/18 Right Antecubital 1 day                Physical Exam   Constitutional: She is oriented to person, place, and time  She appears well-developed  malnourished    HENT:   Head: Normocephalic and atraumatic  Eyes: Pupils are equal, round, and reactive to light  Neck: Normal range of motion  Cardiovascular: Normal rate  Pulmonary/Chest: Effort normal  No respiratory distress  Abdominal: Soft   She exhibits no distension  There is no tenderness  Genitourinary:   Genitourinary Comments: About [id number] area of induration of postior right gluteal cleft of sacral ulcer  Spontaneously draining purulent material  See image below  Musculoskeletal: She exhibits no edema  Neurological: She is alert and oriented to person, place, and time  Skin: Skin is warm and dry  Psychiatric: She has a normal mood and affect  Lab Results:   I have personally reviewed pertinent lab results  , CBC:   Lab Results   Component Value Date    WBC 9 42 04/09/2018    HGB 9 8 (L) 04/09/2018    HCT 31 6 (L) 04/09/2018    MCV 83 04/09/2018     04/09/2018    MCH 25 8 (L) 04/09/2018    MCHC 31 0 (L) 04/09/2018    RDW 13 6 04/09/2018    MPV 9 9 04/09/2018   , CMP:   Lab Results   Component Value Date     04/09/2018    K 3 5 04/09/2018     04/09/2018    CO2 26 04/09/2018    ANIONGAP 7 04/09/2018    BUN 9 04/09/2018    CREATININE 0 70 04/09/2018    GLUCOSE 88 04/09/2018    CALCIUM 10 1 04/09/2018    EGFR 93 04/09/2018     Imaging: I have personally reviewed pertinent reports  EKG, Pathology, and Other Studies: I have personally reviewed pertinent reports

## 2018-04-09 NOTE — PROGRESS NOTES
Progress Note - Iftikhar Tracey 1935, 80 y o  female MRN: [id number]    Unit/Bed#: -01 Encounter: [id number]    Primary Care Provider: Melany Stein MD   Date and time admitted to hospital: 2018  1:52 PM        * Sepsis [facility])   Assessment & Plan    · POA with fever, leukocytosis, abscess        Abscess   Assessment & Plan    · Spontaneously drained  · Local wound care per surgery  · ID consult appreciated  · Continue IV antibiotics        Skin ulcer of sacrum with fat layer exposed ([address] )   Assessment & Plan    · Surgery input appreciated  · Continue local wound care and frequent repositioning        Hypercalcemia   Assessment & Plan    · Trending down  · monitor        HTN (hypertension), benign   Assessment & Plan    · stable        Alzheimer's disease   Assessment & Plan    · Supportive care        Protein-calorie malnutrition [facility])   Assessment & Plan    Malnutrition Findings:       muscle wasting    BMI Findings:   BMI 19 34    Encourage oral intake and add supplements               VTE Pharmacologic Prophylaxis:   Pharmacologic: Enoxaparin (Lovenox)  Mechanical VTE Prophylaxis in Place: Yes    Patient Centered Rounds: I have performed bedside rounds with nursing staff today  Discussions with Specialists or Other Care Team Provider: case management    Education and Discussions with Family / Patient: patient, daughter called with update    Time Spent for Care: 30 minutes  More than 50% of total time spent on counseling and coordination of care as described above  Current Length of Stay: 1 day(s)    Current Patient Status: Inpatient   Certification Statement: The patient will continue to require additional inpatient hospital stay due to IV antibiotics    Discharge Plan: Await PT eval    Code Status: Level 1 - Full Code      Subjective:   Poor historian secondary to dementia  No events overnight      Objective:     Vitals:   Temp (24hrs), Av 5 °F (37 5 °C), Min:99 °F (37 2 °C), Max:100 °F (37 8 °C)    HR:  [] 85  Resp:  [16-18] 18  BP: ()/(50-62) 111/56  SpO2:  [92 %-99 %] 98 %  There is no height or weight on file to calculate BMI  Input and Output Summary (last 24 hours): Intake/Output Summary (Last 24 hours) at 04/09/18 1601  Last data filed at 04/09/18 1453   Gross per 24 hour   Intake              800 ml   Output              600 ml   Net              200 ml       Physical Exam:     Physical Exam   Constitutional: She appears well-developed and well-nourished  No distress  HENT:   Head: Normocephalic and atraumatic  Cardiovascular: Normal rate and regular rhythm  Exam reveals no friction rub  No murmur heard  Pulmonary/Chest: Effort normal and breath sounds normal  No respiratory distress  She has no wheezes  Abdominal: Soft  Bowel sounds are normal  She exhibits no distension  There is no tenderness  There is no rebound and no guarding  Musculoskeletal: She exhibits no edema  Neurological: She is alert  No cranial nerve deficit  Skin: Skin is warm and dry  No rash noted  Sacral decub   Psychiatric: She has a normal mood and affect  Cognition and memory are impaired  Nursing note and vitals reviewed  Additional Data:     Labs:      Results from last 7 days  Lab Units 04/09/18  0502 04/08/18  1504   WBC Thousand/uL 9 42 11 39*   HEMOGLOBIN g/dL 9 8* 10 9*   HEMATOCRIT % 31 6* 34 4*   PLATELETS Thousands/uL 337 379   NEUTROS PCT %  --  73   LYMPHS PCT %  --  10*   MONOS PCT %  --  17*   EOS PCT %  --  0       Results from last 7 days  Lab Units 04/09/18  0502   SODIUM mmol/L 139   POTASSIUM mmol/L 3 5   CHLORIDE mmol/L 106   CO2 mmol/L 26   BUN mg/dL 9   CREATININE mg/dL 0 70   CALCIUM mg/dL 10 1   GLUCOSE RANDOM mg/dL 88           * I Have Reviewed All Lab Data Listed Above  * Additional Pertinent Lab Tests Reviewed:  All Labs For Current Hospital Admission Reviewed    Imaging:    Imaging Reports Reviewed Today Include: CT abd/pelvis  Imaging Personally Reviewed by Myself Includes:  none    Recent Cultures (last 7 days):           Last 24 Hours Medication List:     Current Facility-Administered Medications:  acetaminophen 650 mg Oral Q6H PRN Mena Barrera MD   aluminum-magnesium hydroxide-simethicone 15 mL Oral Q6H PRN Mena Barrera MD   atorvastatin 20 mg Oral HS Mena Barrera MD   cefazolin 1,000 mg Intravenous Q8H GERMÁN Vasquez   cholecalciferol 2,000 Units Oral Daily Mena Barrera MD   enoxaparin 30 mg Subcutaneous Daily Mena Barrera MD   magnesium oxide 400 mg Oral HS Allan Lugo PA-C   ondansetron 4 mg Intravenous Q6H PRN Mena Barrera MD   polyvinyl alcohol 1 drop Both Eyes Q3H PRN Mena Barrera MD   traMADol 50 mg Oral Q6H PRN Mena Barrera MD        Today, Patient Was Seen By: Brook Yanez PA-C    ** Please Note: Dictation voice to text software may have been used in the creation of this document   **

## 2018-04-09 NOTE — PLAN OF CARE
Problem: Potential for Falls  Goal: Patient will remain free of falls  INTERVENTIONS:  - Assess patient frequently for physical needs  -  Identify cognitive and physical deficits and behaviors that affect risk of falls  -  Dana fall precautions as indicated by assessment   - Educate patient/family on patient safety including physical limitations  - Instruct patient to call for assistance with activity based on assessment  - Modify environment to reduce risk of injury  - Consider OT/PT consult to assist with strengthening/mobility   Outcome: Progressing      Problem: Nutrition/Hydration-ADULT  Goal: Nutrient/Hydration intake appropriate for improving, restoring or maintaining nutritional needs  Monitor and assess patient's nutrition/hydration status for malnutrition (ex- brittle hair, bruises, dry skin, pale skin and conjunctiva, muscle wasting, smooth red tongue, and disorientation)  Collaborate with interdisciplinary team and initiate plan and interventions as ordered  Monitor patient's weight and dietary intake as ordered or per policy  Utilize nutrition screening tool and intervene per policy  Determine patient's food preferences and provide high-protein, high-caloric foods as appropriate       INTERVENTIONS:  - Monitor oral intake, urinary output, labs, and treatment plans  - Assess nutrition and hydration status and recommend course of action  - Evaluate amount of meals eaten  - Assist patient with eating if necessary   - Allow adequate time for meals  - Recommend/ encourage appropriate diets, oral nutritional supplements, and vitamin/mineral supplements  - Order, calculate, and assess calorie counts as needed  - Recommend, monitor, and adjust tube feedings and TPN/PPN based on assessed needs  - Assess need for intravenous fluids  - Provide specific nutrition/hydration education as appropriate  - Include patient/family/caregiver in decisions related to nutrition   Outcome: Progressing

## 2018-04-09 NOTE — PLAN OF CARE
Problem: DISCHARGE PLANNING - CARE MANAGEMENT  Goal: Discharge to post-acute care or home with appropriate resources  INTERVENTIONS:  - Conduct assessment to determine patient/family and health care team treatment goals, and need for post-acute services based on payer coverage, community resources, and patient preferences, and barriers to discharge  - Address psychosocial, clinical, and financial barriers to discharge as identified in assessment in conjunction with the patient/family and health care team  - Arrange appropriate level of post-acute services according to patients   needs and preference and payer coverage in collaboration with the physician and health care team  - Communicate with and update the patient/family, physician, and health care team regarding progress on the discharge plan  - Arrange appropriate transportation to post-acute venues  Outcome: Progressing  Patient will need additional days due to IV abx  Patient was at rehab previously and was taken home with [facility]   Cm department will continue to follow Pt through queta brock

## 2018-04-10 LAB
ANION GAP SERPL CALCULATED.3IONS-SCNC: 8 MMOL/L (ref 4–13)
BASOPHILS # BLD AUTO: 0.02 THOUSANDS/ΜL (ref 0–0.1)
BASOPHILS NFR BLD AUTO: 0 % (ref 0–1)
BUN SERPL-MCNC: 10 MG/DL (ref 5–25)
CALCIUM SERPL-MCNC: 9.6 MG/DL
CHLORIDE SERPL-SCNC: 105 MMOL/L (ref 100–108)
CO2 SERPL-SCNC: 27 MMOL/L (ref 21–32)
CREAT SERPL-MCNC: 0.67 MG/DL (ref 0.6–1.3)
EOSINOPHIL # BLD AUTO: 0 THOUSAND/ΜL (ref 0–0.61)
EOSINOPHIL NFR BLD AUTO: 0 % (ref 0–6)
ERYTHROCYTE [DISTWIDTH] IN BLOOD BY AUTOMATED COUNT: 13.5 % (ref 11.6–15.1)
GFR SERPL CREATININE-BSD FRML MDRD: 95 ML/MIN/1.73SQ M
GLUCOSE SERPL-MCNC: 87 MG/DL (ref 65–140)
HCT VFR BLD AUTO: 32 % (ref 34.8–46.1)
HGB BLD-MCNC: 10.1 G/DL (ref 11.5–15.4)
LYMPHOCYTES # BLD AUTO: 1.53 THOUSANDS/ΜL (ref 0.6–4.47)
LYMPHOCYTES NFR BLD AUTO: 22 % (ref 14–44)
MCH RBC QN AUTO: 26.2 PG (ref 26.8–34.3)
MCHC RBC AUTO-ENTMCNC: 31.6 G/DL (ref 31.4–37.4)
MCV RBC AUTO: 83 FL (ref 82–98)
MONOCYTES # BLD AUTO: 1.04 THOUSAND/ΜL (ref 0.17–1.22)
MONOCYTES NFR BLD AUTO: 15 % (ref 4–12)
NEUTROPHILS # BLD AUTO: 4.27 THOUSANDS/ΜL (ref 1.85–7.62)
NEUTS SEG NFR BLD AUTO: 63 % (ref 43–75)
PLATELET # BLD AUTO: 340 THOUSANDS/UL (ref 149–390)
PMV BLD AUTO: 9.9 FL (ref 8.9–12.7)
POTASSIUM SERPL-SCNC: 3.4 MMOL/L (ref 3.5–5.3)
RBC # BLD AUTO: 3.85 MILLION/UL (ref 3.81–5.12)
SODIUM SERPL-SCNC: 140 MMOL/L (ref 136–145)
WBC # BLD AUTO: 6.86 THOUSAND/UL (ref 4.31–10.16)

## 2018-04-10 PROCEDURE — 80048 BASIC METABOLIC PNL TOTAL CA: CPT | Performed by: PHYSICIAN ASSISTANT

## 2018-04-10 PROCEDURE — 99232 SBSQ HOSP IP/OBS MODERATE 35: CPT | Performed by: PHYSICIAN ASSISTANT

## 2018-04-10 PROCEDURE — 99232 SBSQ HOSP IP/OBS MODERATE 35: CPT | Performed by: INTERNAL MEDICINE

## 2018-04-10 PROCEDURE — 85025 COMPLETE CBC W/AUTO DIFF WBC: CPT | Performed by: NURSE PRACTITIONER

## 2018-04-10 RX ORDER — POTASSIUM CHLORIDE 20 MEQ/1
40 TABLET, EXTENDED RELEASE ORAL ONCE
Status: COMPLETED | OUTPATIENT
Start: 2018-04-10 | End: 2018-04-10

## 2018-04-10 RX ADMIN — Medication 400 MG: at 21:49

## 2018-04-10 RX ADMIN — CEFAZOLIN SODIUM 1000 MG: 1 SOLUTION INTRAVENOUS at 21:49

## 2018-04-10 RX ADMIN — POTASSIUM CHLORIDE 40 MEQ: 1500 TABLET, EXTENDED RELEASE ORAL at 13:07

## 2018-04-10 RX ADMIN — CEFAZOLIN SODIUM 1000 MG: 1 SOLUTION INTRAVENOUS at 16:23

## 2018-04-10 RX ADMIN — ENOXAPARIN SODIUM 30 MG: 30 INJECTION SUBCUTANEOUS at 10:09

## 2018-04-10 RX ADMIN — CEFAZOLIN SODIUM 1000 MG: 1 SOLUTION INTRAVENOUS at 05:32

## 2018-04-10 RX ADMIN — ACETAMINOPHEN 650 MG: 325 TABLET ORAL at 17:03

## 2018-04-10 RX ADMIN — VITAMIN D, TAB 1000IU (100/BT) 2000 UNITS: 25 TAB at 10:09

## 2018-04-10 RX ADMIN — ATORVASTATIN CALCIUM 20 MG: 20 TABLET, FILM COATED ORAL at 21:49

## 2018-04-10 NOTE — PROGRESS NOTES
Progress Note -Surgery DIANA Palaciosdeangelo Swanson Oas 80 y o  female MRN: [id number]  Unit/Bed#: -01 Encounter: [id number]    ASSESSMENT/PLAN:  Problem List     * (Principal)Sepsis (HCC)    HTN (hypertension), benign    HLD (hyperlipidemia)    Hyperlipidemia    Alzheimer's disease    Protein-calorie malnutrition (HCC)    Skin ulcer of sacrum with fat layer exposed ([address] )    Physical deconditioning    Anemia    Abscess   79 yo with chronic sacral wound which is draining spontaneously and she has no white count  · Continue conservative treatment  · Continue abx per ID   · Log roll Q2H   · Improve nutritional status  · Management per SLIM     VTE Pharmacologic Prophylaxis: Sequential compression device (Venodyne)  and Enoxaparin (Lovenox)    Subjective/Objective     Subjective:   No complaints    Objective/Physical Exam: Blood pressure (!) 101/49, pulse 76, temperature 98 5 °F (36 9 °C), temperature source Oral, resp  rate 18, height 5' 6" (1 676 m), SpO2 98 %  ,There is no height or weight on file to calculate BMI  General appearance: alert  Heart: regular rate and rhythm, S1, S2 normal, no murmur, click, rub or gallop  Lungs: clear to auscultation bilaterally  Abdomen: soft, non-tender; bowel sounds normal; no masses,  no organomegaly   Sacral region: 2x2 cm opening with mucoid yellowish drainage    No erythema, chronic skin changes in surrounding area  Neurological: normal without focal findings      Current Facility-Administered Medications:     acetaminophen (TYLENOL) tablet 650 mg, 650 mg, Oral, Q6H PRN, Derrick Fothergill, MD, 650 mg at 04/08/18 2139    aluminum-magnesium hydroxide-simethicone (MYLANTA) 200-200-20 mg/5 mL oral suspension 15 mL, 15 mL, Oral, Q6H PRN, Derrick Fothergill, MD    atorvastatin (LIPITOR) tablet 20 mg, 20 mg, Oral, HS, Derrick Fothergill, MD, 20 mg at 04/09/18 2125    ceFAZolin (ANCEF) IVPB (premix) 1,000 mg, 1,000 mg, Intravenous, Q8H, GERMÁN Sorto, Last Rate: 100 mL/hr at 04/10/18 0532, 1,000 mg at 04/10/18 0532    cholecalciferol (VITAMIN D3) tablet 2,000 Units, 2,000 Units, Oral, Daily, Germán East MD, 2,000 Units at 04/09/18 0827    enoxaparin (LOVENOX) subcutaneous injection 30 mg, 30 mg, Subcutaneous, Daily, Germán East MD, 30 mg at 04/09/18 0827    magnesium oxide (MAG-OX) tablet 400 mg, 400 mg, Oral, HS, Jama Anguiano PA-C, 400 mg at 04/09/18 2125    ondansetron (ZOFRAN) injection 4 mg, 4 mg, Intravenous, Q6H PRN, Germán East MD    polyvinyl alcohol (LIQUIFILM TEARS) 1 4 % ophthalmic solution 1 drop, 1 drop, Both Eyes, Q3H PRN, Germán East MD    traMADol (ULTRAM) tablet 50 mg, 50 mg, Oral, Q6H PRN, Germán East MD      Intake/Output Summary (Last 24 hours) at 04/10/18 0832  Last data filed at 04/09/18 1657   Gross per 24 hour   Intake          1336 67 ml   Output              600 ml   Net           736 67 ml     Lab, Imaging and other studies:   WBC 04/10/2018 6 86  4 31 - 10 16 Thousand/uL Final    RBC 04/10/2018 3 85  3 81 - 5 12 Million/uL Final    Hemoglobin 04/10/2018 10 1* 11 5 - 15 4 g/dL Final    Hematocrit 04/10/2018 32 0* 34 8 - 46 1 % Final    MCV 04/10/2018 83  82 - 98 fL Final    MCH 04/10/2018 26 2* 26 8 - 34 3 pg Final    MCHC 04/10/2018 31 6  31 4 - 37 4 g/dL Final    RDW 04/10/2018 13 5  11 6 - 15 1 % Final    MPV 04/10/2018 9 9  8 9 - 12 7 fL Final    Platelets 79/60/4635 340  149 - 390 Thousands/uL Final    Neutrophils Relative 04/10/2018 63  43 - 75 % Final    Lymphocytes Relative 04/10/2018 22  14 - 44 % Final    Monocytes Relative 04/10/2018 15* 4 - 12 % Final    Eosinophils Relative 04/10/2018 0  0 - 6 % Final    Basophils Relative 04/10/2018 0  0 - 1 % Final    Neutrophils Absolute 04/10/2018 4 27  1 85 - 7 62 Thousands/µL Final    Lymphocytes Absolute 04/10/2018 1 53  0 60 - 4 47 Thousands/µL Final    Monocytes Absolute 04/10/2018 1 04  0 17 - 1 22 Thousand/µL Final    Eosinophils Absolute 04/10/2018 0 00  0 00 - 0 61 Thousand/µL Final    Basophils Absolute 04/10/2018 0 02  0 00 - 0 10 Thousands/µL Final    Sodium 04/10/2018 140  136 - 145 mmol/L Final    Potassium 04/10/2018 3 4* 3 5 - 5 3 mmol/L Final    Chloride 04/10/2018 105  100 - 108 mmol/L Final    CO2 04/10/2018 27  21 - 32 mmol/L Final    Anion Gap 04/10/2018 8  4 - 13 mmol/L Final    BUN 04/10/2018 10  5 - 25 mg/dL Final    Creatinine 04/10/2018 0 67  0 60 - 1 30 mg/dL Final    Glucose 04/10/2018 87  65 - 140 mg/dL Final    Calcium 04/10/2018 9 6  mg/dL Final    eGFR 04/10/2018 95  ml/min/1 73sq m Final

## 2018-04-10 NOTE — ASSESSMENT & PLAN NOTE
Malnutrition Findings:   Malnutrition type: Chronic illness (r/t decreased appetite/intake, as evidenced by intake meeting <75% estimated energy needs for > 1 month, severe depletion of muscle mass noted at clavicle   Treated with liberalized diet, nutrition supplements )  Degree of Malnutrition: Other severe protein calorie malnutritionmuscle wasting    BMI Findings:   BMI 19 34    Encourage oral intake and add supplements

## 2018-04-10 NOTE — PROGRESS NOTES
Progress Note - Infectious Disease   Jennie Kessler 80 y o  female MRN: [id number]  Unit/Bed#: -01 Encounter: [id number]      Impression/Plan:  1  Sacral cellulitis  Source is most likely chronic sacral ulcer  Patient had low-grade fever and mild leukocytosis on admission  However, she had no clinical signs of of sepsis or systemic toxicity  WBC has normalized  She is afebrile and clinically improved  Admission blood cultures are negative after 24 hours  She is tolerating the antibiotics without difficulty  -continue IV cefazolin  -will likely transition to PO antibiotics within the next 24-48 hours if patient continues with clinical improvement  -monitor CBCD   -follow up blood cultures  2   Sacral abscess on CT  Patient appears to have spontaneous drainage  Surgery evaluation is noted  Plan for conservative management noted  With patient clinically improved, agree with conservative management  Certainly, if patient should show worsening clinical status, we may need to reimage sacrum and pursue surgical drainage at that time    -antibiotics as above   -monitor CBCD   -serial wound exams    3  Sacral decubitus ulcer  Chronic in nature                -serial wound exams              -frequent turning and repositioning              -follow up surgery              -follow up wound care     4  Leukocytosis  Likely secondary to 1  WBC improvement today from 9 42 to 6 86  Patient is afebrile  Blood cultures are negative at 24 hours    -antibiotics as above  -monitor CBCD  -follow up blood cultures  -no additional ID work up at this time     5  Dementia               -monitor for delirium              -serial exams    Antibiotics:  Ancef 2  Antibiotics 3    Subjective:  Patient has no fever, chills, sweats; no nausea, vomiting, diarrhea; no cough, shortness of breath; "sharp" pain in the wound, only occasionally when she moves  No new symptoms      Objective:  Vitals:  HR:  [76-88] 76  Resp:  [18] 18  BP: (101-118)/(49-57) 101/49  SpO2:  [97 %-98 %] 98 %  Temp (24hrs), Av 6 °F (37 °C), Min:98 1 °F (36 7 °C), Max:99 2 °F (37 3 °C)  Current: Temperature: 98 5 °F (36 9 °C)    Physical Exam:   General Appearance:  Alert, interactive with dementia, nontoxic, no acute distress  Throat: Oropharynx moist without lesions  Lungs:   Clear to auscultation bilaterally; no wheezes, rhonchi or rales; respirations unlabored   Heart:  RRR; no murmur, rub or gallop   Abdomen:   Soft, non-tender, non-distended, positive bowel sounds  Extremities: No clubbing or cyanosis, no edema   Skin: No new rashes or lesions  Sacral ulcer with very small purulent drainage, no surrounding erythema or fluctuance, no pain with palpation  Labs, Imaging, & Other studies:   All pertinent labs and imaging studies were personally reviewed    Results from last 7 days  Lab Units 04/10/18  0450 18  0502 18  1504   WBC Thousand/uL 6 86 9 42 11 39*   HEMOGLOBIN g/dL 10 1* 9 8* 10 9*   PLATELETS Thousands/uL 340 337 379       Results from last 7 days  Lab Units 04/10/18  0450 18  0502 18  1504   SODIUM mmol/L 140 139 138   POTASSIUM mmol/L 3 4* 3 5 4 5   CHLORIDE mmol/L 105 106 104   CO2 mmol/L 27 26 28   ANION GAP mmol/L 8 7 6   BUN mg/dL 10 9 10   CREATININE mg/dL 0 67 0 70 0 68   EGFR ml/min/1 73sq m 95 93 94   GLUCOSE RANDOM mg/dL 87 88 92   CALCIUM mg/dL 9 6 10 1 10 5*       Results from last 7 days  Lab Units 18   BLOOD CULTURE  No Growth at 24 hrs  No Growth at 24 hrs

## 2018-04-10 NOTE — PROGRESS NOTES
Progress Note - Phyllistine Curling 1935, 80 y o  female MRN: [id number]    Unit/Bed#: -01 Encounter: [id number]    Primary Care Provider: Nadia Alvarez MD   Date and time admitted to hospital: 4/8/2018  1:52 PM        * Sepsis [facility])   Assessment & Plan    · POA with fever, leukocytosis, abscess  · Clinically improving        Abscess   Assessment & Plan    · Spontaneously drained  · Local wound care per surgery  · ID consult appreciated  · Continue IV antibiotics        Skin ulcer of sacrum with fat layer exposed ([address] )   Assessment & Plan    · Surgery input appreciated  · Continue local wound care and frequent repositioning        Hypercalcemia   Assessment & Plan    · Chronic  · PCP follows as outpatient  · Currently asymptomatic         HTN (hypertension), benign   Assessment & Plan    · BP has been running on low side  · Cozaar, lopressor, and lasix currently on hold  · Monitor off meds for now        Alzheimer's disease   Assessment & Plan    · Supportive care        Protein-calorie malnutrition [facility])   Assessment & Plan    Malnutrition Findings:   Malnutrition type: Chronic illness (r/t decreased appetite/intake, as evidenced by intake meeting <75% estimated energy needs for > 1 month, severe depletion of muscle mass noted at clavicle  Treated with liberalized diet, nutrition supplements )  Degree of Malnutrition: Other severe protein calorie malnutritionmuscle wasting    BMI Findings:   BMI 19 34    Encourage oral intake and add supplements             Hypokalemia   Assessment & Plan    · replace          VTE Pharmacologic Prophylaxis:   Pharmacologic: Enoxaparin (Lovenox)  Mechanical VTE Prophylaxis in Place: Yes    Patient Centered Rounds: I have performed bedside rounds with nursing staff today  Discussions with Specialists or Other Care Team Provider: case management    Education and Discussions with Family / Patient: patient   Message left for daughter with update    Time Spent for Care: 30 minutes  More than 50% of total time spent on counseling and coordination of care as described above  Current Length of Stay: 2 day(s)    Current Patient Status: Inpatient   Certification Statement: The patient will continue to require additional inpatient hospital stay due to IV antibiotics    Discharge Plan: home with PT and family support when stable per ID    Code Status: Level 1 - Full Code      Subjective:   Patient offers no complaints, but is a poor historian secondary to dementia  No events overnight per nursing  Objective:     Vitals:   Temp (24hrs), Av 6 °F (37 °C), Min:98 1 °F (36 7 °C), Max:99 2 °F (37 3 °C)    HR:  [76-88] 76  Resp:  [18] 18  BP: (101-118)/(49-57) 101/49  SpO2:  [97 %-98 %] 98 %  There is no height or weight on file to calculate BMI  Input and Output Summary (last 24 hours): Intake/Output Summary (Last 24 hours) at 04/10/18 1031  Last data filed at 04/10/18 0900   Gross per 24 hour   Intake          1636 67 ml   Output              600 ml   Net          1036 67 ml       Physical Exam:     Physical Exam   Constitutional: She appears well-developed and well-nourished  No distress  HENT:   Head: Normocephalic and atraumatic  Cardiovascular: Normal rate and regular rhythm  Exam reveals no friction rub  No murmur heard  Pulmonary/Chest: Effort normal and breath sounds normal  No respiratory distress  She has no wheezes  Abdominal: Soft  Bowel sounds are normal  She exhibits no distension  There is no tenderness  There is no rebound and no guarding  Musculoskeletal: She exhibits no edema  Neurological: She is alert  No cranial nerve deficit  Skin: Skin is warm and dry  No rash noted  Sacral ulcer   Psychiatric: She has a normal mood and affect  Cognition and memory are impaired  Nursing note and vitals reviewed        Additional Data:     Labs:      Results from last 7 days  Lab Units 04/10/18  0450   WBC Thousand/uL 6 86   HEMOGLOBIN g/dL 10 1*   HEMATOCRIT % 32 0*   PLATELETS Thousands/uL 340   NEUTROS PCT % 63   LYMPHS PCT % 22   MONOS PCT % 15*   EOS PCT % 0       Results from last 7 days  Lab Units 04/10/18  0450   SODIUM mmol/L 140   POTASSIUM mmol/L 3 4*   CHLORIDE mmol/L 105   CO2 mmol/L 27   BUN mg/dL 10   CREATININE mg/dL 0 67   CALCIUM mg/dL 9 6   GLUCOSE RANDOM mg/dL 87           * I Have Reviewed All Lab Data Listed Above  * Additional Pertinent Lab Tests Reviewed: All Labs Within Last 24 Hours Reviewed    Imaging:    Imaging Reports Reviewed Today Include: none  Imaging Personally Reviewed by Myself Includes:  yvette    Recent Cultures (last 7 days):       Results from last 7 days  Lab Units 04/08/18 2013   BLOOD CULTURE  No Growth at 24 hrs  No Growth at 24 hrs  Last 24 Hours Medication List:     Current Facility-Administered Medications:  acetaminophen 650 mg Oral Q6H PRN Aayush Andujar MD    aluminum-magnesium hydroxide-simethicone 15 mL Oral Q6H PRN Aayush Andujar MD    atorvastatin 20 mg Oral HS Aayush Andujar MD    cefazolin 1,000 mg Intravenous GERMÁN Dia Last Rate: 1,000 mg (04/10/18 0532)   cholecalciferol 2,000 Units Oral Daily Aayush Andujar MD    enoxaparin 30 mg Subcutaneous Daily Aayush Andujar MD    magnesium oxide 400 mg Oral HS Kayleen Sánchez PA-C    ondansetron 4 mg Intravenous Q6H PRN Aayush Andujar MD    polyvinyl alcohol 1 drop Both Eyes Q3H PRN Aayush Andujar MD    potassium chloride 40 mEq Oral Once Michela Franco PA-C    traMADol 50 mg Oral Q6H PRN Aayush Andujar MD         Today, Patient Was Seen By: Michela Franco PA-C    ** Please Note: Dictation voice to text software may have been used in the creation of this document   **

## 2018-04-10 NOTE — ASSESSMENT & PLAN NOTE
· BP has been running on low side  · Cozaar, lopressor, and lasix currently on hold  · Monitor off meds for now

## 2018-04-10 NOTE — PLAN OF CARE
Problem: DISCHARGE PLANNING - CARE MANAGEMENT  Goal: Discharge to post-acute care or home with appropriate resources  INTERVENTIONS:  - Conduct assessment to determine patient/family and health care team treatment goals, and need for post-acute services based on payer coverage, community resources, and patient preferences, and barriers to discharge  - Address psychosocial, clinical, and financial barriers to discharge as identified in assessment in conjunction with the patient/family and health care team  - Arrange appropriate level of post-acute services according to patients   needs and preference and payer coverage in collaboration with the physician and health care team  - Communicate with and update the patient/family, physician, and health care team regarding progress on the discharge plan  - Arrange appropriate transportation to post-acute venues   Outcome: Progressing  LOS 2 DAYS  PATIENT IS NOT A BUNDLE  CM met with patient at bedside and determined that patient would like CM to speak with daughter  CM reviewed information with daughter and determined that they have [facility] and will require [facility] for PT and wound care  CM will send [facility] referral and will continue to follow pt  CM reviewed d/c planning process including the following: identifying help at home, patient preference for d/c planning needs, [place]charge [facility], availability of treatment team to discuss questions or concerns patient and/or family may have regarding understanding medications and recognizing signs and symptoms once discharged  CM also encouraged patient to follow up with all recommended appointments after discharge  Patient advised of importance for patient and family to participate in managing patients medical well being  CM will continue to follow patient through discharge  No other needs at this time   CM will continue to follow pt through d c

## 2018-04-10 NOTE — SOCIAL WORK
LOS 2 DAYS  PATIENT IS NOT A BUNDLE  CM met with patient at bedside and determined that patient would like CM to speak with daughter  CM reviewed information with daughter and determined that they have [facility] and will require [facility] for PT and wound care  CM will send [facility] referral and will continue to follow pt  CM reviewed d/c planning process including the following: identifying help at home, patient preference for d/c planning needs, Discharge [facility], [facility] Meds to Bed program, availability of treatment team to discuss questions or concerns patient and/or family may have regarding understanding medications and recognizing signs and symptoms once discharged  CM also encouraged patient to follow up with all recommended appointments after discharge  Patient advised of importance for patient and family to participate in managing patients medical well being  CM will continue to follow patient through discharge  No other needs at this time   CM will continue to follow pt through d c

## 2018-04-11 VITALS
OXYGEN SATURATION: 98 % | DIASTOLIC BLOOD PRESSURE: 57 MMHG | SYSTOLIC BLOOD PRESSURE: 109 MMHG | HEIGHT: 66 IN | RESPIRATION RATE: 17 BRPM | TEMPERATURE: 98.6 F | HEART RATE: 85 BPM

## 2018-04-11 PROBLEM — E43 SEVERE PROTEIN-CALORIE MALNUTRITION (HCC): Status: ACTIVE | Noted: 2018-04-11

## 2018-04-11 LAB
ANION GAP SERPL CALCULATED.3IONS-SCNC: 8 MMOL/L (ref 4–13)
BUN SERPL-MCNC: 13 MG/DL (ref 5–25)
CALCIUM SERPL-MCNC: 9.7 MG/DL
CHLORIDE SERPL-SCNC: 105 MMOL/L (ref 100–108)
CO2 SERPL-SCNC: 26 MMOL/L (ref 21–32)
CREAT SERPL-MCNC: 0.68 MG/DL (ref 0.6–1.3)
ERYTHROCYTE [DISTWIDTH] IN BLOOD BY AUTOMATED COUNT: 13.6 % (ref 11.6–15.1)
GFR SERPL CREATININE-BSD FRML MDRD: 94 ML/MIN/1.73SQ M
GLUCOSE SERPL-MCNC: 86 MG/DL (ref 65–140)
HCT VFR BLD AUTO: 33.2 % (ref 34.8–46.1)
HGB BLD-MCNC: 10.3 G/DL (ref 11.5–15.4)
MCH RBC QN AUTO: 25.9 PG (ref 26.8–34.3)
MCHC RBC AUTO-ENTMCNC: 31 G/DL (ref 31.4–37.4)
MCV RBC AUTO: 83 FL (ref 82–98)
PLATELET # BLD AUTO: 363 THOUSANDS/UL (ref 149–390)
PMV BLD AUTO: 10 FL (ref 8.9–12.7)
POTASSIUM SERPL-SCNC: 3.9 MMOL/L (ref 3.5–5.3)
RBC # BLD AUTO: 3.98 MILLION/UL (ref 3.81–5.12)
SODIUM SERPL-SCNC: 139 MMOL/L (ref 136–145)
WBC # BLD AUTO: 5.15 THOUSAND/UL (ref 4.31–10.16)

## 2018-04-11 PROCEDURE — 99239 HOSP IP/OBS DSCHRG MGMT >30: CPT | Performed by: PHYSICIAN ASSISTANT

## 2018-04-11 PROCEDURE — 99232 SBSQ HOSP IP/OBS MODERATE 35: CPT | Performed by: INTERNAL MEDICINE

## 2018-04-11 PROCEDURE — 85027 COMPLETE CBC AUTOMATED: CPT | Performed by: PHYSICIAN ASSISTANT

## 2018-04-11 PROCEDURE — 80048 BASIC METABOLIC PNL TOTAL CA: CPT | Performed by: PHYSICIAN ASSISTANT

## 2018-04-11 RX ORDER — CEPHALEXIN 250 MG/5ML
500 POWDER, FOR SUSPENSION ORAL EVERY 6 HOURS SCHEDULED
Status: DISCONTINUED | OUTPATIENT
Start: 2018-04-11 | End: 2018-04-11

## 2018-04-11 RX ORDER — CEPHALEXIN 500 MG/1
500 CAPSULE ORAL EVERY 6 HOURS SCHEDULED
Status: DISCONTINUED | OUTPATIENT
Start: 2018-04-11 | End: 2018-04-11 | Stop reason: HOSPADM

## 2018-04-11 RX ORDER — CEPHALEXIN 500 MG/1
500 CAPSULE ORAL EVERY 6 HOURS SCHEDULED
Qty: 40 CAPSULE | Refills: 0 | Status: SHIPPED | OUTPATIENT
Start: 2018-04-12 | End: 2018-04-22

## 2018-04-11 RX ADMIN — CEPHALEXIN 500 MG: 500 CAPSULE ORAL at 18:49

## 2018-04-11 RX ADMIN — CEPHALEXIN 500 MG: 500 CAPSULE ORAL at 14:41

## 2018-04-11 RX ADMIN — ENOXAPARIN SODIUM 30 MG: 30 INJECTION SUBCUTANEOUS at 09:30

## 2018-04-11 RX ADMIN — CEFAZOLIN SODIUM 1000 MG: 1 SOLUTION INTRAVENOUS at 06:01

## 2018-04-11 RX ADMIN — VITAMIN D, TAB 1000IU (100/BT) 2000 UNITS: 25 TAB at 09:30

## 2018-04-11 NOTE — PROGRESS NOTES
Progress Note -Surgery PA  Jennie Morales 80 y o  female MRN: [id number]  Unit/Bed#: -01 Encounter: [id number]    ASSESSMENT/PLAN:  Problem List      * (Principal)Sepsis ([facility] [address] )     HTN (hypertension), benign     HLD (hyperlipidemia)     Hyperlipidemia     Alzheimer's disease     Protein-calorie malnutrition (HCC)     Skin ulcer of sacrum with fat layer exposed ([facility] [address] )     Physical deconditioning     Anemia     Abscess   79 yo with chronic sacral wound which is draining spontaneously and she has no white count  · Continue conservative treatment  · Continue abx per ID will transition to PO  · Log roll Q2H   · Improve nutritional status  · Management per SLIM  · Follow up with wound care upon discharge    VTE Pharmacologic Prophylaxis: Sequential compression device (Venodyne)     Subjective/Objective     Subjective: No complaints or changes    Objective/Physical Exam: Blood pressure 110/58, pulse 83, temperature 98 7 °F (37 1 °C), temperature source Oral, resp  rate 18, height 5' 6" (1 676 m), SpO2 97 %  ,There is no height or weight on file to calculate BMI      General appearance: alert  Sacrum: mucinous drainage, no erythema    Current Facility-Administered Medications:     acetaminophen (TYLENOL) tablet 650 mg, 650 mg, Oral, Q6H PRN, Yvette Quiles MD, 650 mg at 04/10/18 1703    aluminum-magnesium hydroxide-simethicone (MYLANTA) 200-200-20 mg/5 mL oral suspension 15 mL, 15 mL, Oral, Q6H PRN, Yvette Quiles MD    atorvastatin (LIPITOR) tablet 20 mg, 20 mg, Oral, HS, Yvette Quiles MD, 20 mg at 04/10/18 2149    cephalexin (KEFLEX) capsule 500 mg, 500 mg, Oral, Q6H [facility], GERMÁN Sorto    cholecalciferol (VITAMIN D3) tablet 2,000 Units, 2,000 Units, Oral, Daily, Yvette Quiles MD, 2,000 Units at 04/11/18 0930    enoxaparin (LOVENOX) subcutaneous injection 30 mg, 30 mg, Subcutaneous, Daily, Yvette Quiles MD, 30 mg at 04/11/18 0930    magnesium oxide (MAG-OX) tablet 400 mg, 400 mg, Oral, HS, Sudha Caballero PA-C, 400 mg at 04/10/18 2779    ondansetron (ZOFRAN) injection 4 mg, 4 mg, Intravenous, Q6H PRN, Emilie Bradley MD    polyvinyl alcohol (LIQUIFILM TEARS) 1 4 % ophthalmic solution 1 drop, 1 drop, Both Eyes, Q3H PRN, Emilie Bradley MD    traMADol (ULTRAM) tablet 50 mg, 50 mg, Oral, Q6H PRN, Emilie Bradley MD      Intake/Output Summary (Last 24 hours) at 04/11/18 1128  Last data filed at 04/11/18 0934   Gross per 24 hour   Intake              960 ml   Output              725 ml   Net              235 ml       Lab, Imaging and other studies:   Sodium 04/11/2018 139  136 - 145 mmol/L Final    Potassium 04/11/2018 3 9  3 5 - 5 3 mmol/L Final    Chloride 04/11/2018 105  100 - 108 mmol/L Final    CO2 04/11/2018 26  21 - 32 mmol/L Final    Anion Gap 04/11/2018 8  4 - 13 mmol/L Final    BUN 04/11/2018 13  5 - 25 mg/dL Final    Creatinine 04/11/2018 0 68  0 60 - 1 30 mg/dL Final    Glucose 04/11/2018 86  65 - 140 mg/dL Final    Calcium 04/11/2018 9 7  mg/dL Final    eGFR 04/11/2018 94  ml/min/1 73sq m Final    WBC 04/11/2018 5 15  4 31 - 10 16 Thousand/uL Final    RBC 04/11/2018 3 98  3 81 - 5 12 Million/uL Final    Hemoglobin 04/11/2018 10 3* 11 5 - 15 4 g/dL Final    Hematocrit 04/11/2018 33 2* 34 8 - 46 1 % Final    MCV 04/11/2018 83  82 - 98 fL Final    MCH 04/11/2018 25 9* 26 8 - 34 3 pg Final    MCHC 04/11/2018 31 0* 31 4 - 37 4 g/dL Final    RDW 04/11/2018 13 6  11 6 - 15 1 % Final    Platelets 02/52/3960 363  149 - 390 Thousands/uL Final    MPV 04/11/2018 10 0  8 9 - 12 7 fL Final

## 2018-04-11 NOTE — ASSESSMENT & PLAN NOTE
· BP has been running on low side BP's ranging 067-548 systolically  · Cozaar, lopressor, and lasix currently on hold  · Volume status euvolemic and would be hesitant to restart lasix now in setting of low oral intake  · Monitor off meds for now  · Will need outpatient follow up with PCP

## 2018-04-11 NOTE — DISCHARGE INSTRUCTIONS
· Absorbant pad to sacral region - change daily or as needed  · Reposition every 2 hours  · Add a protein powder or nutritional supplement to diet twice a day  · Your blood pressure medications and lasix have been stopped because your blood pressure is currently running in normal range  Please follow up with your family doctor in 1 week to reassess the need for these medications  If you develop swelling in your legs, call sooner

## 2018-04-11 NOTE — PROGRESS NOTES
Called [facility] regarding wound care orders  No answer  Per discharge instructions cover with absorbant pad

## 2018-04-11 NOTE — ASSESSMENT & PLAN NOTE
· Spontaneously drained  · Local wound care per surgery  · ID consult appreciated    · Transition to oral Keflex for 10 more days

## 2018-04-11 NOTE — PROGRESS NOTES
Progress Note - Infectious Disease   Jennie Barrios 80 y o  female MRN: [id number]  Unit/Bed#: -01 Encounter: [id number]      Impression/Plan:  1   Sacral cellulitis   Source is most likely chronic sacral ulcer   Patient had low-grade fever and mild leukocytosis on admission  Darylamberly Aguirre, she had no clinical signs of of sepsis or systemic toxicity  WBC has normalized  She is afebrile and clinically improved  Admission blood cultures are negative after 48 hours  She is tolerating the antibiotics without difficulty   -stop IV cefazolin  -start Keflex 500mg PO q6 hours through 4/21/18 for a total of 14 days of treatment  -monitor CBCD              -follow up blood cultures      2   Sacral abscess on CT   Patient appears to have spontaneous drainage   Surgery evaluation is noted   Plan for conservative management noted   With patient clinically improved, agree with conservative management   Certainly, if patient should show worsening clinical status, we may need to reimage sacrum and pursue surgical drainage at that time               -antibiotics as above              -monitor CBCD              -serial wound exams     3  Sacral decubitus ulcer  Chronic in nature                -serial wound exams              -[id number] turning and repositioning              -follow up surgery              -follow up wound care     4  Leukocytosis  Likely secondary to 1  Blood cultures are negative at 48 hours    -antibiotics as above  -monitor CBCD  -follow up blood cultures  -no additional ID work up at this time     5  Dementia               -monitor for delirium              -serial exams    Antibiotics:  Keflex 1  Antibiotics 4    Subjective:  Patient with dementia but is very interactive  Patient has no fever, chills, sweats; no nausea, vomiting, diarrhea; no cough, shortness of breath; no pain but patient prefers to stay on her side because her sacral wound does bother her if she sits on it for extended periods of time   No new symptoms  Objective:  Vitals:  HR:  [73-83] 83  Resp:  [16-18] 18  BP: (110-137)/(56-62) 110/58  SpO2:  [97 %-100 %] 97 %  Temp (24hrs), Av 7 °F (37 1 °C), Min:98 6 °F (37 °C), Max:98 7 °F (37 1 °C)  Current: Temperature: 98 7 °F (37 1 °C)    Physical Exam:   General Appearance:  Alert, interactive with dementia, nontoxic, no acute distress  Throat: Oropharynx moist without lesions  Lungs:   Clear to auscultation bilaterally; no wheezes, rhonchi or rales; respirations unlabored   Heart:  RRR; no murmur, rub or gallop   Abdomen:   Soft, non-tender, non-distended, positive bowel sounds  Extremities: No clubbing or cyanosis, no edema   Skin: No new rashes or lesions  Sacral ulcer without purulent drainage, no surrounding erythema or fluctuance, no pain with palpation  Labs, Imaging, & Other studies:   All pertinent labs and imaging studies were personally reviewed    Results from last 7 days  Lab Units 18  0459 04/10/18  0450 18  0502   WBC Thousand/uL 5 15 6 86 9 42   HEMOGLOBIN g/dL 10 3* 10 1* 9 8*   PLATELETS Thousands/uL 363 340 337       Results from last 7 days  Lab Units 18  0459 04/10/18  0450 18  0502   SODIUM mmol/L 139 140 139   POTASSIUM mmol/L 3 9 3 4* 3 5   CHLORIDE mmol/L 105 105 106   CO2 mmol/L 26 27 26   ANION GAP mmol/L 8 8 7   BUN mg/dL 13 10 9   CREATININE mg/dL 0 68 0 67 0 70   EGFR ml/min/1 73sq m 94 95 93   GLUCOSE RANDOM mg/dL 86 87 88   CALCIUM mg/dL 9 7 9 6 10 1       Results from last 7 days  Lab Units 18   BLOOD CULTURE  No Growth at 48 hrs  No Growth at 48 hrs

## 2018-04-11 NOTE — DISCHARGE SUMMARY
Discharge- Cate Purpura 1935, 80 y o  female MRN: [id number]    Unit/Bed#: -01 Encounter: [id number]    Primary Care Provider: Ankit Moffett MD   Date and time admitted to hospital: 4/8/2018  1:52 PM        * Sepsis [facility])   Assessment & Plan    · POA with fever, leukocytosis, abscess  · Clinically improved        Abscess   Assessment & Plan    · Spontaneously drained  · Local wound care per surgery  · ID consult appreciated  · Transition to oral Keflex for 10 more days        Skin ulcer of sacrum with fat layer exposed ([address] )   Assessment & Plan    · Surgery input appreciated  · Continue local wound care and frequent repositioning  · Will have [facility] for wound care and can follow up in the wound care clinic        Hypercalcemia   Assessment & Plan    · Chronic  · PCP follows as outpatient  · Currently asymptomatic         HTN (hypertension), benign   Assessment & Plan    · BP has been running on low side BP's ranging 206-842 systolically  · Cozaar, lopressor, and lasix currently on hold  · Volume status euvolemic and would be hesitant to restart lasix now in setting of low oral intake  · Monitor off meds for now  · Will need outpatient follow up with PCP        Alzheimer's disease   Assessment & Plan    · Supportive care        Protein-calorie malnutrition [facility])   Assessment & Plan    Malnutrition Findings:   Malnutrition type: Chronic illness (r/t decreased appetite/intake, as evidenced by intake meeting <75% estimated energy needs for > 1 month, severe depletion of muscle mass noted at clavicle   Treated with liberalized diet, nutrition supplements )  Degree of Malnutrition: Other severe protein calorie malnutritionmuscle wasting    BMI Findings:   BMI 19 34    Encourage oral intake and add supplements             Hypokalemia   Assessment & Plan    · replaced              Resolved Problems  Date Reviewed: 4/11/2018    None          Consultations During Hospital Stay:  · Dr Jyoti Dexter  ·   [place]    Procedures Performed:     CT abd/pelvis - Decubitus subcutaneous ulcer(s) posterior to the coccyx  Colonic diverticulosis without diverticulitis    Significant Findings / Test Results:     · See above    Incidental Findings:   · none     Test Results Pending at Discharge (will require follow up):   · none     Outpatient Tests Requested:  · none    Complications:  none    Reason for Admission: fever    Hospital Course:     Koby Orozco is a 80 y o  female patient who originally presented to the hospital on 4/8/2018 due to sepsis secondary to a sacral ulcer  Patient was placed on IV antibiotics  General surgery was consulted  The sacral abscess spontaneously drained and no surgical intervention was required  Infectious Disease was consulted and followed for antibiotic recommendations  Patient be discharged home with 10 more days of oral Keflex  Patient was also initially hypotensive and her blood pressure medications including her Lasix were held  Over the course of her hospital stay, her blood pressures remained in the low 100s to 101 range systolic Myesha  Will continue to hold patient's blood pressure medications and recommend outpatient re-evaluation  Patient oral intake is also low and patient appears euvolemic, so will also keep the patient off of her Lasix  Please see above list of diagnoses and related plan for additional information  Condition at Discharge: good     Discharge Day Visit / Exam:     Subjective:  Offers no complaints   Vitals: Blood Pressure: 110/58 (04/11/18 0839)  Pulse: 83 (04/11/18 0839)  Temperature: 98 7 °F (37 1 °C) (04/11/18 0839)  Temp Source: Oral (04/11/18 0839)  Respirations: 18 (04/11/18 0839)  Height: 5' 6" (167 6 cm) (per pt) (04/08/18 1733)  SpO2: 97 % (04/11/18 0839)  Exam:   Physical Exam   Constitutional: She is oriented to person, place, and time  She appears well-developed and well-nourished  No distress  HENT:   Head: Normocephalic and atraumatic  Cardiovascular: Normal rate and regular rhythm  Exam reveals no friction rub  No murmur heard  Pulmonary/Chest: Effort normal and breath sounds normal  No respiratory distress  She has no wheezes  Abdominal: Soft  Bowel sounds are normal  She exhibits no distension  There is no tenderness  There is no rebound and no guarding  Musculoskeletal: She exhibits no edema  Neurological: She is alert and oriented to person, place, and time  No cranial nerve deficit  Skin: Skin is warm and dry  No rash noted  Sacral decub ulcer with minimal serous drainage   Psychiatric: She has a normal mood and affect  Cognition and memory are impaired  Nursing note and vitals reviewed  Discussion with Family: Left message for daughter with update    Discharge instructions/Information to patient and family:   See after visit summary for information provided to patient and family  Provisions for Follow-Up Care:  See after visit summary for information related to follow-up care and any pertinent home health orders  Disposition:     Home with [facility]NA Services (Reminder: Complete face to face encounter)    For Discharges to [facility] SNF:   · Not Applicable to this Patient - Not Applicable to this Patient    Planned Readmission: none     Discharge Statement:  I spent 50 minutes discharging the patient  This time was spent on the day of discharge  I had direct contact with the patient on the day of discharge  Greater than 50% of the total time was spent examining patient, answering all patient questions, arranging and discussing plan of care with patient as well as directly providing post-discharge instructions  Additional time then spent on discharge activities  Discharge Medications:  See after visit summary for reconciled discharge medications provided to patient and family        ** Please Note: This note has been constructed using a voice recognition system **

## 2018-04-11 NOTE — ASSESSMENT & PLAN NOTE
· Surgery input appreciated  · Continue local wound care and frequent repositioning  · Will have [facility] for wound care and can follow up in the wound care clinic

## 2018-04-12 ENCOUNTER — TRANSITIONAL CARE MANAGEMENT (OUTPATIENT)
Dept: INTERNAL MEDICINE CLINIC | Facility: CLINIC | Age: 83
End: 2018-04-12

## 2018-04-14 LAB
BACTERIA BLD CULT: NORMAL
BACTERIA BLD CULT: NORMAL

## 2018-04-17 ENCOUNTER — TELEPHONE (OUTPATIENT)
Dept: INTERNAL MEDICINE CLINIC | Facility: CLINIC | Age: 83
End: 2018-04-17

## 2018-05-07 ENCOUNTER — TELEPHONE (OUTPATIENT)
Dept: INTERNAL MEDICINE CLINIC | Facility: CLINIC | Age: 83
End: 2018-05-07

## 2018-05-07 NOTE — TELEPHONE ENCOUNTER
Swelling started over the weekend  No other symptoms  No open skin areas      AMAYA Rodriguez) instructed the family to limit the sodium, elevate legs when sitting & patient must be in bed at night

## 2018-05-07 NOTE — TELEPHONE ENCOUNTER
When did the swelling start? Few days? Any shortness of breath or other symptoms? Any open skin areas on the legs?

## 2018-05-07 NOTE — TELEPHONE ENCOUNTER
SILVER FROM [facility] CALLED  PT HAS EDEMA IN BOTH LOWER LEGS PLUS 2 ON THE RIGHT AND PLUS 1 ON THE LEFT   CALL SILVER AT [facility] TO [facility]

## 2018-05-09 NOTE — TELEPHONE ENCOUNTER
Spoke with daughter Margaret Herman who stated they are slightly swollen, patient is not complaining of any pain or discomfort  She thinks at night time she isn't keeping them elevated so her daughter is going to put a pillow underneath her legs to try and keep them elevated   Will call with any changes

## 2018-05-17 ENCOUNTER — OFFICE VISIT (OUTPATIENT)
Dept: INTERNAL MEDICINE CLINIC | Facility: CLINIC | Age: 83
End: 2018-05-17
Payer: COMMERCIAL

## 2018-05-17 VITALS
WEIGHT: 126 LBS | DIASTOLIC BLOOD PRESSURE: 78 MMHG | BODY MASS INDEX: 20.25 KG/M2 | TEMPERATURE: 98 F | SYSTOLIC BLOOD PRESSURE: 138 MMHG | RESPIRATION RATE: 18 BRPM | HEART RATE: 84 BPM | OXYGEN SATURATION: 98 % | HEIGHT: 66 IN

## 2018-05-17 DIAGNOSIS — E55.9 VITAMIN D DEFICIENCY: ICD-10-CM

## 2018-05-17 DIAGNOSIS — D64.89 ANEMIA DUE TO OTHER CAUSE, NOT CLASSIFIED: ICD-10-CM

## 2018-05-17 DIAGNOSIS — F02.80 LATE ONSET ALZHEIMER'S DISEASE WITHOUT BEHAVIORAL DISTURBANCE (HCC): Primary | ICD-10-CM

## 2018-05-17 DIAGNOSIS — E83.52 HYPERCALCEMIA: ICD-10-CM

## 2018-05-17 DIAGNOSIS — M81.0 AGE-RELATED OSTEOPOROSIS WITHOUT CURRENT PATHOLOGICAL FRACTURE: ICD-10-CM

## 2018-05-17 DIAGNOSIS — E83.42 HYPOMAGNESEMIA: ICD-10-CM

## 2018-05-17 DIAGNOSIS — E53.8 VITAMIN B12 DEFICIENCY: ICD-10-CM

## 2018-05-17 DIAGNOSIS — E87.6 HYPOKALEMIA: ICD-10-CM

## 2018-05-17 DIAGNOSIS — G30.1 LATE ONSET ALZHEIMER'S DISEASE WITHOUT BEHAVIORAL DISTURBANCE (HCC): Primary | ICD-10-CM

## 2018-05-17 DIAGNOSIS — I10 HTN (HYPERTENSION), BENIGN: ICD-10-CM

## 2018-05-17 DIAGNOSIS — L98.422 SKIN ULCER OF SACRUM WITH FAT LAYER EXPOSED (HCC): ICD-10-CM

## 2018-05-17 PROBLEM — A41.9 SEPSIS (HCC): Status: RESOLVED | Noted: 2018-04-08 | Resolved: 2018-05-17

## 2018-05-17 PROBLEM — L02.91 ABSCESS: Status: RESOLVED | Noted: 2018-04-08 | Resolved: 2018-05-17

## 2018-05-17 PROBLEM — G93.40 ACUTE ENCEPHALOPATHY: Status: RESOLVED | Noted: 2017-11-24 | Resolved: 2018-05-17

## 2018-05-17 PROCEDURE — 99214 OFFICE O/P EST MOD 30 MIN: CPT | Performed by: INTERNAL MEDICINE

## 2018-05-17 NOTE — PROGRESS NOTES
Assessment/Plan:    Alzheimer's disease  Seems better, ?on Seroquel  Skin ulcer of sacrum with fat layer exposed ([facility] [address] )  No open areas, recent discharge and minimal bleeding  Continue wound care with [facility] 3x a week  Severe protein-calorie malnutrition ([facility] [address] )  Weight gain since last visit, appetite has improved  BMI now 20  Peripheral edema  Stable  Anemia  Check iron studies  HTN (hypertension), benign  BP stable, no meds  Osteoporosis  On alendronate, ?D3  No calcium  Diagnoses and all orders for this visit:    Late onset Alzheimer's disease without behavioral disturbance    Skin ulcer of sacrum with fat layer exposed ([facility] [address] )    HTN (hypertension), benign  -     CBC and differential; Future  -     Comprehensive metabolic panel; Future  -     TSH, 3rd generation with T4 reflex; Future    Hypercalcemia    Hypokalemia    Hypomagnesemia  -     Magnesium; Future    Age-related osteoporosis without current pathological fracture    Vitamin B12 deficiency  -     Vitamin B12; Future    Vitamin D deficiency  -     Vitamin D 25 hydroxy; Future    Anemia due to other cause, not classified  -     CBC and differential; Future  -     Ferritin; Future  -     Iron; Future  -     TIBC; Future          Subjective:      Patient ID: Albert Bueno is a 80 y o  female  Ms Nathanael Dallas is here with a family friend  She reports that she has been doing well  She still has the wound on his sacral area, visiting nurses she dressing 3 times a week  She is eating better, moves her bowels about every other day  She denies any recent falls  She sleeps well most nights of the week  Her daughter Vicente Agrawal is now working  She dropped her off at her granddaughter's place where she cares for her 3 kids, she stays there during the day and sleeps in her home at night        The following portions of the patient's history were reviewed and updated as appropriate: allergies, current medications, past medical history, past social history and problem list     Review of Systems   Constitutional: Negative for appetite change and fatigue  HENT: Negative for congestion  Eyes: Negative for visual disturbance  Respiratory: Negative for cough and shortness of breath  Cardiovascular: Positive for leg swelling  Negative for chest pain  Gastrointestinal: Negative for abdominal pain, constipation and diarrhea  Genitourinary: Positive for urgency  Negative for dysuria and frequency  Musculoskeletal: Negative for arthralgias  Skin: Positive for wound  Negative for rash  Neurological: Negative for dizziness, weakness, numbness and headaches  Hematological: Does not bruise/bleed easily  Psychiatric/Behavioral: Positive for confusion  Negative for sleep disturbance  The patient is not nervous/anxious  Objective:      /78   Pulse 84   Temp 98 °F (36 7 °C)   Resp 18   Ht 5' 6" (1 676 m)   Wt 57 2 kg (126 lb)   SpO2 98%   BMI 20 34 kg/m²          Physical Exam   Constitutional: She is oriented to person, place, and time  She appears well-developed and well-nourished  HENT:   Head: Normocephalic and atraumatic  Nose: Nose normal    Eyes: Conjunctivae are normal  Pupils are equal, round, and reactive to light  Neck: Neck supple  Cardiovascular: Normal rate, regular rhythm and normal heart sounds  No edema  Pulmonary/Chest: Effort normal and breath sounds normal  She has no wheezes  She has no rales  Abdominal: Soft  Bowel sounds are normal    Neurological: She is alert and oriented to person, place, and time  Skin: Skin is warm  No rash noted  Sacral wound not examined  Psychiatric: She has a normal mood and affect  Her behavior is normal    Nursing note and vitals reviewed  Lab results reviewed with patient

## 2018-06-01 ENCOUNTER — TELEPHONE (OUTPATIENT)
Dept: INTERNAL MEDICINE CLINIC | Facility: CLINIC | Age: 83
End: 2018-06-01

## 2018-06-01 NOTE — TELEPHONE ENCOUNTER
SILVER FROM [facility] NEEDS AN ORDER FOR  EVAL  FAMILY IS OVERWHELMED WITH PTS CARE  CALL SILVER TO GIVE A VERBAL ORDER RATHER THAN  [phone]

## 2018-06-06 ENCOUNTER — TELEPHONE (OUTPATIENT)
Dept: INTERNAL MEDICINE CLINIC | Facility: CLINIC | Age: 83
End: 2018-06-06

## 2018-06-06 NOTE — TELEPHONE ENCOUNTER
terry from [facility] called  adi had an unwitnessed fall this morning  Not c/o pain  No injury that they can see

## 2018-06-06 NOTE — TELEPHONE ENCOUNTER
[facility] notified  States patient is not having any of the symptoms such as headache, dizziness, nausea, or joint pains

## 2018-06-13 ENCOUNTER — TELEPHONE (OUTPATIENT)
Dept: INTERNAL MEDICINE CLINIC | Facility: CLINIC | Age: 83
End: 2018-06-13

## 2018-06-13 NOTE — TELEPHONE ENCOUNTER
FYI: PT 'S SACRAL PRESSURE ULCER IS HEALED  CONTINUES W/ CHRONIC EDEMA OF BOTH LOWER LEGS-NO OPEN AREAS OR SIGNS OF INFECTION  SHE WILL BE DISCHARGED FROM NURSING-WILL CONT   W/ PT

## 2018-08-16 ENCOUNTER — TELEPHONE (OUTPATIENT)
Dept: INTERNAL MEDICINE CLINIC | Facility: CLINIC | Age: 83
End: 2018-08-16

## 2018-08-16 NOTE — TELEPHONE ENCOUNTER
They are now pt 's pcp-she enrolled in the senior life program  They need her med list faxed to [phone], ATTN: Thuy Vaughn

## 2018-08-17 NOTE — TELEPHONE ENCOUNTER
I faxed med list again with Dr Rissa Ortiz instructions for each med written on it to [facility]

## 2021-09-03 NOTE — SOCIAL WORK
CM intern spoke with Brian Arriaga from [facility] ([phone]) to set up transport  Pt is set up for transport at 4:30p today going to [facility]  The Auth number ( [id number]) has been obtained for SNF stay and BLS transport  Cm informed the nurse and daughter and the receiving facility ([phone])  about transport information  IMM reviewed with daughter 
CM left a message for Ms Mcconnell Early stating the facility is requesting a letter explaining pt's Alzheimer's is the primary diagnosis  Cm will continue to follow 
CM met with Pt and Pt's daughter Raj Cox at bedside  Pt was confused and CM completed open assessment with Pt's daughter  Per Pt's daughter she just noticed the confusion recently and more as the day goes on  Prior to admission, Pt was living alone in a [facility] with 2 floors with 2 ADRIEL  Pt uses a cane and was mostly independent with ADL's and ambulation, Pt's brother brought food for Pt  Pt does not have a HHC or Rehab history  [facility] and has Rx coverage  Pt does not have any D&A history  Pt's daughter reports that Pt had a brain injury a few years ago and has been declining since  Pt does not have a POA, CM provided daughter with information  Pt does not drive, her brother transports her to appointments  CM discussed that PT/OT would do evaluations with Pt to determine recommendation for after discharge  EVANGELISTA discussed STR with Pt's daughter and provided SNF list along with list of SNF's in network with Pt's insurance  CM discussed options as far as long term care, assisted living, and daughter moving in with Pt  CM provided Pt with 's business card for CM covering this upcoming week  CM dept will follow Pt until discharge 
Cm contacted liaison for [facility] who stated the facility would be in agreement to take pt without the Level II  They are requesting a letter from Ms Jael Felix stating that the Alzheimer's is the primary diagnosis  Insurance auth from [facility] will be needed for admission to rehab  PT has not seen pt recently ad will need to see pt in order to submit for auth  Cm contacted PT/OT who will see pt as soon as possible  CM will then send notes to [facility] to submit for auth  Cm will continue to follow to assist with needs and DCP 
Cm received phone call from Ms Danielito Whitfield who stated after meeting with the pt and speaking with the daughter she believes the pt's primary diagnosis would be the dementia/Alzheimer's which is contributing to the [address]  She stated Cm is able to contact [facility] to see if they would be willing to take pt or if they still want her to complete the Level II paperwork  Cm will contact [facility] to obtain a determination 
LOS 10  Not a bundle; Not a readmission  Cm received call from Candy Goncalves at [facility] who had questions realted to pt's cognition and hx of MH  After reviewing pt's clinical that was sent, she feels she is still not able to determine whether pt is primary dementia or MH  She will be in to assess the pt and contact the daughter  Cm provided Ms Marielle Madison with pt's room number  Cm will continue to follow to assist with needs and DCP 
LOS 4   Not a bundle; Not a readmission  Cm contacted pt's daughter to discuss choices for rehab  Daughter stated she was at work and the paper with choices is in the car  She stated she is not able to review list with Cm as she did not choose the facilities, a friend of the family did  Cm asked when it would be possible for daughter to call back with choices and daughter requested doing so tomorrow  Cm explained that since pt is ready for DC and insurance auth from [facility] [employer] would need to be obtained, it would be best for her to contact Cm as soon as possible today  Cm suggested being able to go to her car at lunch  Daughter stated she is able to do this and will get back to  around 2:30pm   Cm will continue to follow to assist with DCP 
LOS 4   Not a bundle; not a readmission  Cm received call from pt's daughter in order to discuss rehab choices  Cm s/w daughter at length as she had many questions  She does not want her mother to go to a [facility] and provided choices of [facility] and [facility]  Cm explained [facility] is owned by [facility] and she stated she would then only want a referral made to [address]  Daughter had many questions related to insurance and MA  Cm discussed potential LT placement as well  Cm directed some questions to the facility as they would know better  Referral was made to [facility] who is able to accept him and began starting the process for auth from [facility] [employer]  Liaison then found pt has been in [address] in March of 2017 however pt has severe dementia  Cm called and left a message for DEEPA QUEVEDO at [facility] to discuss further  Cm completed PASRR and uploaded it into [employer]  Cm will follow up with Co and Level II if needed as well as DC 
LOS 7   Not a bundle; Not a readmission  Pt will need to have Level II Assessment completed prior to going to rehab or SNF  MAYANK DURÁN-Floyd and clinical documentation has been completed and faxed to [facility]  Cm will continue to follow to assist with DCP 
TC to Neisha at [phone] with [facility] to discuss if pt would be a target admission for SNF  Check pt's admission in March 2017 which was for psych and not dementia  Pt was admitted for psychosis and had hallucinations  Neisha stated the pt would need to be option  CM will f/u with paper work  Neisha was all the paper fax at one time to [phone] 
No

## 2021-10-12 ENCOUNTER — APPOINTMENT (EMERGENCY)
Dept: CT IMAGING | Facility: HOSPITAL | Age: 86
End: 2021-10-12
Payer: MEDICARE

## 2021-10-12 ENCOUNTER — HOSPITAL ENCOUNTER (OUTPATIENT)
Facility: HOSPITAL | Age: 86
Setting detail: OBSERVATION
Discharge: HOME | End: 2021-10-13
Admitting: INTERNAL MEDICINE
Payer: MEDICARE

## 2021-10-12 ENCOUNTER — APPOINTMENT (EMERGENCY)
Dept: RADIOLOGY | Facility: HOSPITAL | Age: 86
End: 2021-10-12
Payer: MEDICARE

## 2021-10-12 DIAGNOSIS — R41.82 ALTERED MENTAL STATUS, UNSPECIFIED ALTERED MENTAL STATUS TYPE: Primary | ICD-10-CM

## 2021-10-12 PROBLEM — F03.90 DEMENTIA (HCC): Status: ACTIVE | Noted: 2017-11-26

## 2021-10-12 LAB
ALBUMIN SERPL BCP-MCNC: 3.8 G/DL (ref 3.4–4.8)
ALP SERPL-CCNC: 48.9 U/L (ref 35–140)
ALT SERPL W P-5'-P-CCNC: 12 U/L (ref 5–54)
ANION GAP SERPL CALCULATED.3IONS-SCNC: 7 MMOL/L (ref 4–13)
APTT PPP: 37 SECONDS (ref 23–37)
AST SERPL W P-5'-P-CCNC: 27 U/L (ref 15–41)
ATRIAL RATE: 94 BPM
BASOPHILS # BLD AUTO: 0.01 THOUSANDS/ΜL (ref 0–0.1)
BASOPHILS NFR BLD AUTO: 0 % (ref 0–1)
BILIRUB SERPL-MCNC: 1.04 MG/DL (ref 0.3–1.2)
BILIRUB UR QL STRIP: NEGATIVE
BUN SERPL-MCNC: 8 MG/DL (ref 6–20)
CALCIUM SERPL-MCNC: 10.1 MG/DL (ref 8.4–10.2)
CHLORIDE SERPL-SCNC: 101 MMOL/L (ref 96–108)
CLARITY UR: CLEAR
CO2 SERPL-SCNC: 28 MMOL/L (ref 22–33)
COLOR UR: YELLOW
CREAT SERPL-MCNC: 0.82 MG/DL (ref 0.4–1.1)
EOSINOPHIL # BLD AUTO: 0 THOUSAND/ΜL (ref 0–0.61)
EOSINOPHIL NFR BLD AUTO: 0 % (ref 0–6)
ERYTHROCYTE [DISTWIDTH] IN BLOOD BY AUTOMATED COUNT: 13.8 % (ref 11.6–15.1)
GFR SERPL CREATININE-BSD FRML MDRD: 75 ML/MIN/1.73SQ M
GLUCOSE SERPL-MCNC: 91 MG/DL (ref 65–140)
GLUCOSE UR STRIP-MCNC: NEGATIVE MG/DL
HCT VFR BLD AUTO: 45.4 % (ref 34.8–46.1)
HGB BLD-MCNC: 15.2 G/DL (ref 11.5–15.4)
HGB UR QL STRIP.AUTO: NEGATIVE
IMM GRANULOCYTES # BLD AUTO: 0.03 THOUSAND/UL (ref 0–0.2)
IMM GRANULOCYTES NFR BLD AUTO: 0 % (ref 0–2)
INR PPP: 1.05 (ref 0.84–1.19)
KETONES UR STRIP-MCNC: ABNORMAL MG/DL
LEUKOCYTE ESTERASE UR QL STRIP: NEGATIVE
LYMPHOCYTES # BLD AUTO: 0.74 THOUSANDS/ΜL (ref 0.6–4.47)
LYMPHOCYTES NFR BLD AUTO: 8 % (ref 14–44)
MCH RBC QN AUTO: 27.8 PG (ref 26.8–34.3)
MCHC RBC AUTO-ENTMCNC: 33.5 G/DL (ref 31.4–37.4)
MCV RBC AUTO: 83 FL (ref 82–98)
MONOCYTES # BLD AUTO: 1.58 THOUSAND/ΜL (ref 0.17–1.22)
MONOCYTES NFR BLD AUTO: 16 % (ref 4–12)
NEUTROPHILS # BLD AUTO: 7.41 THOUSANDS/ΜL (ref 1.85–7.62)
NEUTS SEG NFR BLD AUTO: 76 % (ref 43–75)
NITRITE UR QL STRIP: NEGATIVE
P AXIS: 72 DEGREES
PH UR STRIP.AUTO: 7 [PH]
PLATELET # BLD AUTO: 221 THOUSANDS/UL (ref 149–390)
PMV BLD AUTO: 10.5 FL (ref 8.9–12.7)
POTASSIUM SERPL-SCNC: 3.9 MMOL/L (ref 3.5–5)
PR INTERVAL: 172 MS
PROT SERPL-MCNC: 7.7 G/DL (ref 6.4–8.3)
PROT UR STRIP-MCNC: NEGATIVE MG/DL
PROTHROMBIN TIME: 13.6 SECONDS (ref 11.6–14.5)
QRS AXIS: 58 DEGREES
QRSD INTERVAL: 88 MS
QT INTERVAL: 357 MS
QTC INTERVAL: 447 MS
RBC # BLD AUTO: 5.46 MILLION/UL (ref 3.81–5.12)
SODIUM SERPL-SCNC: 136 MMOL/L (ref 133–145)
SP GR UR STRIP.AUTO: 1.01 (ref 1–1.03)
T WAVE AXIS: 57 DEGREES
TROPONIN I SERPL-MCNC: <0.03 NG/ML (ref 0–0.07)
UROBILINOGEN UR QL STRIP.AUTO: 2 E.U./DL
VENTRICULAR RATE: 94 BPM
WBC # BLD AUTO: 9.77 THOUSAND/UL (ref 4.31–10.16)

## 2021-10-12 PROCEDURE — 70450 CT HEAD/BRAIN W/O DYE: CPT

## 2021-10-12 PROCEDURE — 96360 HYDRATION IV INFUSION INIT: CPT

## 2021-10-12 PROCEDURE — 71045 X-RAY EXAM CHEST 1 VIEW: CPT

## 2021-10-12 PROCEDURE — 85610 PROTHROMBIN TIME: CPT

## 2021-10-12 PROCEDURE — 80053 COMPREHEN METABOLIC PANEL: CPT

## 2021-10-12 PROCEDURE — G1004 CDSM NDSC: HCPCS

## 2021-10-12 PROCEDURE — 85730 THROMBOPLASTIN TIME PARTIAL: CPT

## 2021-10-12 PROCEDURE — 84484 ASSAY OF TROPONIN QUANT: CPT

## 2021-10-12 PROCEDURE — 85025 COMPLETE CBC W/AUTO DIFF WBC: CPT

## 2021-10-12 PROCEDURE — 82948 REAGENT STRIP/BLOOD GLUCOSE: CPT

## 2021-10-12 PROCEDURE — 36415 COLL VENOUS BLD VENIPUNCTURE: CPT

## 2021-10-12 PROCEDURE — 99285 EMERGENCY DEPT VISIT HI MDM: CPT

## 2021-10-12 PROCEDURE — 93010 ELECTROCARDIOGRAM REPORT: CPT | Performed by: INTERNAL MEDICINE

## 2021-10-12 PROCEDURE — 96361 HYDRATE IV INFUSION ADD-ON: CPT

## 2021-10-12 PROCEDURE — 81003 URINALYSIS AUTO W/O SCOPE: CPT

## 2021-10-12 PROCEDURE — 93005 ELECTROCARDIOGRAM TRACING: CPT

## 2021-10-12 PROCEDURE — 99220 PR INITIAL OBSERVATION CARE/DAY 70 MINUTES: CPT | Performed by: INTERNAL MEDICINE

## 2021-10-12 RX ORDER — SODIUM CHLORIDE 9 MG/ML
100 INJECTION, SOLUTION INTRAVENOUS CONTINUOUS
Status: DISCONTINUED | OUTPATIENT
Start: 2021-10-12 | End: 2021-10-13

## 2021-10-12 RX ORDER — ACETAMINOPHEN 325 MG/1
650 TABLET ORAL EVERY 6 HOURS PRN
Status: DISCONTINUED | OUTPATIENT
Start: 2021-10-12 | End: 2021-10-13 | Stop reason: HOSPADM

## 2021-10-12 RX ORDER — ATORVASTATIN CALCIUM 20 MG/1
20 TABLET, FILM COATED ORAL
Status: DISCONTINUED | OUTPATIENT
Start: 2021-10-12 | End: 2021-10-13 | Stop reason: HOSPADM

## 2021-10-12 RX ORDER — SODIUM CHLORIDE, SODIUM LACTATE, POTASSIUM CHLORIDE, CALCIUM CHLORIDE 600; 310; 30; 20 MG/100ML; MG/100ML; MG/100ML; MG/100ML
75 INJECTION, SOLUTION INTRAVENOUS CONTINUOUS
Status: DISCONTINUED | OUTPATIENT
Start: 2021-10-12 | End: 2021-10-13 | Stop reason: HOSPADM

## 2021-10-12 RX ORDER — DOCUSATE SODIUM 100 MG/1
100 CAPSULE, LIQUID FILLED ORAL 2 TIMES DAILY
Status: DISCONTINUED | OUTPATIENT
Start: 2021-10-12 | End: 2021-10-13 | Stop reason: HOSPADM

## 2021-10-12 RX ORDER — TRAMADOL HYDROCHLORIDE 50 MG/1
50 TABLET ORAL EVERY 8 HOURS PRN
Status: DISCONTINUED | OUTPATIENT
Start: 2021-10-12 | End: 2021-10-13 | Stop reason: HOSPADM

## 2021-10-12 RX ORDER — QUETIAPINE FUMARATE 25 MG/1
25 TABLET, FILM COATED ORAL
Status: DISCONTINUED | OUTPATIENT
Start: 2021-10-12 | End: 2021-10-13 | Stop reason: HOSPADM

## 2021-10-12 RX ADMIN — SODIUM CHLORIDE 100 ML/HR: 0.9 INJECTION, SOLUTION INTRAVENOUS at 11:07

## 2021-10-12 RX ADMIN — SODIUM CHLORIDE, SODIUM LACTATE, POTASSIUM CHLORIDE, AND CALCIUM CHLORIDE 75 ML/HR: .6; .31; .03; .02 INJECTION, SOLUTION INTRAVENOUS at 17:45

## 2021-10-12 RX ADMIN — ATORVASTATIN CALCIUM 20 MG: 20 TABLET, FILM COATED ORAL at 22:54

## 2021-10-12 RX ADMIN — QUETIAPINE FUMARATE 25 MG: 25 TABLET ORAL at 22:54

## 2021-10-13 VITALS
HEIGHT: 65 IN | RESPIRATION RATE: 15 BRPM | TEMPERATURE: 98.5 F | SYSTOLIC BLOOD PRESSURE: 164 MMHG | OXYGEN SATURATION: 97 % | WEIGHT: 120.59 LBS | BODY MASS INDEX: 20.09 KG/M2 | DIASTOLIC BLOOD PRESSURE: 78 MMHG | HEART RATE: 89 BPM

## 2021-10-13 PROCEDURE — 99217 PR OBSERVATION CARE DISCHARGE MANAGEMENT: CPT | Performed by: NURSE PRACTITIONER

## 2021-10-13 PROCEDURE — 97163 PT EVAL HIGH COMPLEX 45 MIN: CPT

## 2021-10-13 RX ADMIN — ENOXAPARIN SODIUM 30 MG: 30 INJECTION SUBCUTANEOUS at 09:35

## 2021-10-13 RX ADMIN — DOCUSATE SODIUM 100 MG: 100 CAPSULE, LIQUID FILLED ORAL at 09:35

## 2021-10-13 RX ADMIN — SODIUM CHLORIDE, SODIUM LACTATE, POTASSIUM CHLORIDE, AND CALCIUM CHLORIDE 75 ML/HR: .6; .31; .03; .02 INJECTION, SOLUTION INTRAVENOUS at 09:39

## 2021-10-13 RX ADMIN — DOCUSATE SODIUM 100 MG: 100 CAPSULE, LIQUID FILLED ORAL at 17:33

## 2021-10-20 LAB — GLUCOSE SERPL-MCNC: 80 MG/DL (ref 65–140)

## 2021-12-22 ENCOUNTER — TELEPHONE (OUTPATIENT)
Dept: RADIOLOGY | Facility: HOSPITAL | Age: 86
End: 2021-12-22

## 2021-12-22 ENCOUNTER — HOSPITAL ENCOUNTER (INPATIENT)
Facility: HOSPITAL | Age: 86
LOS: 3 days | Discharge: HOME | DRG: 072 | End: 2021-12-26
Attending: EMERGENCY MEDICINE | Admitting: INTERNAL MEDICINE
Payer: MEDICARE

## 2021-12-22 ENCOUNTER — APPOINTMENT (OUTPATIENT)
Dept: NON INVASIVE DIAGNOSTICS | Facility: HOSPITAL | Age: 86
DRG: 072 | End: 2021-12-22
Payer: MEDICARE

## 2021-12-22 DIAGNOSIS — R29.90 STROKE-LIKE SYMPTOM: ICD-10-CM

## 2021-12-22 DIAGNOSIS — R47.81 SLURRED SPEECH: ICD-10-CM

## 2021-12-22 DIAGNOSIS — E78.2 MIXED HYPERLIPIDEMIA: ICD-10-CM

## 2021-12-22 DIAGNOSIS — R29.90 STROKE-LIKE SYMPTOMS: Primary | ICD-10-CM

## 2021-12-22 LAB
AMMONIA PLAS-SCNC: 15 UMOL/L (ref 11–35)
ANION GAP SERPL CALCULATED.3IONS-SCNC: 4 MMOL/L (ref 4–13)
APTT PPP: 35 SECONDS (ref 23–37)
ATRIAL RATE: 75 BPM
BUN SERPL-MCNC: 13 MG/DL (ref 5–25)
CALCIUM SERPL-MCNC: 8.8 MG/DL (ref 8.3–10.1)
CARDIAC TROPONIN I PNL SERPL HS: 10 NG/L
CHLORIDE SERPL-SCNC: 104 MMOL/L (ref 100–108)
CHOLEST SERPL-MCNC: 139 MG/DL
CO2 SERPL-SCNC: 27 MMOL/L (ref 21–32)
CREAT SERPL-MCNC: 0.57 MG/DL (ref 0.6–1.3)
ERYTHROCYTE [DISTWIDTH] IN BLOOD BY AUTOMATED COUNT: 13.8 % (ref 11.6–15.1)
EST. AVERAGE GLUCOSE BLD GHB EST-MCNC: 105 MG/DL
FLUAV RNA RESP QL NAA+PROBE: NEGATIVE
FLUBV RNA RESP QL NAA+PROBE: NEGATIVE
GFR SERPL CREATININE-BSD FRML MDRD: 84 ML/MIN/1.73SQ M
GLUCOSE SERPL-MCNC: 87 MG/DL (ref 65–140)
GLUCOSE SERPL-MCNC: 87 MG/DL (ref 65–140)
HBA1C MFR BLD: 5.3 %
HCT VFR BLD AUTO: 37.9 % (ref 34.8–46.1)
HDLC SERPL-MCNC: 63 MG/DL
HGB BLD-MCNC: 12.2 G/DL (ref 11.5–15.4)
INR PPP: 1.07 (ref 0.84–1.19)
LDLC SERPL CALC-MCNC: 67 MG/DL (ref 0–100)
MCH RBC QN AUTO: 28.1 PG (ref 26.8–34.3)
MCHC RBC AUTO-ENTMCNC: 32.2 G/DL (ref 31.4–37.4)
MCV RBC AUTO: 87 FL (ref 82–98)
P AXIS: 72 DEGREES
PLATELET # BLD AUTO: 236 THOUSANDS/UL (ref 149–390)
PMV BLD AUTO: 10.8 FL (ref 8.9–12.7)
POTASSIUM SERPL-SCNC: 3.7 MMOL/L (ref 3.5–5.3)
PR INTERVAL: 174 MS
PROTHROMBIN TIME: 13.5 SECONDS (ref 11.6–14.5)
QRS AXIS: 82 DEGREES
QRSD INTERVAL: 80 MS
QT INTERVAL: 384 MS
QTC INTERVAL: 426 MS
RBC # BLD AUTO: 4.34 MILLION/UL (ref 3.81–5.12)
RSV RNA RESP QL NAA+PROBE: NEGATIVE
SARS-COV-2 RNA RESP QL NAA+PROBE: NEGATIVE
SODIUM SERPL-SCNC: 135 MMOL/L (ref 136–145)
T WAVE AXIS: 34 DEGREES
TRIGL SERPL-MCNC: 44 MG/DL
VENTRICULAR RATE: 74 BPM
WBC # BLD AUTO: 5.67 THOUSAND/UL (ref 4.31–10.16)

## 2021-12-22 PROCEDURE — 80048 BASIC METABOLIC PNL TOTAL CA: CPT | Performed by: EMERGENCY MEDICINE

## 2021-12-22 PROCEDURE — 93010 ELECTROCARDIOGRAM REPORT: CPT | Performed by: INTERNAL MEDICINE

## 2021-12-22 PROCEDURE — 84484 ASSAY OF TROPONIN QUANT: CPT | Performed by: EMERGENCY MEDICINE

## 2021-12-22 PROCEDURE — 93970 EXTREMITY STUDY: CPT

## 2021-12-22 PROCEDURE — 99220 PR INITIAL OBSERVATION CARE/DAY 70 MINUTES: CPT | Performed by: INTERNAL MEDICINE

## 2021-12-22 PROCEDURE — 99285 EMERGENCY DEPT VISIT HI MDM: CPT | Performed by: EMERGENCY MEDICINE

## 2021-12-22 PROCEDURE — 85027 COMPLETE CBC AUTOMATED: CPT | Performed by: EMERGENCY MEDICINE

## 2021-12-22 PROCEDURE — 85730 THROMBOPLASTIN TIME PARTIAL: CPT | Performed by: EMERGENCY MEDICINE

## 2021-12-22 PROCEDURE — 85610 PROTHROMBIN TIME: CPT | Performed by: EMERGENCY MEDICINE

## 2021-12-22 PROCEDURE — 83036 HEMOGLOBIN GLYCOSYLATED A1C: CPT | Performed by: INTERNAL MEDICINE

## 2021-12-22 PROCEDURE — 93005 ELECTROCARDIOGRAM TRACING: CPT

## 2021-12-22 PROCEDURE — 36415 COLL VENOUS BLD VENIPUNCTURE: CPT | Performed by: EMERGENCY MEDICINE

## 2021-12-22 PROCEDURE — 82948 REAGENT STRIP/BLOOD GLUCOSE: CPT

## 2021-12-22 PROCEDURE — 82140 ASSAY OF AMMONIA: CPT | Performed by: EMERGENCY MEDICINE

## 2021-12-22 PROCEDURE — 93970 EXTREMITY STUDY: CPT | Performed by: SURGERY

## 2021-12-22 PROCEDURE — 80061 LIPID PANEL: CPT | Performed by: INTERNAL MEDICINE

## 2021-12-22 PROCEDURE — 0241U HB NFCT DS VIR RESP RNA 4 TRGT: CPT | Performed by: EMERGENCY MEDICINE

## 2021-12-22 PROCEDURE — 99205 OFFICE O/P NEW HI 60 MIN: CPT | Performed by: PSYCHIATRY & NEUROLOGY

## 2021-12-22 PROCEDURE — 99285 EMERGENCY DEPT VISIT HI MDM: CPT

## 2021-12-22 RX ORDER — ONDANSETRON 2 MG/ML
4 INJECTION INTRAMUSCULAR; INTRAVENOUS EVERY 6 HOURS PRN
Status: DISCONTINUED | OUTPATIENT
Start: 2021-12-22 | End: 2021-12-26 | Stop reason: HOSPADM

## 2021-12-22 RX ORDER — CALCIUM CARBONATE 200(500)MG
1000 TABLET,CHEWABLE ORAL DAILY PRN
Status: DISCONTINUED | OUTPATIENT
Start: 2021-12-22 | End: 2021-12-26 | Stop reason: HOSPADM

## 2021-12-22 RX ORDER — SODIUM CHLORIDE 9 MG/ML
75 INJECTION, SOLUTION INTRAVENOUS CONTINUOUS
Status: DISPENSED | OUTPATIENT
Start: 2021-12-22 | End: 2021-12-23

## 2021-12-22 RX ORDER — ATORVASTATIN CALCIUM 20 MG/1
20 TABLET, FILM COATED ORAL
Status: DISCONTINUED | OUTPATIENT
Start: 2021-12-22 | End: 2021-12-23

## 2021-12-22 RX ORDER — MELATONIN
1000 DAILY
Status: DISCONTINUED | OUTPATIENT
Start: 2021-12-22 | End: 2021-12-24

## 2021-12-22 RX ORDER — ASPIRIN 300 MG/1
300 SUPPOSITORY RECTAL ONCE
Status: COMPLETED | OUTPATIENT
Start: 2021-12-22 | End: 2021-12-22

## 2021-12-22 RX ORDER — ASPIRIN 81 MG/1
81 TABLET, CHEWABLE ORAL DAILY
Status: DISCONTINUED | OUTPATIENT
Start: 2021-12-22 | End: 2021-12-26 | Stop reason: HOSPADM

## 2021-12-22 RX ORDER — SENNOSIDES 8.6 MG
2 TABLET ORAL DAILY PRN
Status: DISCONTINUED | OUTPATIENT
Start: 2021-12-22 | End: 2021-12-26 | Stop reason: HOSPADM

## 2021-12-22 RX ORDER — ACETAMINOPHEN 325 MG/1
650 TABLET ORAL EVERY 6 HOURS PRN
Status: DISCONTINUED | OUTPATIENT
Start: 2021-12-22 | End: 2021-12-26 | Stop reason: HOSPADM

## 2021-12-22 RX ORDER — DOCUSATE SODIUM 100 MG/1
100 CAPSULE, LIQUID FILLED ORAL 2 TIMES DAILY
Status: DISCONTINUED | OUTPATIENT
Start: 2021-12-22 | End: 2021-12-26 | Stop reason: HOSPADM

## 2021-12-22 RX ADMIN — ASPIRIN 300 MG: 300 SUPPOSITORY RECTAL at 15:44

## 2021-12-22 RX ADMIN — DOCUSATE SODIUM 100 MG: 100 CAPSULE ORAL at 19:56

## 2021-12-22 RX ADMIN — IOHEXOL 85 ML: 350 INJECTION, SOLUTION INTRAVENOUS at 12:06

## 2021-12-22 RX ADMIN — SODIUM CHLORIDE 75 ML/HR: 0.9 INJECTION, SOLUTION INTRAVENOUS at 18:24

## 2021-12-22 RX ADMIN — ENOXAPARIN SODIUM 40 MG: 40 INJECTION SUBCUTANEOUS at 15:44

## 2021-12-23 ENCOUNTER — APPOINTMENT (INPATIENT)
Dept: RADIOLOGY | Facility: HOSPITAL | Age: 86
DRG: 072 | End: 2021-12-23
Payer: MEDICARE

## 2021-12-23 ENCOUNTER — APPOINTMENT (OUTPATIENT)
Dept: NON INVASIVE DIAGNOSTICS | Facility: HOSPITAL | Age: 86
DRG: 072 | End: 2021-12-23
Payer: MEDICARE

## 2021-12-23 LAB
ANION GAP SERPL CALCULATED.3IONS-SCNC: 3 MMOL/L (ref 4–13)
AORTIC ROOT: 3.2 CM
APICAL FOUR CHAMBER EJECTION FRACTION: 55 %
ASCENDING AORTA: 3.2 CM
BUN SERPL-MCNC: 12 MG/DL (ref 5–25)
CALCIUM SERPL-MCNC: 9.2 MG/DL (ref 8.3–10.1)
CHLORIDE SERPL-SCNC: 111 MMOL/L (ref 100–108)
CO2 SERPL-SCNC: 25 MMOL/L (ref 21–32)
CREAT SERPL-MCNC: 0.64 MG/DL (ref 0.6–1.3)
E WAVE DECELERATION TIME: 166 MS
ERYTHROCYTE [DISTWIDTH] IN BLOOD BY AUTOMATED COUNT: 14.3 % (ref 11.6–15.1)
FRACTIONAL SHORTENING: 30 % (ref 28–44)
GFR SERPL CREATININE-BSD FRML MDRD: 81 ML/MIN/1.73SQ M
GLUCOSE SERPL-MCNC: 63 MG/DL (ref 65–140)
HCT VFR BLD AUTO: 40.1 % (ref 34.8–46.1)
HGB BLD-MCNC: 12.7 G/DL (ref 11.5–15.4)
INTERVENTRICULAR SEPTUM IN DIASTOLE (PARASTERNAL SHORT AXIS VIEW): 1.1 CM
LEFT ATRIUM AREA SYSTOLE SINGLE PLANE A4C: 21.9 CM2
LEFT INTERNAL DIMENSION IN SYSTOLE: 2.6 CM (ref 2.1–4)
LEFT VENTRICULAR INTERNAL DIMENSION IN DIASTOLE: 3.7 CM (ref 3.95–5.88)
LEFT VENTRICULAR POSTERIOR WALL IN END DIASTOLE: 1.1 CM
LEFT VENTRICULAR STROKE VOLUME: 33 ML
MAGNESIUM SERPL-MCNC: 2.1 MG/DL (ref 1.6–2.6)
MCH RBC QN AUTO: 28 PG (ref 26.8–34.3)
MCHC RBC AUTO-ENTMCNC: 31.7 G/DL (ref 31.4–37.4)
MCV RBC AUTO: 88 FL (ref 82–98)
MV E'TISSUE VEL-SEP: 8 CM/S
MV PEAK A VEL: 0.72 M/S
MV PEAK E VEL: 55 CM/S
MV STENOSIS PRESSURE HALF TIME: 0 MS
PHOSPHATE SERPL-MCNC: 2.3 MG/DL (ref 2.3–4.1)
PLATELET # BLD AUTO: 243 THOUSANDS/UL (ref 149–390)
PMV BLD AUTO: 10.6 FL (ref 8.9–12.7)
POTASSIUM SERPL-SCNC: 5.3 MMOL/L (ref 3.5–5.3)
RA PRESSURE ESTIMATED: 10 MMHG
RBC # BLD AUTO: 4.54 MILLION/UL (ref 3.81–5.12)
RIGHT ATRIUM AREA SYSTOLE A4C: 15 CM2
RIGHT VENTRICLE ID DIMENSION: 3.8 CM
RV PSP: 44 MMHG
SL CV LV EF: 50
SL CV PED ECHO LEFT VENTRICLE DIASTOLIC VOLUME (MOD BIPLANE) 2D: 58 ML
SL CV PED ECHO LEFT VENTRICLE SYSTOLIC VOLUME (MOD BIPLANE) 2D: 25 ML
SODIUM SERPL-SCNC: 139 MMOL/L (ref 136–145)
TR MAX PG: 34 MMHG
TRICUSPID VALVE PEAK REGURGITATION VELOCITY: 2.93 M/S
TRICUSPID VALVE S': 0.6 CM/S
TV PEAK GRADIENT: 34 MMHG
WBC # BLD AUTO: 5.09 THOUSAND/UL (ref 4.31–10.16)
Z-SCORE OF LEFT VENTRICULAR DIMENSION IN END SYSTOLE: -2.64

## 2021-12-23 PROCEDURE — 85027 COMPLETE CBC AUTOMATED: CPT | Performed by: INTERNAL MEDICINE

## 2021-12-23 PROCEDURE — 93306 TTE W/DOPPLER COMPLETE: CPT | Performed by: INTERNAL MEDICINE

## 2021-12-23 PROCEDURE — 97163 PT EVAL HIGH COMPLEX 45 MIN: CPT

## 2021-12-23 PROCEDURE — 80048 BASIC METABOLIC PNL TOTAL CA: CPT | Performed by: INTERNAL MEDICINE

## 2021-12-23 PROCEDURE — 93306 TTE W/DOPPLER COMPLETE: CPT

## 2021-12-23 PROCEDURE — 70450 CT HEAD/BRAIN W/O DYE: CPT

## 2021-12-23 PROCEDURE — 84100 ASSAY OF PHOSPHORUS: CPT | Performed by: INTERNAL MEDICINE

## 2021-12-23 PROCEDURE — G1004 CDSM NDSC: HCPCS

## 2021-12-23 PROCEDURE — 97167 OT EVAL HIGH COMPLEX 60 MIN: CPT

## 2021-12-23 PROCEDURE — 99213 OFFICE O/P EST LOW 20 MIN: CPT | Performed by: PSYCHIATRY & NEUROLOGY

## 2021-12-23 PROCEDURE — 92610 EVALUATE SWALLOWING FUNCTION: CPT

## 2021-12-23 PROCEDURE — 83735 ASSAY OF MAGNESIUM: CPT | Performed by: INTERNAL MEDICINE

## 2021-12-23 PROCEDURE — 99232 SBSQ HOSP IP/OBS MODERATE 35: CPT | Performed by: INTERNAL MEDICINE

## 2021-12-23 RX ORDER — ATORVASTATIN CALCIUM 40 MG/1
40 TABLET, FILM COATED ORAL
Status: DISCONTINUED | OUTPATIENT
Start: 2021-12-23 | End: 2021-12-26 | Stop reason: HOSPADM

## 2021-12-23 RX ADMIN — ASPIRIN 81 MG CHEWABLE TABLET 81 MG: 81 TABLET CHEWABLE at 09:51

## 2021-12-23 RX ADMIN — ENOXAPARIN SODIUM 40 MG: 40 INJECTION SUBCUTANEOUS at 09:51

## 2021-12-23 RX ADMIN — Medication 1000 UNITS: at 09:51

## 2021-12-23 RX ADMIN — ATORVASTATIN CALCIUM 40 MG: 40 TABLET, FILM COATED ORAL at 22:26

## 2021-12-23 RX ADMIN — Medication 1 TABLET: at 09:51

## 2021-12-24 PROBLEM — R29.90 STROKE-LIKE SYMPTOMS: Status: RESOLVED | Noted: 2021-12-22 | Resolved: 2021-12-24

## 2021-12-24 PROBLEM — G93.40 ENCEPHALOPATHY ACUTE: Status: RESOLVED | Noted: 2017-11-24 | Resolved: 2021-12-24

## 2021-12-24 LAB
ANION GAP SERPL CALCULATED.3IONS-SCNC: 4 MMOL/L (ref 4–13)
BILIRUB UR QL STRIP: NEGATIVE
BUN SERPL-MCNC: 11 MG/DL (ref 5–25)
CALCIUM SERPL-MCNC: 11.3 MG/DL (ref 8.3–10.1)
CHLORIDE SERPL-SCNC: 106 MMOL/L (ref 100–108)
CLARITY UR: CLEAR
CO2 SERPL-SCNC: 30 MMOL/L (ref 21–32)
COLOR UR: YELLOW
CREAT SERPL-MCNC: 0.82 MG/DL (ref 0.6–1.3)
ERYTHROCYTE [DISTWIDTH] IN BLOOD BY AUTOMATED COUNT: 14.2 % (ref 11.6–15.1)
GFR SERPL CREATININE-BSD FRML MDRD: 65 ML/MIN/1.73SQ M
GLUCOSE SERPL-MCNC: 79 MG/DL (ref 65–140)
GLUCOSE UR STRIP-MCNC: NEGATIVE MG/DL
HCT VFR BLD AUTO: 43.6 % (ref 34.8–46.1)
HGB BLD-MCNC: 14.1 G/DL (ref 11.5–15.4)
HGB UR QL STRIP.AUTO: NEGATIVE
KETONES UR STRIP-MCNC: NEGATIVE MG/DL
LEUKOCYTE ESTERASE UR QL STRIP: NEGATIVE
MCH RBC QN AUTO: 27.9 PG (ref 26.8–34.3)
MCHC RBC AUTO-ENTMCNC: 32.3 G/DL (ref 31.4–37.4)
MCV RBC AUTO: 86 FL (ref 82–98)
NITRITE UR QL STRIP: NEGATIVE
PH UR STRIP.AUTO: 7 [PH]
PLATELET # BLD AUTO: 240 THOUSANDS/UL (ref 149–390)
PMV BLD AUTO: 11.4 FL (ref 8.9–12.7)
POTASSIUM SERPL-SCNC: 3.6 MMOL/L (ref 3.5–5.3)
PROT UR STRIP-MCNC: NEGATIVE MG/DL
RBC # BLD AUTO: 5.05 MILLION/UL (ref 3.81–5.12)
SODIUM SERPL-SCNC: 140 MMOL/L (ref 136–145)
SP GR UR STRIP.AUTO: 1.01 (ref 1–1.03)
UROBILINOGEN UR QL STRIP.AUTO: 1 E.U./DL
WBC # BLD AUTO: 5.51 THOUSAND/UL (ref 4.31–10.16)

## 2021-12-24 PROCEDURE — 81003 URINALYSIS AUTO W/O SCOPE: CPT | Performed by: INTERNAL MEDICINE

## 2021-12-24 PROCEDURE — 80048 BASIC METABOLIC PNL TOTAL CA: CPT | Performed by: STUDENT IN AN ORGANIZED HEALTH CARE EDUCATION/TRAINING PROGRAM

## 2021-12-24 PROCEDURE — 85027 COMPLETE CBC AUTOMATED: CPT | Performed by: STUDENT IN AN ORGANIZED HEALTH CARE EDUCATION/TRAINING PROGRAM

## 2021-12-24 PROCEDURE — 99232 SBSQ HOSP IP/OBS MODERATE 35: CPT | Performed by: INTERNAL MEDICINE

## 2021-12-24 RX ORDER — SODIUM CHLORIDE 9 MG/ML
50 INJECTION, SOLUTION INTRAVENOUS CONTINUOUS
Status: DISCONTINUED | OUTPATIENT
Start: 2021-12-24 | End: 2021-12-25

## 2021-12-24 RX ADMIN — ENOXAPARIN SODIUM 40 MG: 40 INJECTION SUBCUTANEOUS at 09:30

## 2021-12-24 RX ADMIN — SODIUM CHLORIDE 50 ML/HR: 0.9 INJECTION, SOLUTION INTRAVENOUS at 12:08

## 2021-12-24 RX ADMIN — Medication 1 TABLET: at 09:30

## 2021-12-24 RX ADMIN — ASPIRIN 81 MG CHEWABLE TABLET 81 MG: 81 TABLET CHEWABLE at 09:30

## 2021-12-24 RX ADMIN — DOCUSATE SODIUM 100 MG: 100 CAPSULE ORAL at 09:30

## 2021-12-24 RX ADMIN — ATORVASTATIN CALCIUM 40 MG: 40 TABLET, FILM COATED ORAL at 22:36

## 2021-12-24 RX ADMIN — Medication 1000 UNITS: at 09:30

## 2021-12-25 ENCOUNTER — APPOINTMENT (INPATIENT)
Dept: RADIOLOGY | Facility: HOSPITAL | Age: 86
DRG: 072 | End: 2021-12-25
Payer: MEDICARE

## 2021-12-25 VITALS
DIASTOLIC BLOOD PRESSURE: 80 MMHG | TEMPERATURE: 97.8 F | WEIGHT: 146 LBS | HEART RATE: 55 BPM | OXYGEN SATURATION: 99 % | SYSTOLIC BLOOD PRESSURE: 161 MMHG | HEIGHT: 64 IN | RESPIRATION RATE: 20 BRPM | BODY MASS INDEX: 24.92 KG/M2

## 2021-12-25 PROBLEM — E83.52 HYPERCALCEMIA: Status: RESOLVED | Noted: 2017-11-24 | Resolved: 2021-12-25

## 2021-12-25 LAB
ANION GAP SERPL CALCULATED.3IONS-SCNC: 4 MMOL/L (ref 4–13)
BUN SERPL-MCNC: 11 MG/DL (ref 5–25)
CALCIUM SERPL-MCNC: 10 MG/DL (ref 8.3–10.1)
CHLORIDE SERPL-SCNC: 108 MMOL/L (ref 100–108)
CO2 SERPL-SCNC: 28 MMOL/L (ref 21–32)
CREAT SERPL-MCNC: 0.72 MG/DL (ref 0.6–1.3)
GFR SERPL CREATININE-BSD FRML MDRD: 76 ML/MIN/1.73SQ M
GLUCOSE SERPL-MCNC: 73 MG/DL (ref 65–140)
POTASSIUM SERPL-SCNC: 3.9 MMOL/L (ref 3.5–5.3)
SODIUM SERPL-SCNC: 140 MMOL/L (ref 136–145)

## 2021-12-25 PROCEDURE — 80048 BASIC METABOLIC PNL TOTAL CA: CPT | Performed by: INTERNAL MEDICINE

## 2021-12-25 PROCEDURE — 72125 CT NECK SPINE W/O DYE: CPT

## 2021-12-25 PROCEDURE — 70450 CT HEAD/BRAIN W/O DYE: CPT

## 2021-12-25 PROCEDURE — 99232 SBSQ HOSP IP/OBS MODERATE 35: CPT | Performed by: INTERNAL MEDICINE

## 2021-12-25 RX ORDER — ASPIRIN 81 MG/1
81 TABLET, CHEWABLE ORAL DAILY
Qty: 30 TABLET | Refills: 1 | Status: SHIPPED | OUTPATIENT
Start: 2021-12-26

## 2021-12-25 RX ORDER — ATORVASTATIN CALCIUM 40 MG/1
20 TABLET, FILM COATED ORAL
Qty: 30 TABLET | Refills: 1 | Status: SHIPPED | OUTPATIENT
Start: 2021-12-25 | End: 2021-12-26 | Stop reason: SDUPTHER

## 2021-12-25 RX ADMIN — Medication 1 TABLET: at 09:37

## 2021-12-25 RX ADMIN — ASPIRIN 81 MG CHEWABLE TABLET 81 MG: 81 TABLET CHEWABLE at 09:37

## 2021-12-25 RX ADMIN — ATORVASTATIN CALCIUM 40 MG: 40 TABLET, FILM COATED ORAL at 21:28

## 2021-12-25 RX ADMIN — ENOXAPARIN SODIUM 40 MG: 40 INJECTION SUBCUTANEOUS at 09:37

## 2021-12-25 RX ADMIN — DOCUSATE SODIUM 100 MG: 100 CAPSULE ORAL at 09:37

## 2021-12-26 PROCEDURE — 99239 HOSP IP/OBS DSCHRG MGMT >30: CPT | Performed by: INTERNAL MEDICINE

## 2021-12-26 RX ORDER — ATORVASTATIN CALCIUM 40 MG/1
40 TABLET, FILM COATED ORAL
Qty: 30 TABLET | Refills: 0 | Status: SHIPPED | OUTPATIENT
Start: 2021-12-26

## 2021-12-26 RX ADMIN — Medication 1 TABLET: at 08:09

## 2021-12-26 RX ADMIN — ASPIRIN 81 MG CHEWABLE TABLET 81 MG: 81 TABLET CHEWABLE at 08:09

## 2021-12-26 RX ADMIN — ENOXAPARIN SODIUM 40 MG: 40 INJECTION SUBCUTANEOUS at 08:09

## 2022-01-03 ENCOUNTER — TELEPHONE (OUTPATIENT)
Dept: NEUROLOGY | Facility: CLINIC | Age: 87
End: 2022-01-03

## 2022-01-03 NOTE — TELEPHONE ENCOUNTER
----- Message from Jorje Potter PA-C sent at 1/3/2022  2:39 PM EST -----  Regarding: RE: HFU  Patient can be seen by general AP or attending neurologist in 6-8 weeks  ----- Message -----  From: Brent Valle MA  Sent: 1/3/2022   1:12 PM EST  To: Jorje Potter PA-C  Subject: Carolynn Mustafa,     I have to sche this Pt for HFU  Please advise if Pt should be sche with Atte or AP, specialty and when  HFU/SLB/Stroke-like symptoms/DC12/26     Note from Chart:      Will further discuss plan of care with attending neurologist       No need for outpatient neurologic follow up is anticipated (unless MRI brain is positive for acute findings )      Thank you

## 2022-02-09 ENCOUNTER — TELEPHONE (OUTPATIENT)
Dept: NEUROLOGY | Facility: CLINIC | Age: 87
End: 2022-02-09

## 2024-01-01 NOTE — ASSESSMENT & PLAN NOTE
Progressing/  Patient returning to her baseline it was most likely related with acute and on chronic dementia  Stable  Continue to Hold Risperdal   Prevent aspiration -Keep blanket away from the baby's face.